# Patient Record
Sex: FEMALE | Race: WHITE | NOT HISPANIC OR LATINO | Employment: OTHER | URBAN - METROPOLITAN AREA
[De-identification: names, ages, dates, MRNs, and addresses within clinical notes are randomized per-mention and may not be internally consistent; named-entity substitution may affect disease eponyms.]

---

## 2022-01-27 ENCOUNTER — OFFICE VISIT (OUTPATIENT)
Dept: INTERNAL MEDICINE | Facility: CLINIC | Age: 77
End: 2022-01-27

## 2022-01-27 VITALS
SYSTOLIC BLOOD PRESSURE: 122 MMHG | RESPIRATION RATE: 20 BRPM | BODY MASS INDEX: 25.43 KG/M2 | HEART RATE: 60 BPM | TEMPERATURE: 97.1 F | HEIGHT: 62 IN | DIASTOLIC BLOOD PRESSURE: 74 MMHG | WEIGHT: 138.2 LBS | OXYGEN SATURATION: 97 %

## 2022-01-27 DIAGNOSIS — J44.9 CHRONIC OBSTRUCTIVE PULMONARY DISEASE, UNSPECIFIED COPD TYPE: ICD-10-CM

## 2022-01-27 DIAGNOSIS — N28.1 KIDNEY CYSTS: ICD-10-CM

## 2022-01-27 DIAGNOSIS — E78.5 HYPERLIPIDEMIA, UNSPECIFIED HYPERLIPIDEMIA TYPE: ICD-10-CM

## 2022-01-27 DIAGNOSIS — R31.9 HEMATURIA, UNSPECIFIED TYPE: Primary | ICD-10-CM

## 2022-01-27 DIAGNOSIS — M19.90 ARTHRITIS: ICD-10-CM

## 2022-01-27 DIAGNOSIS — M81.0 OSTEOPOROSIS WITHOUT CURRENT PATHOLOGICAL FRACTURE, UNSPECIFIED OSTEOPOROSIS TYPE: ICD-10-CM

## 2022-01-27 DIAGNOSIS — Z12.31 ENCOUNTER FOR SCREENING MAMMOGRAM FOR BREAST CANCER: ICD-10-CM

## 2022-01-27 PROCEDURE — 99204 OFFICE O/P NEW MOD 45 MIN: CPT | Performed by: PHYSICIAN ASSISTANT

## 2022-01-27 RX ORDER — ALENDRONATE SODIUM 70 MG/1
TABLET ORAL
COMMUNITY
End: 2022-01-27 | Stop reason: SDUPTHER

## 2022-01-27 RX ORDER — ALENDRONATE SODIUM 70 MG/1
70 TABLET ORAL
Qty: 12 TABLET | Refills: 1 | Status: SHIPPED | OUTPATIENT
Start: 2022-01-27 | End: 2022-07-27 | Stop reason: SDUPTHER

## 2022-01-27 RX ORDER — CELECOXIB 200 MG/1
200 CAPSULE ORAL DAILY
Qty: 90 CAPSULE | Refills: 1 | Status: SHIPPED | OUTPATIENT
Start: 2022-01-27 | End: 2022-07-27 | Stop reason: SDUPTHER

## 2022-01-27 RX ORDER — CELECOXIB 200 MG/1
CAPSULE ORAL
COMMUNITY
End: 2022-01-27 | Stop reason: SDUPTHER

## 2022-01-27 RX ORDER — ALBUTEROL SULFATE 90 UG/1
2 AEROSOL, METERED RESPIRATORY (INHALATION) EVERY 4 HOURS PRN
Qty: 18 G | Refills: 1 | Status: SHIPPED | OUTPATIENT
Start: 2022-01-27 | End: 2022-07-27 | Stop reason: SDUPTHER

## 2022-01-27 RX ORDER — SIMVASTATIN 40 MG
40 TABLET ORAL NIGHTLY
Qty: 90 TABLET | Refills: 1 | Status: SHIPPED | OUTPATIENT
Start: 2022-01-27 | End: 2022-07-27 | Stop reason: SDUPTHER

## 2022-01-27 RX ORDER — SIMVASTATIN 40 MG
TABLET ORAL
COMMUNITY
End: 2022-01-27 | Stop reason: SDUPTHER

## 2022-01-27 NOTE — PROGRESS NOTES
Chief Complaint  Blood in Urine and Med Refill (pt needs 90 day supply. Pt given sample of inhaler )    Subjective          History of Present Illness  Jackie Cordova presents to [facility] for   HLD:  Well controlled on Simvastatin 40mg, had normal cholesterol on labs in 2021.    Kidney Cyst/Hematuria:  Had microscopic blood in urine twice in the last few months at previous Dr office, so they ordered CT scan. CT scan showed kidney cyst 1cm. She does notice any blood.    Osteoporosis:  Last dexa scan a few years ago. Takes Fosamax weekly.     Mammogram:  Last mammogram was last year. No fhx of breast CA. No breast pain, lumps, nipple discharge. Is due for mammogram.     COPD:  On Breo, takes infrequently due to cost. She does not have albuterol inhaler. Does not have wheeze or SOA very often, just when she gets sick with bronchitis.           Review of Systems   Constitutional: Negative for fever and unexpected weight loss.   Respiratory: Negative for cough, shortness of breath and wheezing.    Cardiovascular: Negative for chest pain and palpitations.       The following portions of the patient's history were reviewed and updated as appropriate: allergies, current medications, past family history, past medical history, past social history, past surgical history and problem list.  No Known Allergies  Current Outpatient Medications on File Prior to Visit   Medication Sig Dispense Refill   • diphenhydrAMINE-APAP, sleep, (TYLENOL PM EXTRA STRENGTH PO) Take  by mouth.       No current facility-administered medications on file prior to visit.       Social History     Tobacco Use   Smoking Status Former Smoker   • Packs/day: 0.50   • Types: Cigarettes   • Quit date:    • Years since quittin.0   Smokeless Tobacco Never Used        Objective   Vital Signs:   Vitals:    22 1436   BP: 122/74   Pulse: 60   Resp: 20   Temp: 97.1 °F (36.2 °C)   TempSrc: Temporal   SpO2: 97%   Weight:  "62.7 kg (138 lb 3.2 oz)   Height: 158.1 cm (62.25\")   PainSc:   8   PainLoc: Knee      Physical Exam  Vitals reviewed.   Constitutional:       General: She is not in acute distress.     Appearance: Normal appearance.   HENT:      Head: Normocephalic and atraumatic.   Eyes:      General: No scleral icterus.     Extraocular Movements: Extraocular movements intact.      Conjunctiva/sclera: Conjunctivae normal.   Cardiovascular:      Rate and Rhythm: Normal rate and regular rhythm.      Heart sounds: Normal heart sounds. No murmur heard.      Pulmonary:      Effort: Pulmonary effort is normal. No respiratory distress.      Breath sounds: Normal breath sounds. No stridor. No wheezing or rhonchi.   Musculoskeletal:      Cervical back: Normal range of motion and neck supple.   Skin:     General: Skin is warm and dry.      Coloration: Skin is not jaundiced.   Neurological:      General: No focal deficit present.      Mental Status: She is alert and oriented to person, place, and time.      Gait: Gait normal.   Psychiatric:         Mood and Affect: Mood normal.         Behavior: Behavior normal.        No LMP recorded.    Result Review :                New Medications Ordered This Visit   Medications   • simvastatin (ZOCOR) 40 MG tablet     Sig: Take 1 tablet by mouth Every Night.     Dispense:  90 tablet     Refill:  1   • celecoxib (CeleBREX) 200 MG capsule     Sig: Take 1 capsule by mouth Daily.     Dispense:  90 capsule     Refill:  1   • alendronate (FOSAMAX) 70 MG tablet     Sig: Take 1 tablet by mouth Every 7 (Seven) Days.     Dispense:  12 tablet     Refill:  1   • Fluticasone Furoate-Vilanterol (Breo Ellipta) 100-25 MCG/INH inhaler     Sig: Inhale 1 puff Daily.     Dispense:  180 each     Refill:  1     Dispense 90 d supply   • albuterol sulfate  (90 Base) MCG/ACT inhaler     Sig: Inhale 2 puffs Every 4 (Four) Hours As Needed for Wheezing.     Dispense:  18 g     Refill:  1          Assessment and Plan  "   Diagnoses and all orders for this visit:    1. Hematuria, unspecified type (Primary)  Assessment & Plan:  Refer to urology, pt unable to void today, will return a sample    Orders:  -     Ambulatory Referral to Urology  -     Cancel: POC Urinalysis Dipstick, Automated  -     Urinalysis With Microscopic - Urine, Clean Catch; Future    2. Kidney cysts  Assessment & Plan:  Refer to urology    Orders:  -     Ambulatory Referral to Urology    3. Chronic obstructive pulmonary disease, unspecified COPD type (HCC)  Assessment & Plan:  COPD is improving with treatment.  Continue current medications.  Cont Breo and will refill albuterol      Orders:  -     Fluticasone Furoate-Vilanterol (Breo Ellipta) 100-25 MCG/INH inhaler; Inhale 1 puff Daily.  Dispense: 180 each; Refill: 1  -     albuterol sulfate  (90 Base) MCG/ACT inhaler; Inhale 2 puffs Every 4 (Four) Hours As Needed for Wheezing.  Dispense: 18 g; Refill: 1    4. Osteoporosis without current pathological fracture, unspecified osteoporosis type  Assessment & Plan:  Cont Fosamax    Orders:  -     alendronate (FOSAMAX) 70 MG tablet; Take 1 tablet by mouth Every 7 (Seven) Days.  Dispense: 12 tablet; Refill: 1    5. Hyperlipidemia, unspecified hyperlipidemia type  Assessment & Plan:  Lipid abnormalities are improving with treatment.  Nutritional counseling was provided. and Pharmacotherapy as ordered.  Lipids will be reassessed in 6 months.    Orders:  -     simvastatin (ZOCOR) 40 MG tablet; Take 1 tablet by mouth Every Night.  Dispense: 90 tablet; Refill: 1    6. Arthritis  Assessment & Plan:  Cont Celebrex.     Orders:  -     celecoxib (CeleBREX) 200 MG capsule; Take 1 capsule by mouth Daily.  Dispense: 90 capsule; Refill: 1    7. Encounter for screening mammogram for breast cancer  -     Mammo Screening Digital Tomosynthesis Bilateral With CAD; Future      Follow Up   Return in about 6 months (around 7/27/2022).    Follow up if symptoms worsen or persist or has  new or concerning symptoms, go to ER for severe symptoms.   Reviewed common medication effects and side effects and advised to report side effects immediately.  Encouraged medication compliance and the importance of keeping scheduled follow up appointments with me and any other providers.  If a referral was made please contact our office if you have not heard about an appointment in the next 2 weeks.   If labs or images are ordered we will contact you with the results within the next week.  If you have not heard from us after a week please call our office to inquire about the results.   Patient was given instructions and counseling regarding her condition or for health maintenance advice. Please see specific information pulled into the AVS if appropriate.     Whitley Treadwell PA-C    * Please note that portions of this note were completed with a voice recognition program.

## 2022-01-28 NOTE — ASSESSMENT & PLAN NOTE
COPD is improving with treatment.  Continue current medications.  Cont Breo and will refill albuterol

## 2022-02-21 ENCOUNTER — LAB (OUTPATIENT)
Dept: LAB | Facility: HOSPITAL | Age: 77
End: 2022-02-21

## 2022-02-21 DIAGNOSIS — R31.9 HEMATURIA, UNSPECIFIED TYPE: ICD-10-CM

## 2022-02-21 LAB
BILIRUB UR QL STRIP: NEGATIVE
CLARITY UR: CLEAR
COLOR UR: YELLOW
GLUCOSE UR STRIP-MCNC: NEGATIVE MG/DL
HGB UR QL STRIP.AUTO: NEGATIVE
KETONES UR QL STRIP: NEGATIVE
LEUKOCYTE ESTERASE UR QL STRIP.AUTO: NEGATIVE
NITRITE UR QL STRIP: NEGATIVE
PH UR STRIP.AUTO: 5.5 [PH] (ref 5–8)
PROT UR QL STRIP: NEGATIVE
SP GR UR STRIP: 1.01 (ref 1–1.03)
UROBILINOGEN UR QL STRIP: NORMAL

## 2022-02-21 PROCEDURE — 81001 URINALYSIS AUTO W/SCOPE: CPT | Performed by: PHYSICIAN ASSISTANT

## 2022-02-22 ENCOUNTER — TELEPHONE (OUTPATIENT)
Dept: INTERNAL MEDICINE | Facility: CLINIC | Age: 77
End: 2022-02-22

## 2022-02-22 LAB
BACTERIA UR QL AUTO: NORMAL /HPF
HYALINE CASTS UR QL AUTO: NORMAL /LPF
RBC # UR STRIP: NORMAL /HPF
REF LAB TEST METHOD: NORMAL
SQUAMOUS #/AREA URNS HPF: NORMAL /HPF
WBC # UR STRIP: NORMAL /HPF

## 2022-02-22 NOTE — TELEPHONE ENCOUNTER
----- Message from Whitley Treadwell PA-C sent at 2/22/2022 10:25 AM EST -----  Urinalysis was normal, no blood noted.

## 2022-02-23 NOTE — TELEPHONE ENCOUNTER
Attempted to reach pt. LVM for pt to return call with office.     HUB PLEASE READ THE FOLLOWING:     Urinalysis was normal, no blood noted.    Thank you.     Ann

## 2022-02-25 NOTE — TELEPHONE ENCOUNTER
Lvm for patient to return call regarding results, office number given.     HUB TO READ: IF PATIENT RETURNS CALL PLEASE INFORM HER OF PROVIDERS MESSAGE:   Urinalysis was normal, no blood noted.

## 2022-02-28 ENCOUNTER — OFFICE VISIT (OUTPATIENT)
Dept: UROLOGY | Facility: CLINIC | Age: 77
End: 2022-02-28

## 2022-02-28 VITALS
HEART RATE: 71 BPM | OXYGEN SATURATION: 97 % | SYSTOLIC BLOOD PRESSURE: 134 MMHG | HEIGHT: 62 IN | BODY MASS INDEX: 26.2 KG/M2 | WEIGHT: 142.4 LBS | DIASTOLIC BLOOD PRESSURE: 78 MMHG

## 2022-02-28 DIAGNOSIS — R31.9 HEMATURIA, UNSPECIFIED TYPE: ICD-10-CM

## 2022-02-28 DIAGNOSIS — N28.1 KIDNEY CYSTS: ICD-10-CM

## 2022-02-28 LAB
BILIRUB BLD-MCNC: NEGATIVE MG/DL
CLARITY, POC: CLEAR
COLOR UR: YELLOW
EXPIRATION DATE: ABNORMAL
GLUCOSE UR STRIP-MCNC: NEGATIVE MG/DL
KETONES UR QL: NEGATIVE
LEUKOCYTE EST, POC: NEGATIVE
Lab: ABNORMAL
NITRITE UR-MCNC: NEGATIVE MG/ML
PH UR: 5.5 [PH] (ref 5–8)
PROT UR STRIP-MCNC: NEGATIVE MG/DL
RBC # UR STRIP: ABNORMAL /UL
SP GR UR: 1.02 (ref 1–1.03)
UROBILINOGEN UR QL: NORMAL

## 2022-02-28 PROCEDURE — 99204 OFFICE O/P NEW MOD 45 MIN: CPT | Performed by: STUDENT IN AN ORGANIZED HEALTH CARE EDUCATION/TRAINING PROGRAM

## 2022-02-28 PROCEDURE — 81003 URINALYSIS AUTO W/O SCOPE: CPT | Performed by: STUDENT IN AN ORGANIZED HEALTH CARE EDUCATION/TRAINING PROGRAM

## 2022-02-28 PROCEDURE — 51798 US URINE CAPACITY MEASURE: CPT | Performed by: STUDENT IN AN ORGANIZED HEALTH CARE EDUCATION/TRAINING PROGRAM

## 2022-02-28 NOTE — PROGRESS NOTES
Microscopic Hematuria Female Office Visit      Patient Name: Jackie Cordova  : 1945   MRN: [id number]     Chief Complaint:  Microscopic Hematuria.     Chief Complaint   Patient presents with   • New Patient   • Blood in Urine   .     Referring Provider: Whitley Treadwell PA-C    History of Present Illness: Ms. Cordova is a 76 y.o. female with history of microscopic hematuria. She presents today for evaluation.  Patient reports she is from [place] originally, moved here number of years ago, in the early  she reported she followed with urologist for some procedure for either urethral or ureteral dilation, she is not sure.  Unclear why she was following with the urologist and she does not remember.  She denies any gross hematuria.  Previous records from [facility] in 2021 demonstrates positive microscopic hematuria on urinalysis and creatinine 0.6 with a corresponding GFR of 90+.  She has no other urinary complaints today.  She underwent a CT urogram for evaluation, she brought her disc in today which will be evaluated, the report demonstrates 1 cm left renal cyst and no other abnormalities, normal collecting systems and no concern for other lesions.    Smoking History: 1/2 pack per day x 40-50 years    Occupational exposures: Retired from     Family History of  malignancy: None    Family History of breast, colon, ovarian malignancy: None      Subjective      Review of System: Review of Systems   Constitutional: Negative for chills, fatigue, fever and unexpected weight change.   HENT: Negative for sore throat.    Eyes: Negative for visual disturbance.   Respiratory: Negative for cough, chest tightness and shortness of breath.    Cardiovascular: Negative for chest pain and leg swelling.   Gastrointestinal: Negative for blood in stool, constipation, diarrhea, nausea, rectal pain and vomiting.   Genitourinary: Positive for flank pain, frequency and urgency. Negative for  decreased urine volume, difficulty urinating, dysuria, enuresis, genital sores and hematuria.   Musculoskeletal: Negative for back pain and joint swelling.   Skin: Negative for rash and wound.   Neurological: Negative for seizures, speech difficulty, weakness and headaches.   Psychiatric/Behavioral: Negative for confusion, sleep disturbance and suicidal ideas. The patient is not nervous/anxious.       I have reviewed the ROS documented by my clinical staff, I have updated appropriately and I agree. Christian Hudson MD    Past Medical History:  Past Medical History:   Diagnosis Date   • Arthritis    • Hyperlipidemia        Past Surgical History:  Past Surgical History:   Procedure Laterality Date   • HIP SURGERY     • REPLACEMENT TOTAL KNEE         Medications:    Current Outpatient Medications:   •  albuterol sulfate  (90 Base) MCG/ACT inhaler, Inhale 2 puffs Every 4 (Four) Hours As Needed for Wheezing., Disp: 18 g, Rfl: 1  •  alendronate (FOSAMAX) 70 MG tablet, Take 1 tablet by mouth Every 7 (Seven) Days., Disp: 12 tablet, Rfl: 1  •  celecoxib (CeleBREX) 200 MG capsule, Take 1 capsule by mouth Daily., Disp: 90 capsule, Rfl: 1  •  diphenhydrAMINE-APAP, sleep, (TYLENOL PM EXTRA STRENGTH PO), Take  by mouth., Disp: , Rfl:   •  Fluticasone Furoate-Vilanterol (Breo Ellipta) 100-25 MCG/INH inhaler, Inhale 1 puff Daily., Disp: 180 each, Rfl: 1  •  simvastatin (ZOCOR) 40 MG tablet, Take 1 tablet by mouth Every Night., Disp: 90 tablet, Rfl: 1    Allergies:  No Known Allergies    Social History:  Social History     Socioeconomic History   • Marital status:    Tobacco Use   • Smoking status: Former Smoker     Packs/day: 0.50     Types: Cigarettes     Quit date:      Years since quittin.1   • Smokeless tobacco: Never Used   Vaping Use   • Vaping Use: Never used   Substance and Sexual Activity   • Alcohol use: Never   • Drug use: Never   • Sexual activity: Not Currently       Family History:  Family  "History   Problem Relation Age of Onset   • Diabetes Brother    • Arthritis Brother    • Arthritis Mother    • Arthritis Sister             Post void residual bladder scan:   30 mL    Objective     Physical Exam:   Vital Signs:   Vitals:    02/28/22 1335   BP: 134/78   Pulse: 71   SpO2: 97%   Weight: 64.6 kg (142 lb 6.4 oz)   Height: 158.1 cm (62.25\")     Body mass index is 25.84 kg/m².     Physical Exam  Vitals and nursing note reviewed. Exam conducted with a chaperone present.   Constitutional:       Appearance: Normal appearance.   HENT:      Head: Normocephalic and atraumatic.      Mouth/Throat:      Mouth: Mucous membranes are moist.      Pharynx: Oropharynx is clear.   Eyes:      Extraocular Movements: Extraocular movements intact.      Conjunctiva/sclera: Conjunctivae normal.   Cardiovascular:      Rate and Rhythm: Normal rate and regular rhythm.   Pulmonary:      Effort: Pulmonary effort is normal. No respiratory distress.   Abdominal:      Palpations: Abdomen is soft.      Tenderness: There is no abdominal tenderness. There is no right CVA tenderness or left CVA tenderness.   Genitourinary:     Comments:    Musculoskeletal:         General: Normal range of motion.      Cervical back: Normal range of motion.   Skin:     General: Skin is warm and dry.   Neurological:      General: No focal deficit present.      Mental Status: She is alert and oriented to person, place, and time.   Psychiatric:         Mood and Affect: Mood normal.         Behavior: Behavior normal.         Labs:   Brief Urine Lab Results  (Last result in the past 365 days)      Color   Clarity   Blood   Leuk Est   Nitrite   Protein   CREAT   Urine HCG        02/28/22 1401 Yellow   Clear   1+   Negative   Negative   Negative                      No results found for: GLUCOSE, CALCIUM, NA, K, CO2, CL, BUN, CREATININE, EGFRIFAFRI, EGFRIFNONA, BCR, ANIONGAP    No results found for: WBC, HGB, HCT, MCV, PLT    Images:   No Images in the past 120 " days found..    Measures:   Tobacco:   Jackie Cordova  reports that she quit smoking about 13 months ago. Her smoking use included cigarettes. She smoked 0.50 packs per day. She has never used smokeless tobacco.             Urine Incontinence: ( NOUI)  Patient reports that she is not currently experiencing any symptoms of urinary incontinence.      Assessment / Plan      Assessment/Plan:   Ms. Jackie Cordova is a 76 y.o. female who presents today for asymptomatic microscopic hematuria       The patient was counseled regarding the possible etiologies, relevant work-up and diagnostic approach for microscopic hematuria, as well as the relevant risk categories as assigned by the American Urological Association guidelines.  I discussed that the definition of microscopic hematuria includes a microscopic urinalysis (not dipstick UA) positive for greater than 3 RBCs per high-powered field under microscopy.  We also discussed that any history of gross hematuria places a patient at higher risk for occult malignancies and necessitates prompt workup.  We discussed the aforementioned risk categories as assigned by the AUA, which are depicted below.  Ultimately, work-up and diagnosis of microscopic hematuria is driven by risk category.  Given the patient's age, long term smoking history, and microhematuria; the patient is deemed HIGH risk, and for these reasons I recommend proceeding with a flexible diagnostic cystoscopy.    She has completed a CT urogram which is negative for concerns.    If the patient's microscopic hematuria work-up is negative, per AUA guidelines I would recommend a repeat urinalysis via the patient's primary care provider in 12 months.  If the repeat urinalysis is positive, the patient and I will then need to have a shared decision-making conversation regarding further work-up versus observation, given the low likelihood of identifying etiology in this situation.  If patient were to develop gross hematuria in the  interim, the patient would need a total re-evaluation.                 Diagnoses and all orders for this visit:    1.  Microscopic hematuria  -Proceed with flexible cystoscopy to complete her evaluation given her smoking history, CT urogram is negative    -     POC Urinalysis Dipstick, Automated    2. Kidney cysts  -Benign simple cyst, nothing to do  -     POC Urinalysis Dipstick, Automated         Follow Up:   Return in about 22 days (around 3/22/2022) for flex cysto on 3/22/22 .    I spent approximately 20 minutes providing clinical care for this patient; including review of patient's chart and provider documentation, face to face time spent with patient in examination room (obtaining history, performing physical exam, discussing diagnosis and management options), placing orders, and completing patient documentation.     Christian Hudson MD  [facility]

## 2022-03-22 ENCOUNTER — OFFICE VISIT (OUTPATIENT)
Dept: UROLOGY | Facility: CLINIC | Age: 77
End: 2022-03-22

## 2022-03-22 VITALS
HEIGHT: 62 IN | OXYGEN SATURATION: 99 % | SYSTOLIC BLOOD PRESSURE: 126 MMHG | DIASTOLIC BLOOD PRESSURE: 78 MMHG | BODY MASS INDEX: 26.13 KG/M2 | WEIGHT: 142 LBS | HEART RATE: 77 BPM

## 2022-03-22 DIAGNOSIS — R31.29 MICROHEMATURIA: Primary | ICD-10-CM

## 2022-03-22 LAB
BILIRUB BLD-MCNC: NEGATIVE MG/DL
CLARITY, POC: CLEAR
COLOR UR: YELLOW
EXPIRATION DATE: ABNORMAL
GLUCOSE UR STRIP-MCNC: NEGATIVE MG/DL
KETONES UR QL: NEGATIVE
LEUKOCYTE EST, POC: NEGATIVE
Lab: ABNORMAL
NITRITE UR-MCNC: NEGATIVE MG/ML
PH UR: 6 [PH] (ref 5–8)
PROT UR STRIP-MCNC: NEGATIVE MG/DL
RBC # UR STRIP: ABNORMAL /UL
SP GR UR: 1.01 (ref 1–1.03)
UROBILINOGEN UR QL: NORMAL

## 2022-03-22 PROCEDURE — 52000 CYSTOURETHROSCOPY: CPT | Performed by: STUDENT IN AN ORGANIZED HEALTH CARE EDUCATION/TRAINING PROGRAM

## 2022-03-22 PROCEDURE — 81003 URINALYSIS AUTO W/O SCOPE: CPT | Performed by: STUDENT IN AN ORGANIZED HEALTH CARE EDUCATION/TRAINING PROGRAM

## 2022-03-22 NOTE — PROGRESS NOTES
Preprocedure diagnosis  Microscopic hematuria     Postprocedure diagnosis  Microscopic hematuria    Procedure  Flexible Cystourethroscopy    Attending surgeon  Christian Hudson MD    Anesthesia  2% lidocaine jelly intraurethrally    Complications  None    Indications  76 y.o. female undergoing a flexible cystoscopy for the above mentioned indications.      Informed consent was obtained prior to the procedure start.       Findings  Cystoscopy revealed normal bladder mucosa with NO tumors, masses, stones or trabeculations noted.     Procedure  The patient was placed in supine position and prepped and draped in sterile fashion with lidocaine jelly instilled 5 minutes pre-procedure start.  A brief timeout including available nursing staff and awake patient was performed.  The 16 Fr digital flexible cystoscope was lubricated and gently placed into the urethral meatus. The proximal and distal portions of the urethra appeared well vascularized and normal in appearance. The bladder neck was visualized and appeared well vascularized without mucosal lesions or abnormal appearance. The bladder was then entered and  completely visualized including the trigone. There were bilateral orthotopic ureteral orifices which appeared patent and effluxed clear yellow urine. The posterior wall, lateral walls, anterior wall, and dome were visualized. The cystoscope was then retroflexed and the bladder neck was further visualized and appeared normal.  The scope was gently withdrawn and the procedure terminated.  The patient tolerated the procedure well.       Follow Up:     Flexible cystoscopy and CT urogram negative, recommend yearly UA, BMP and reflex micro UA if hematuria present.   - F/u PRN    Christian Hudson MD

## 2022-04-04 ENCOUNTER — APPOINTMENT (OUTPATIENT)
Dept: MAMMOGRAPHY | Facility: HOSPITAL | Age: 77
End: 2022-04-04

## 2022-07-27 ENCOUNTER — OFFICE VISIT (OUTPATIENT)
Dept: INTERNAL MEDICINE | Facility: CLINIC | Age: 77
End: 2022-07-27

## 2022-07-27 ENCOUNTER — LAB (OUTPATIENT)
Dept: LAB | Facility: HOSPITAL | Age: 77
End: 2022-07-27

## 2022-07-27 VITALS
HEIGHT: 60 IN | OXYGEN SATURATION: 99 % | TEMPERATURE: 98 F | RESPIRATION RATE: 16 BRPM | WEIGHT: 148.2 LBS | SYSTOLIC BLOOD PRESSURE: 116 MMHG | BODY MASS INDEX: 29.09 KG/M2 | HEART RATE: 68 BPM | DIASTOLIC BLOOD PRESSURE: 70 MMHG

## 2022-07-27 DIAGNOSIS — Z11.59 NEED FOR HEPATITIS C SCREENING TEST: ICD-10-CM

## 2022-07-27 DIAGNOSIS — Z00.00 MEDICARE ANNUAL WELLNESS VISIT, SUBSEQUENT: ICD-10-CM

## 2022-07-27 DIAGNOSIS — R31.29 MICROSCOPIC HEMATURIA: ICD-10-CM

## 2022-07-27 DIAGNOSIS — Z87.891 FORMER SMOKER: ICD-10-CM

## 2022-07-27 DIAGNOSIS — J44.9 CHRONIC OBSTRUCTIVE PULMONARY DISEASE, UNSPECIFIED COPD TYPE: ICD-10-CM

## 2022-07-27 DIAGNOSIS — M81.0 OSTEOPOROSIS WITHOUT CURRENT PATHOLOGICAL FRACTURE, UNSPECIFIED OSTEOPOROSIS TYPE: ICD-10-CM

## 2022-07-27 DIAGNOSIS — E78.5 HYPERLIPIDEMIA, UNSPECIFIED HYPERLIPIDEMIA TYPE: ICD-10-CM

## 2022-07-27 DIAGNOSIS — Z13.29 SCREENING FOR ENDOCRINE DISORDER: ICD-10-CM

## 2022-07-27 DIAGNOSIS — Z00.00 MEDICARE ANNUAL WELLNESS VISIT, SUBSEQUENT: Primary | ICD-10-CM

## 2022-07-27 DIAGNOSIS — M19.90 ARTHRITIS: ICD-10-CM

## 2022-07-27 DIAGNOSIS — Z23 NEED FOR VACCINATION: ICD-10-CM

## 2022-07-27 PROBLEM — K21.9 GASTROESOPHAGEAL REFLUX DISEASE WITHOUT ESOPHAGITIS: Status: ACTIVE | Noted: 2022-01-04

## 2022-07-27 PROBLEM — E83.52 HYPERCALCEMIA: Status: ACTIVE | Noted: 2022-01-04

## 2022-07-27 LAB
ALBUMIN SERPL-MCNC: 4.5 G/DL (ref 3.5–5.2)
ALBUMIN/GLOB SERPL: 2 G/DL
ALP SERPL-CCNC: 91 U/L (ref 39–117)
ALT SERPL W P-5'-P-CCNC: 26 U/L (ref 1–33)
ANION GAP SERPL CALCULATED.3IONS-SCNC: 11.2 MMOL/L (ref 5–15)
AST SERPL-CCNC: 27 U/L (ref 1–32)
BASOPHILS # BLD AUTO: 0.05 10*3/MM3 (ref 0–0.2)
BASOPHILS NFR BLD AUTO: 0.7 % (ref 0–1.5)
BILIRUB SERPL-MCNC: 0.4 MG/DL (ref 0–1.2)
BUN SERPL-MCNC: 20 MG/DL (ref 8–23)
BUN/CREAT SERPL: 23.8 (ref 7–25)
CALCIUM SPEC-SCNC: 10 MG/DL (ref 8.6–10.5)
CHLORIDE SERPL-SCNC: 103 MMOL/L (ref 98–107)
CHOLEST SERPL-MCNC: 167 MG/DL (ref 0–200)
CO2 SERPL-SCNC: 23.8 MMOL/L (ref 22–29)
CREAT SERPL-MCNC: 0.84 MG/DL (ref 0.57–1)
DEPRECATED RDW RBC AUTO: 43.3 FL (ref 37–54)
EGFRCR SERPLBLD CKD-EPI 2021: 72.1 ML/MIN/1.73
EOSINOPHIL # BLD AUTO: 0.34 10*3/MM3 (ref 0–0.4)
EOSINOPHIL NFR BLD AUTO: 4.8 % (ref 0.3–6.2)
ERYTHROCYTE [DISTWIDTH] IN BLOOD BY AUTOMATED COUNT: 13.1 % (ref 12.3–15.4)
GLOBULIN UR ELPH-MCNC: 2.3 GM/DL
GLUCOSE SERPL-MCNC: 80 MG/DL (ref 65–99)
HCT VFR BLD AUTO: 37.1 % (ref 34–46.6)
HCV AB SER DONR QL: NORMAL
HDLC SERPL-MCNC: 73 MG/DL (ref 40–60)
HGB BLD-MCNC: 12.4 G/DL (ref 12–15.9)
IMM GRANULOCYTES # BLD AUTO: 0.01 10*3/MM3 (ref 0–0.05)
IMM GRANULOCYTES NFR BLD AUTO: 0.1 % (ref 0–0.5)
LDLC SERPL CALC-MCNC: 79 MG/DL (ref 0–100)
LDLC/HDLC SERPL: 1.06 {RATIO}
LYMPHOCYTES # BLD AUTO: 2.25 10*3/MM3 (ref 0.7–3.1)
LYMPHOCYTES NFR BLD AUTO: 32 % (ref 19.6–45.3)
MCH RBC QN AUTO: 30.4 PG (ref 26.6–33)
MCHC RBC AUTO-ENTMCNC: 33.4 G/DL (ref 31.5–35.7)
MCV RBC AUTO: 90.9 FL (ref 79–97)
MONOCYTES # BLD AUTO: 0.62 10*3/MM3 (ref 0.1–0.9)
MONOCYTES NFR BLD AUTO: 8.8 % (ref 5–12)
NEUTROPHILS NFR BLD AUTO: 3.76 10*3/MM3 (ref 1.7–7)
NEUTROPHILS NFR BLD AUTO: 53.6 % (ref 42.7–76)
NRBC BLD AUTO-RTO: 0 /100 WBC (ref 0–0.2)
PLATELET # BLD AUTO: 379 10*3/MM3 (ref 140–450)
PMV BLD AUTO: 10.4 FL (ref 6–12)
POTASSIUM SERPL-SCNC: 4.8 MMOL/L (ref 3.5–5.2)
PROT SERPL-MCNC: 6.8 G/DL (ref 6–8.5)
RBC # BLD AUTO: 4.08 10*6/MM3 (ref 3.77–5.28)
SODIUM SERPL-SCNC: 138 MMOL/L (ref 136–145)
TRIGL SERPL-MCNC: 82 MG/DL (ref 0–150)
TSH SERPL DL<=0.05 MIU/L-ACNC: 1.27 UIU/ML (ref 0.27–4.2)
VLDLC SERPL-MCNC: 15 MG/DL (ref 5–40)
WBC NRBC COR # BLD: 7.03 10*3/MM3 (ref 3.4–10.8)

## 2022-07-27 PROCEDURE — G0009 ADMIN PNEUMOCOCCAL VACCINE: HCPCS | Performed by: PHYSICIAN ASSISTANT

## 2022-07-27 PROCEDURE — G0439 PPPS, SUBSEQ VISIT: HCPCS | Performed by: PHYSICIAN ASSISTANT

## 2022-07-27 PROCEDURE — 99397 PER PM REEVAL EST PAT 65+ YR: CPT | Performed by: PHYSICIAN ASSISTANT

## 2022-07-27 PROCEDURE — 90677 PCV20 VACCINE IM: CPT | Performed by: PHYSICIAN ASSISTANT

## 2022-07-27 PROCEDURE — 1125F AMNT PAIN NOTED PAIN PRSNT: CPT | Performed by: PHYSICIAN ASSISTANT

## 2022-07-27 PROCEDURE — 80061 LIPID PANEL: CPT | Performed by: PHYSICIAN ASSISTANT

## 2022-07-27 PROCEDURE — 84443 ASSAY THYROID STIM HORMONE: CPT | Performed by: PHYSICIAN ASSISTANT

## 2022-07-27 PROCEDURE — 86803 HEPATITIS C AB TEST: CPT | Performed by: PHYSICIAN ASSISTANT

## 2022-07-27 PROCEDURE — 1170F FXNL STATUS ASSESSED: CPT | Performed by: PHYSICIAN ASSISTANT

## 2022-07-27 PROCEDURE — 1160F RVW MEDS BY RX/DR IN RCRD: CPT | Performed by: PHYSICIAN ASSISTANT

## 2022-07-27 PROCEDURE — 85025 COMPLETE CBC W/AUTO DIFF WBC: CPT | Performed by: PHYSICIAN ASSISTANT

## 2022-07-27 PROCEDURE — 80053 COMPREHEN METABOLIC PANEL: CPT | Performed by: PHYSICIAN ASSISTANT

## 2022-07-27 RX ORDER — CELECOXIB 200 MG/1
200 CAPSULE ORAL DAILY
Qty: 90 CAPSULE | Refills: 1 | Status: SHIPPED | OUTPATIENT
Start: 2022-07-27 | End: 2023-02-22

## 2022-07-27 RX ORDER — ALENDRONATE SODIUM 70 MG/1
70 TABLET ORAL
Qty: 12 TABLET | Refills: 1 | Status: ON HOLD | OUTPATIENT
Start: 2022-07-27 | End: 2023-03-21

## 2022-07-27 RX ORDER — SIMVASTATIN 40 MG
40 TABLET ORAL NIGHTLY
Qty: 90 TABLET | Refills: 1 | Status: SHIPPED | OUTPATIENT
Start: 2022-07-27 | End: 2023-02-22

## 2022-07-27 RX ORDER — ALBUTEROL SULFATE 90 UG/1
2 AEROSOL, METERED RESPIRATORY (INHALATION) EVERY 4 HOURS PRN
Qty: 18 G | Refills: 1 | Status: SHIPPED | OUTPATIENT
Start: 2022-07-27 | End: 2023-03-31

## 2022-07-27 NOTE — ASSESSMENT & PLAN NOTE
Lipid abnormalities are improving with treatment.  Nutritional counseling was provided. and Pharmacotherapy as ordered.  Lipids will be reassessed in 6 months. Cont Zocor

## 2022-07-27 NOTE — ASSESSMENT & PLAN NOTE
COPD is improving with treatment.  Continue current medications. samples of breo x 1 mo given, cont breo and albuterol prn

## 2022-07-27 NOTE — PROGRESS NOTES
The ABCs of the Annual Wellness Visit  Subsequent Medicare Wellness Visit    Chief Complaint   Patient presents with   • Medicare Wellness-subsequent      Subjective    History of Present Illness:  Jackie Cordova is a 77 y.o. female who presents for a Subsequent Medicare Wellness Visit.    HLD:  Well controlled on Simvastatin 40mg, had normal cholesterol on labs in Nov 2021. No AE    Kidney Cyst/Hematuria:  Did see urology, had normal w/u for hematuria, repeat BMP and u/a yearly and f/u prn. Last u/a 3/2022    Osteoporosis:  Last dexa scan a few years ago. Takes Fosamax weekly.  Not taking vit D or calcium.     Mammogram:  Last mammogram was last year. No fhx of breast CA. No breast pain, lumps, nipple discharge. Is due for mammogram, has to reschedule.    COPD:  On Breo, takes infrequently due to cost. She does not have albuterol inhaler. Does not have wheeze or SOA very often, just when she gets sick with bronchitis.         The following portions of the patient's history were reviewed and   updated as appropriate: allergies, current medications, past family history, past medical history, past social history, past surgical history and problem list.    Compared to one year ago, the patient feels her physical   health is the same.    Compared to one year ago, the patient feels her mental   health is the same.    Recent Hospitalizations:  She was not admitted to the hospital during the last year.       Current Medical Providers:  Patient Care Team:  Whitley Treadwell PA-C as PCP - General (Physician Assistant)    Outpatient Medications Prior to Visit   Medication Sig Dispense Refill   • diphenhydrAMINE-APAP, sleep, (TYLENOL PM EXTRA STRENGTH PO) Take  by mouth.     • alendronate (FOSAMAX) 70 MG tablet Take 1 tablet by mouth Every 7 (Seven) Days. 12 tablet 1   • celecoxib (CeleBREX) 200 MG capsule Take 1 capsule by mouth Daily. 90 capsule 1   • Fluticasone Furoate-Vilanterol (Breo Ellipta) 100-25 MCG/INH inhaler Inhale  1 puff Daily. 180 each 1   • simvastatin (ZOCOR) 40 MG tablet Take 1 tablet by mouth Every Night. 90 tablet 1   • albuterol sulfate  (90 Base) MCG/ACT inhaler Inhale 2 puffs Every 4 (Four) Hours As Needed for Wheezing. 18 g 1     No facility-administered medications prior to visit.       No opioid medication identified on active medication list. I have reviewed chart for other potential  high risk medication/s and harmful drug interactions in the elderly.          Aspirin is not on active medication list.  Aspirin use is not indicated based on review of current medical condition/s. Risk of harm outweighs potential benefits.  .    Patient Active Problem List   Diagnosis   • Osteoporosis without current pathological fracture   • Chronic obstructive pulmonary disease (HCC)   • Kidney cysts   • Hyperlipidemia   • Arthritis   • Microscopic hematuria   • Hypercalcemia   • Gastroesophageal reflux disease without esophagitis     Advance Care Planning  Advance Directive is on file.  ACP discussion was held with the patient during this visit. Patient has an advance directive in EMR which is still valid.     Review of Systems   Constitutional: Negative for fatigue and fever.   HENT: Negative for congestion and sore throat.    Respiratory: Negative for cough, shortness of breath and wheezing.    Cardiovascular: Negative for chest pain and palpitations.   Gastrointestinal: Negative for abdominal pain, blood in stool, constipation, diarrhea and vomiting.   Endocrine: Negative for polydipsia and polyuria.   Genitourinary: Negative for dysuria, hematuria and menstrual problem.   Musculoskeletal: Positive for arthralgias. Negative for gait problem and joint swelling.   Skin: Negative for rash.   Neurological: Negative for dizziness, syncope and confusion.   Psychiatric/Behavioral: Negative for sleep disturbance. The patient is not nervous/anxious.         Objective    Vitals:    07/27/22 0813   BP: 116/70   Pulse: 68   Resp:  "16   Temp: 98 °F (36.7 °C)   TempSrc: Infrared   SpO2: 99%   Weight: 67.2 kg (148 lb 3.2 oz)   Height: 152.4 cm (60\")     Estimated body mass index is 28.94 kg/m² as calculated from the following:    Height as of this encounter: 152.4 cm (60\").    Weight as of this encounter: 67.2 kg (148 lb 3.2 oz).    BMI is >= 25 and <30. (Overweight) The following options were offered after discussion;: exercise counseling/recommendations and nutrition counseling/recommendations      Does the patient have evidence of cognitive impairment? No    Physical Exam  Vitals reviewed.   Constitutional:       General: She is not in acute distress.     Appearance: Normal appearance.   HENT:      Head: Normocephalic and atraumatic.   Eyes:      General: No scleral icterus.     Extraocular Movements: Extraocular movements intact.      Conjunctiva/sclera: Conjunctivae normal.   Cardiovascular:      Rate and Rhythm: Normal rate and regular rhythm.      Heart sounds: Normal heart sounds. No murmur heard.  Pulmonary:      Effort: Pulmonary effort is normal. No respiratory distress.      Breath sounds: Normal breath sounds. No stridor. No wheezing or rhonchi.   Abdominal:      General: Bowel sounds are normal.      Palpations: Abdomen is soft. There is no mass.      Tenderness: There is no abdominal tenderness.   Musculoskeletal:      Cervical back: Normal range of motion and neck supple.   Skin:     General: Skin is warm and dry.      Coloration: Skin is not jaundiced.   Neurological:      General: No focal deficit present.      Mental Status: She is alert and oriented to person, place, and time.      Gait: Gait normal.   Psychiatric:         Mood and Affect: Mood normal.         Behavior: Behavior normal.                 HEALTH RISK ASSESSMENT    Smoking Status:  Social History     Tobacco Use   Smoking Status Former Smoker   • Packs/day: 0.50   • Years: 40.00   • Pack years: 20.00   • Types: Cigarettes   • Start date: 1981   • Quit date: 2021 "   • Years since quittin.5   Smokeless Tobacco Never Used     Alcohol Consumption:  Social History     Substance and Sexual Activity   Alcohol Use Yes    Comment: occasionally a glass of wine     Fall Risk Screen:    HAKAN Fall Risk Assessment was completed, and patient is at HIGH risk for falls. Assessment completed on:2022    Depression Screening:  PHQ-2/PHQ-9 Depression Screening 2022   Retired PHQ-9 Total Score -   Retired Total Score -   Little Interest or Pleasure in Doing Things 0-->not at all   Feeling Down, Depressed or Hopeless 0-->not at all   PHQ-9: Brief Depression Severity Measure Score 0       Health Habits and Functional and Cognitive Screening:  Functional & Cognitive Status 2022   Do you have difficulty preparing food and eating? No   Do you have difficulty bathing yourself, getting dressed or grooming yourself? No   Do you have difficulty using the toilet? No   Do you have difficulty moving around from place to place? No   Do you have trouble with steps or getting out of a bed or a chair? No   Current Diet Unhealthy Diet   Dental Exam Not up to date   Eye Exam Up to date   Exercise (times per week) 0 times per week   Current Exercises Include No Regular Exercise   Do you need help using the phone?  No   Are you deaf or do you have serious difficulty hearing?  No   Do you need help with transportation? No   Do you need help shopping? No   Do you need help preparing meals?  No   Do you need help with housework?  No   Do you need help with laundry? No   Do you need help taking your medications? No   Do you need help managing money? No   Do you ever drive or ride in a car without wearing a seat belt? No   Have you felt unusual stress, anger or loneliness in the last month? No   Who do you live with? Alone   If you need help, do you have trouble finding someone available to you? No   Have you been bothered in the last four weeks by sexual problems? No   Do you have difficulty  concentrating, remembering or making decisions? No       Age-appropriate Screening Schedule:  Refer to the list below for future screening recommendations based on patient's age, sex and/or medical conditions. Orders for these recommended tests are listed in the plan section. The patient has been provided with a written plan.    Health Maintenance   Topic Date Due   • DXA SCAN  Never done   • TDAP/TD VACCINES (1 - Tdap) Never done   • ZOSTER VACCINE (1 of 2) Never done   • LIPID PANEL  Never done   • INFLUENZA VACCINE  10/01/2022              Assessment & Plan   CMS Preventative Services Quick Reference  Risk Factors Identified During Encounter  Fall Risk-High or Moderate  Obesity/Overweight   The above risks/problems have been discussed with the patient.  Follow up actions/plans if indicated are seen below in the Assessment/Plan Section.  Pertinent information has been shared with the patient in the After Visit Summary.    Diagnoses and all orders for this visit:    1. Medicare annual wellness visit, subsequent (Primary)  -     Comprehensive Metabolic Panel; Future  -     Lipid Panel; Future  -     CBC Auto Differential; Future  -     TSH Rfx On Abnormal To Free T4; Future    2. Chronic obstructive pulmonary disease, unspecified COPD type (HCC)  Assessment & Plan:  COPD is improving with treatment.  Continue current medications. samples of breo x 1 mo given, cont breo and albuterol prn        Orders:  -     albuterol sulfate  (90 Base) MCG/ACT inhaler; Inhale 2 puffs Every 4 (Four) Hours As Needed for Wheezing.  Dispense: 18 g; Refill: 1  -     Comprehensive Metabolic Panel; Future  -     CBC Auto Differential; Future  -     Fluticasone Furoate-Vilanterol (Breo Ellipta) 100-25 MCG/INH inhaler; Inhale 1 puff Daily.  Dispense: 28 each; Refill: 5    3. Hyperlipidemia, unspecified hyperlipidemia type  Assessment & Plan:  Lipid abnormalities are improving with treatment.  Nutritional counseling was provided. and  Pharmacotherapy as ordered.  Lipids will be reassessed in 6 months. Cont Zocor    Orders:  -     simvastatin (ZOCOR) 40 MG tablet; Take 1 tablet by mouth Every Night.  Dispense: 90 tablet; Refill: 1  -     Lipid Panel; Future    4. Osteoporosis without current pathological fracture, unspecified osteoporosis type  Assessment & Plan:  Cont Fosamax, add vit D and calcium    Orders:  -     alendronate (FOSAMAX) 70 MG tablet; Take 1 tablet by mouth Every 7 (Seven) Days.  Dispense: 12 tablet; Refill: 1    5. Arthritis  Assessment & Plan:  Chronic, stable, cont celebrex    Orders:  -     celecoxib (CeleBREX) 200 MG capsule; Take 1 capsule by mouth Daily.  Dispense: 90 capsule; Refill: 1    6. Need for hepatitis C screening test  -     Hepatitis C Antibody; Future    7. Screening for endocrine disorder  -     TSH Rfx On Abnormal To Free T4; Future    8. Former smoker  -      CT Chest Low Dose Cancer Screening WO; Future    9. Need for vaccination  -     Pneumococcal Conjugate Vaccine 20-Valent (PCV20)    10. Microscopic hematuria  Assessment & Plan:  Will monitor with BMP and u/a yearly as dir, f/u with urology prn        Follow Up:   Return in about 6 months (around 1/27/2023) for Recheck.     An After Visit Summary and PPPS were made available to the patient.

## 2022-07-28 ENCOUNTER — TELEPHONE (OUTPATIENT)
Dept: INTERNAL MEDICINE | Facility: CLINIC | Age: 77
End: 2022-07-28

## 2022-07-28 NOTE — TELEPHONE ENCOUNTER
Called and lvm for patient to return call, office number given.     HUB: if patient returns call please relay providers message:   Labs all looked good and are within acceptable ranges.

## 2022-07-28 NOTE — TELEPHONE ENCOUNTER
----- Message from Whitley Treadwell PA-C sent at 7/28/2022  2:17 PM EDT -----  Labs all looked good and are within acceptable ranges

## 2022-07-29 ENCOUNTER — TRANSCRIBE ORDERS (OUTPATIENT)
Dept: ADMINISTRATIVE | Facility: HOSPITAL | Age: 77
End: 2022-07-29

## 2022-07-29 DIAGNOSIS — Z12.31 VISIT FOR SCREENING MAMMOGRAM: Primary | ICD-10-CM

## 2022-08-24 ENCOUNTER — APPOINTMENT (OUTPATIENT)
Dept: MAMMOGRAPHY | Facility: HOSPITAL | Age: 77
End: 2022-08-24

## 2022-08-26 ENCOUNTER — HOSPITAL ENCOUNTER (OUTPATIENT)
Dept: MAMMOGRAPHY | Facility: HOSPITAL | Age: 77
Discharge: HOME | End: 2022-08-26
Admitting: PHYSICIAN ASSISTANT

## 2022-08-26 ENCOUNTER — APPOINTMENT (OUTPATIENT)
Dept: OTHER | Facility: HOSPITAL | Age: 77
End: 2022-08-26

## 2022-08-26 DIAGNOSIS — Z12.31 ENCOUNTER FOR SCREENING MAMMOGRAM FOR BREAST CANCER: ICD-10-CM

## 2022-08-26 PROCEDURE — 77063 BREAST TOMOSYNTHESIS BI: CPT | Performed by: RADIOLOGY

## 2022-08-26 PROCEDURE — 77067 SCR MAMMO BI INCL CAD: CPT | Performed by: RADIOLOGY

## 2022-08-26 PROCEDURE — 77067 SCR MAMMO BI INCL CAD: CPT

## 2022-08-26 PROCEDURE — 77063 BREAST TOMOSYNTHESIS BI: CPT

## 2022-08-31 ENCOUNTER — TELEPHONE (OUTPATIENT)
Dept: INTERNAL MEDICINE | Facility: CLINIC | Age: 77
End: 2022-08-31

## 2022-09-01 NOTE — TELEPHONE ENCOUNTER
Called and lvm for patient to return call, office number given.     HUB: if patient returns call please relay providers message:   ----- Message from Whitley Treadwell PA-C sent at 8/31/2022  9:00 AM EDT -----  Mammogram was incomplete and additional images are recommended. Someone should call you to schedule this, let me know if you don't hear about an appt for repeat mammogram in the next week or two.

## 2022-09-02 NOTE — TELEPHONE ENCOUNTER
2nd attempt to contact patient.     HUB: if patient returns call please relay providers message:   ----- Message from Whitley Treadwell PA-C sent at 8/31/2022  9:00 AM EDT -----  Mammogram was incomplete and additional images are recommended. Someone should call you to schedule this, let me know if you don't hear about an appt for repeat mammogram in the next week or two.

## 2022-09-06 NOTE — TELEPHONE ENCOUNTER
3rd and final attempt to contact patient.     HUB: if patient returns call please relay providers message:   ----- Message from Whitley Treadwell PA-C sent at 8/31/2022  9:00 AM EDT -----  Mammogram was incomplete and additional images are recommended. Someone should call you to schedule this, let me know if you don't hear about an appt for repeat mammogram in the next week or two.

## 2022-11-30 ENCOUNTER — HOSPITAL ENCOUNTER (OUTPATIENT)
Dept: MAMMOGRAPHY | Facility: HOSPITAL | Age: 77
Discharge: HOME | End: 2022-11-30
Admitting: RADIOLOGY

## 2022-11-30 ENCOUNTER — TELEPHONE (OUTPATIENT)
Dept: INTERNAL MEDICINE | Facility: CLINIC | Age: 77
End: 2022-11-30

## 2022-11-30 DIAGNOSIS — R92.8 ABNORMAL MAMMOGRAM: ICD-10-CM

## 2022-11-30 PROCEDURE — 77066 DX MAMMO INCL CAD BI: CPT

## 2022-11-30 PROCEDURE — G0279 TOMOSYNTHESIS, MAMMO: HCPCS | Performed by: RADIOLOGY

## 2022-11-30 PROCEDURE — 77066 DX MAMMO INCL CAD BI: CPT | Performed by: RADIOLOGY

## 2022-11-30 PROCEDURE — G0279 TOMOSYNTHESIS, MAMMO: HCPCS

## 2023-01-30 ENCOUNTER — OFFICE VISIT (OUTPATIENT)
Dept: INTERNAL MEDICINE | Facility: CLINIC | Age: 78
End: 2023-01-30
Payer: MEDICARE

## 2023-01-30 VITALS
SYSTOLIC BLOOD PRESSURE: 118 MMHG | OXYGEN SATURATION: 98 % | RESPIRATION RATE: 20 BRPM | DIASTOLIC BLOOD PRESSURE: 70 MMHG | HEART RATE: 66 BPM | HEIGHT: 60 IN | TEMPERATURE: 97.1 F | BODY MASS INDEX: 31.02 KG/M2 | WEIGHT: 158 LBS

## 2023-01-30 DIAGNOSIS — J44.9 CHRONIC OBSTRUCTIVE PULMONARY DISEASE, UNSPECIFIED COPD TYPE: Primary | ICD-10-CM

## 2023-01-30 DIAGNOSIS — M81.0 OSTEOPOROSIS WITHOUT CURRENT PATHOLOGICAL FRACTURE, UNSPECIFIED OSTEOPOROSIS TYPE: ICD-10-CM

## 2023-01-30 DIAGNOSIS — E78.5 HYPERLIPIDEMIA, UNSPECIFIED HYPERLIPIDEMIA TYPE: ICD-10-CM

## 2023-01-30 DIAGNOSIS — M19.90 ARTHRITIS: ICD-10-CM

## 2023-01-30 DIAGNOSIS — Z87.891 HISTORY OF CIGARETTE SMOKING: ICD-10-CM

## 2023-01-30 PROCEDURE — 99214 OFFICE O/P EST MOD 30 MIN: CPT | Performed by: PHYSICIAN ASSISTANT

## 2023-01-30 NOTE — ASSESSMENT & PLAN NOTE
Chronic, stable, cont Celebrex and prn tylenol. Suggested PT to help with improving ROM and strength of shoulders, knee, pt will think on this

## 2023-01-30 NOTE — PROGRESS NOTES
Chief Complaint  COPD and Hyperlipidemia    Subjective          History of Present Illness  Jackie Cordova presents to [facility] for   History of Present Illness  HLD:  Well controlled on Simvastatin 40mg. No AE  Lab Results       Component                Value               Date                       CHOL                     167                 07/27/2022                 TRIG                     82                  07/27/2022                 HDL                      73 (H)              07/27/2022                 LDL                      79                  07/27/2022              Kidney Cyst/Hematuria:  Did see urology, had benign w/u for hematuria w/cystoscopy, repeat BMP and u/a yearly and f/u prn.    Osteoporosis:  Last dexa scan a few years ago. Takes Fosamax weekly. Has been on it for the last 2 years.     COPD:  On Breo, not using daily. Does not have wheeze or SOA very often, just when she gets sick with bronchitis or if she goes on a long walk. She has been using albuterol a few times a day and the Breo infrequently.     Arthritis:  She will have joint pain with rainy weather. She has pain in elbows, knees, shoulders, occ hand pain but not too bad, sometimes has knuckle swelling. Occ rt knee swelling, had sx on this knee previously. Takes tylenol pm and celebrex for pain.        The following portions of the patient's history were reviewed and updated as appropriate: allergies, current medications, past family history, past medical history, past social history, past surgical history and problem list.  No Known Allergies    Current Outpatient Medications:   •  albuterol sulfate  (90 Base) MCG/ACT inhaler, Inhale 2 puffs Every 4 (Four) Hours As Needed for Wheezing., Disp: 18 g, Rfl: 1  •  alendronate (FOSAMAX) 70 MG tablet, Take 1 tablet by mouth Every 7 (Seven) Days., Disp: 12 tablet, Rfl: 1  •  celecoxib (CeleBREX) 200 MG capsule, Take 1 capsule by mouth Daily., Disp:  "90 capsule, Rfl: 1  •  diphenhydrAMINE-APAP, sleep, (TYLENOL PM EXTRA STRENGTH PO), Take  by mouth., Disp: , Rfl:   •  Fluticasone Furoate-Vilanterol (Breo Ellipta) 100-25 MCG/INH inhaler, Inhale 1 puff Daily., Disp: 28 each, Rfl: 5  •  simvastatin (ZOCOR) 40 MG tablet, Take 1 tablet by mouth Every Night., Disp: 90 tablet, Rfl: 1  No orders of the defined types were placed in this encounter.    Social History     Tobacco Use   Smoking Status Former   • Packs/day: 0.50   • Years: 40.00   • Pack years: 20.00   • Types: Cigarettes   • Start date:    • Quit date:    • Years since quittin.0   Smokeless Tobacco Never        Objective   Vital Signs:   Vitals:    23 1053   BP: 118/70   Pulse: 66   Resp: 20   Temp: 97.1 °F (36.2 °C)   TempSrc: Temporal   SpO2: 98%   Weight: 71.7 kg (158 lb)   Height: 152.4 cm (60\")   PainSc: 0-No pain      Physical Exam  Vitals reviewed.   Constitutional:       General: She is not in acute distress.     Appearance: Normal appearance.   HENT:      Head: Normocephalic and atraumatic.   Eyes:      General: No scleral icterus.     Extraocular Movements: Extraocular movements intact.      Conjunctiva/sclera: Conjunctivae normal.   Cardiovascular:      Rate and Rhythm: Normal rate and regular rhythm.      Heart sounds: Normal heart sounds. No murmur heard.  Pulmonary:      Effort: Pulmonary effort is normal. No respiratory distress.      Breath sounds: Normal breath sounds. No stridor. No wheezing or rhonchi.   Musculoskeletal:      Cervical back: Normal range of motion and neck supple.   Skin:     General: Skin is warm and dry.      Coloration: Skin is not jaundiced.   Neurological:      General: No focal deficit present.      Mental Status: She is alert and oriented to person, place, and time.      Gait: Gait normal.   Psychiatric:         Mood and Affect: Mood normal.         Behavior: Behavior normal.        No LMP recorded. Patient is postmenopausal.    Result Review : "                   Assessment and Plan    Diagnoses and all orders for this visit:    1. Chronic obstructive pulmonary disease, unspecified COPD type (HCC) (Primary)  Assessment & Plan:  COPD is improving with treatment.  Continue current medications.  Reviewed to use Breo daily and albuterol only prn if needed.     Orders:  -      CT Chest Low Dose Cancer Screening WO; Future    2. Hyperlipidemia, unspecified hyperlipidemia type  Assessment & Plan:  Lipid abnormalities are improving with treatment.  Nutritional counseling was provided. and Pharmacotherapy as ordered.  Lipids will be reassessed in 6 months. Cont Zocor      3. Arthritis  Assessment & Plan:  Chronic, stable, cont Celebrex and prn tylenol. Suggested PT to help with improving ROM and strength of shoulders, knee, pt will think on this       4. History of cigarette smoking  -      CT Chest Low Dose Cancer Screening WO; Future    5. Osteoporosis without current pathological fracture, unspecified osteoporosis type  Assessment & Plan:  Cont Fosamax, start Vit D and Calcium.         Follow Up   Return in about 6 months (around 7/30/2023) for Yearly Physical, Routine Labs.    Follow up if symptoms worsen or persist or has new or concerning symptoms, go to ER for severe symptoms.   Reviewed common medication effects and side effects and advised to report side effects immediately.  Encouraged medication compliance and the importance of keeping scheduled follow up appointments with me and any other providers.  If a referral was made please contact our office if you have not heard about an appointment in the next 2 weeks.   If labs or images are ordered we will contact you with the results within the next week.  If you have not heard from us after a week please call our office to inquire about the results.   Patient was given instructions and counseling regarding her condition or for health maintenance advice. Please see specific information pulled into the AVS if  appropriate.     Whitley Treadwell PA-C    * Please note that portions of this note were completed with a voice recognition program.

## 2023-01-30 NOTE — ASSESSMENT & PLAN NOTE
COPD is improving with treatment.  Continue current medications.  Reviewed to use Breo daily and albuterol only prn if needed.

## 2023-02-22 DIAGNOSIS — M19.90 ARTHRITIS: ICD-10-CM

## 2023-02-22 DIAGNOSIS — E78.5 HYPERLIPIDEMIA, UNSPECIFIED HYPERLIPIDEMIA TYPE: ICD-10-CM

## 2023-02-22 RX ORDER — CELECOXIB 200 MG/1
CAPSULE ORAL
Qty: 90 CAPSULE | Refills: 1 | Status: SHIPPED | OUTPATIENT
Start: 2023-02-22 | End: 2023-03-24 | Stop reason: HOSPADM

## 2023-02-22 RX ORDER — SIMVASTATIN 40 MG
TABLET ORAL
Qty: 90 TABLET | Refills: 1 | Status: SHIPPED | OUTPATIENT
Start: 2023-02-22 | End: 2023-03-24 | Stop reason: HOSPADM

## 2023-02-24 ENCOUNTER — TELEPHONE (OUTPATIENT)
Dept: INTERNAL MEDICINE | Facility: CLINIC | Age: 78
End: 2023-02-24
Payer: MEDICARE

## 2023-02-24 ENCOUNTER — HOSPITAL ENCOUNTER (OUTPATIENT)
Dept: CT IMAGING | Facility: HOSPITAL | Age: 78
Discharge: HOME | End: 2023-02-24
Admitting: PHYSICIAN ASSISTANT
Payer: MEDICARE

## 2023-02-24 DIAGNOSIS — Z87.891 HISTORY OF CIGARETTE SMOKING: ICD-10-CM

## 2023-02-24 DIAGNOSIS — J44.9 CHRONIC OBSTRUCTIVE PULMONARY DISEASE, UNSPECIFIED COPD TYPE: ICD-10-CM

## 2023-02-24 PROCEDURE — 71271 CT THORAX LUNG CANCER SCR C-: CPT

## 2023-02-24 NOTE — TELEPHONE ENCOUNTER
----- Message from Whitley Treadwell PA-C sent at 2/24/2023 11:23 AM EST -----  Chest CT looked good and was normal, no nodules noted.

## 2023-02-24 NOTE — TELEPHONE ENCOUNTER
OK FOR HUB TO RELAY LAB RESULTS   Called pt lft  vm with cb number     ----- Message from Whitley Treadwell PA-C sent at 2/24/2023 11:23 AM EST -----  Chest CT looked good and was normal, no nodules noted.

## 2023-02-24 NOTE — TELEPHONE ENCOUNTER
LVM for pt to return call with office #.     HUB - please review the following with pt. - Chest CT looked good and was normal, no nodules noted.

## 2023-03-21 ENCOUNTER — APPOINTMENT (OUTPATIENT)
Dept: CT IMAGING | Facility: HOSPITAL | Age: 78
DRG: 560 | End: 2023-03-21
Payer: MEDICARE

## 2023-03-21 ENCOUNTER — APPOINTMENT (OUTPATIENT)
Dept: GENERAL RADIOLOGY | Facility: HOSPITAL | Age: 78
DRG: 560 | End: 2023-03-21
Payer: MEDICARE

## 2023-03-21 ENCOUNTER — HOSPITAL ENCOUNTER (INPATIENT)
Facility: HOSPITAL | Age: 78
LOS: 3 days | Discharge: ANOTHER HOSPITAL | DRG: 560 | End: 2023-03-24
Attending: EMERGENCY MEDICINE | Admitting: INTERNAL MEDICINE
Payer: MEDICARE

## 2023-03-21 DIAGNOSIS — R03.0 ELEVATED BLOOD PRESSURE READING: ICD-10-CM

## 2023-03-21 DIAGNOSIS — N17.9 AKI (ACUTE KIDNEY INJURY): ICD-10-CM

## 2023-03-21 DIAGNOSIS — R53.1 GENERALIZED WEAKNESS: Primary | ICD-10-CM

## 2023-03-21 DIAGNOSIS — T79.6XXA TRAUMATIC RHABDOMYOLYSIS, INITIAL ENCOUNTER: ICD-10-CM
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
## 2023-03-21 DIAGNOSIS — Z87.891 FORMER SMOKER: ICD-10-CM

## 2023-03-21 PROBLEM — D72.829 LEUKOCYTOSIS: Status: ACTIVE | Noted: 2023-03-21

## 2023-03-21 PROBLEM — M62.82 RHABDOMYOLYSIS: Status: ACTIVE | Noted: 2023-03-21

## 2023-03-21 LAB
ALBUMIN SERPL-MCNC: 4.6 G/DL (ref 3.5–5.2)
ALBUMIN/GLOB SERPL: 1.6 G/DL
ALP SERPL-CCNC: 98 U/L (ref 39–117)
ALT SERPL W P-5'-P-CCNC: 17 U/L (ref 1–33)
ANION GAP SERPL CALCULATED.3IONS-SCNC: 15 MMOL/L (ref 5–15)
AST SERPL-CCNC: 36 U/L (ref 1–32)
BASOPHILS # BLD AUTO: 0.02 10*3/MM3 (ref 0–0.2)
BASOPHILS NFR BLD AUTO: 0.1 % (ref 0–1.5)
BILIRUB SERPL-MCNC: 0.7 MG/DL (ref 0–1.2)
BILIRUB UR QL STRIP: NEGATIVE
BUN SERPL-MCNC: 62 MG/DL (ref 8–23)
BUN/CREAT SERPL: 45.6 (ref 7–25)
CALCIUM SPEC-SCNC: 10.3 MG/DL (ref 8.6–10.5)
CHLORIDE SERPL-SCNC: 104 MMOL/L (ref 98–107)
CK SERPL-CCNC: 1033 U/L (ref 20–180)
CLARITY UR: CLEAR
CO2 SERPL-SCNC: 22 MMOL/L (ref 22–29)
COLOR UR: ABNORMAL
CREAT SERPL-MCNC: 1.36 MG/DL (ref 0.57–1)
DEPRECATED RDW RBC AUTO: 46.2 FL (ref 37–54)
EGFRCR SERPLBLD CKD-EPI 2021: 40.5 ML/MIN/1.73
EOSINOPHIL # BLD AUTO: 0.02 10*3/MM3 (ref 0–0.4)
EOSINOPHIL NFR BLD AUTO: 0.1 % (ref 0.3–6.2)
ERYTHROCYTE [DISTWIDTH] IN BLOOD BY AUTOMATED COUNT: 13.5 % (ref 12.3–15.4)
GLOBULIN UR ELPH-MCNC: 2.9 GM/DL
GLUCOSE SERPL-MCNC: 165 MG/DL (ref 65–99)
GLUCOSE UR STRIP-MCNC: NEGATIVE MG/DL
HCT VFR BLD AUTO: 36.9 % (ref 34–46.6)
HGB BLD-MCNC: 12 G/DL (ref 12–15.9)
HGB UR QL STRIP.AUTO: NEGATIVE
HOLD SPECIMEN: NORMAL
IMM GRANULOCYTES # BLD AUTO: 0.09 10*3/MM3 (ref 0–0.05)
IMM GRANULOCYTES NFR BLD AUTO: 0.5 % (ref 0–0.5)
KETONES UR QL STRIP: ABNORMAL
LEUKOCYTE ESTERASE UR QL STRIP.AUTO: NEGATIVE
LYMPHOCYTES # BLD AUTO: 1.04 10*3/MM3 (ref 0.7–3.1)
LYMPHOCYTES NFR BLD AUTO: 5.5 % (ref 19.6–45.3)
MAGNESIUM SERPL-MCNC: 2.5 MG/DL (ref 1.6–2.4)
MCH RBC QN AUTO: 30.2 PG (ref 26.6–33)
MCHC RBC AUTO-ENTMCNC: 32.5 G/DL (ref 31.5–35.7)
MCV RBC AUTO: 92.7 FL (ref 79–97)
MONOCYTES # BLD AUTO: 1.25 10*3/MM3 (ref 0.1–0.9)
MONOCYTES NFR BLD AUTO: 6.7 % (ref 5–12)
NEUTROPHILS NFR BLD AUTO: 16.37 10*3/MM3 (ref 1.7–7)
NEUTROPHILS NFR BLD AUTO: 87.1 % (ref 42.7–76)
NITRITE UR QL STRIP: NEGATIVE
NRBC BLD AUTO-RTO: 0 /100 WBC (ref 0–0.2)
PH UR STRIP.AUTO: <=5 [PH] (ref 5–8)
PLATELET # BLD AUTO: 423 10*3/MM3 (ref 140–450)
PMV BLD AUTO: 9.8 FL (ref 6–12)
POTASSIUM SERPL-SCNC: 4.7 MMOL/L (ref 3.5–5.2)
PROT SERPL-MCNC: 7.5 G/DL (ref 6–8.5)
PROT UR QL STRIP: ABNORMAL
RBC # BLD AUTO: 3.98 10*6/MM3 (ref 3.77–5.28)
SODIUM SERPL-SCNC: 141 MMOL/L (ref 136–145)
SP GR UR STRIP: 1.02 (ref 1–1.03)
TROPONIN T SERPL HS-MCNC: 22 NG/L
UROBILINOGEN UR QL STRIP: ABNORMAL
WBC NRBC COR # BLD: 18.79 10*3/MM3 (ref 3.4–10.8)
WHOLE BLOOD HOLD COAG: NORMAL
WHOLE BLOOD HOLD SPECIMEN: NORMAL

## 2023-03-21 PROCEDURE — 94640 AIRWAY INHALATION TREATMENT: CPT

## 2023-03-21 PROCEDURE — P9612 CATHETERIZE FOR URINE SPEC: HCPCS

## 2023-03-21 PROCEDURE — 72170 X-RAY EXAM OF PELVIS: CPT

## 2023-03-21 PROCEDURE — 80053 COMPREHEN METABOLIC PANEL: CPT | Performed by: EMERGENCY MEDICINE

## 2023-03-21 PROCEDURE — 82550 ASSAY OF CK (CPK): CPT | Performed by: EMERGENCY MEDICINE

## 2023-03-21 PROCEDURE — 99222 1ST HOSP IP/OBS MODERATE 55: CPT | Performed by: INTERNAL MEDICINE

## 2023-03-21 PROCEDURE — 81003 URINALYSIS AUTO W/O SCOPE: CPT | Performed by: EMERGENCY MEDICINE

## 2023-03-21 PROCEDURE — 70450 CT HEAD/BRAIN W/O DYE: CPT

## 2023-03-21 PROCEDURE — 84484 ASSAY OF TROPONIN QUANT: CPT | Performed by: EMERGENCY MEDICINE

## 2023-03-21 PROCEDURE — 25010000002 HEPARIN (PORCINE) PER 1000 UNITS: Performed by: INTERNAL MEDICINE

## 2023-03-21 PROCEDURE — 93005 ELECTROCARDIOGRAM TRACING: CPT | Performed by: EMERGENCY MEDICINE

## 2023-03-21 PROCEDURE — 83735 ASSAY OF MAGNESIUM: CPT | Performed by: EMERGENCY MEDICINE

## 2023-03-21 PROCEDURE — 93005 ELECTROCARDIOGRAM TRACING: CPT

## 2023-03-21 PROCEDURE — 73560 X-RAY EXAM OF KNEE 1 OR 2: CPT

## 2023-03-21 PROCEDURE — 71045 X-RAY EXAM CHEST 1 VIEW: CPT

## 2023-03-21 PROCEDURE — 99285 EMERGENCY DEPT VISIT HI MDM: CPT

## 2023-03-21 PROCEDURE — 73552 X-RAY EXAM OF FEMUR 2/>: CPT

## 2023-03-21 PROCEDURE — 85025 COMPLETE CBC W/AUTO DIFF WBC: CPT | Performed by: EMERGENCY MEDICINE

## 2023-03-21 RX ORDER — ACETAMINOPHEN 160 MG/5ML
650 SOLUTION ORAL EVERY 4 HOURS PRN
Status: DISCONTINUED | OUTPATIENT
Start: 2023-03-21 | End: 2023-03-24 | Stop reason: HOSPADM

## 2023-03-21 RX ORDER — BISACODYL 5 MG/1
5 TABLET, DELAYED RELEASE ORAL DAILY PRN
Status: DISCONTINUED | OUTPATIENT
Start: 2023-03-21 | End: 2023-03-24 | Stop reason: HOSPADM

## 2023-03-21 RX ORDER — ONDANSETRON 2 MG/ML
4 INJECTION INTRAMUSCULAR; INTRAVENOUS EVERY 6 HOURS PRN
Status: DISCONTINUED | OUTPATIENT
Start: 2023-03-21 | End: 2023-03-24 | Stop reason: HOSPADM

## 2023-03-21 RX ORDER — SODIUM CHLORIDE 0.9 % (FLUSH) 0.9 %
10 SYRINGE (ML) INJECTION EVERY 12 HOURS SCHEDULED
Status: DISCONTINUED | OUTPATIENT
Start: 2023-03-21 | End: 2023-03-24 | Stop reason: HOSPADM

## 2023-03-21 RX ORDER — SODIUM CHLORIDE 0.9 % (FLUSH) 0.9 %
10 SYRINGE (ML) INJECTION AS NEEDED
Status: DISCONTINUED | OUTPATIENT
Start: 2023-03-21 | End: 2023-03-24 | Stop reason: HOSPADM

## 2023-03-21 RX ORDER — ALBUTEROL SULFATE 2.5 MG/3ML
2.5 SOLUTION RESPIRATORY (INHALATION) EVERY 4 HOURS PRN
Status: DISCONTINUED | OUTPATIENT
Start: 2023-03-21 | End: 2023-03-24 | Stop reason: HOSPADM

## 2023-03-21 RX ORDER — POLYETHYLENE GLYCOL 3350 17 G/17G
17 POWDER, FOR SOLUTION ORAL DAILY PRN
Status: DISCONTINUED | OUTPATIENT
Start: 2023-03-21 | End: 2023-03-24 | Stop reason: HOSPADM

## 2023-03-21 RX ORDER — BUDESONIDE AND FORMOTEROL FUMARATE DIHYDRATE 160; 4.5 UG/1; UG/1
1 AEROSOL RESPIRATORY (INHALATION)
Status: DISCONTINUED | OUTPATIENT
Start: 2023-03-21 | End: 2023-03-24 | Stop reason: HOSPADM

## 2023-03-21 RX ORDER — AMOXICILLIN 250 MG
2 CAPSULE ORAL 2 TIMES DAILY
Status: DISCONTINUED | OUTPATIENT
Start: 2023-03-21 | End: 2023-03-24 | Stop reason: HOSPADM

## 2023-03-21 RX ORDER — HYDROCODONE BITARTRATE AND ACETAMINOPHEN 5; 325 MG/1; MG/1
1 TABLET ORAL EVERY 4 HOURS PRN
Status: DISCONTINUED | OUTPATIENT
Start: 2023-03-21 | End: 2023-03-24 | Stop reason: HOSPADM

## 2023-03-21 RX ORDER — NALOXONE HCL 0.4 MG/ML
0.4 VIAL (ML) INJECTION
Status: DISCONTINUED | OUTPATIENT
Start: 2023-03-21 | End: 2023-03-24 | Stop reason: HOSPADM

## 2023-03-21 RX ORDER — HEPARIN SODIUM 5000 [USP'U]/ML
5000 INJECTION, SOLUTION INTRAVENOUS; SUBCUTANEOUS EVERY 8 HOURS SCHEDULED
Status: DISCONTINUED | OUTPATIENT
Start: 2023-03-21 | End: 2023-03-24 | Stop reason: HOSPADM

## 2023-03-21 RX ORDER — SODIUM CHLORIDE 9 MG/ML
125 INJECTION, SOLUTION INTRAVENOUS CONTINUOUS
Status: DISCONTINUED | OUTPATIENT
Start: 2023-03-21 | End: 2023-03-23

## 2023-03-21 RX ORDER — ONDANSETRON 4 MG/1
4 TABLET, FILM COATED ORAL EVERY 6 HOURS PRN
Status: DISCONTINUED | OUTPATIENT
Start: 2023-03-21 | End: 2023-03-24 | Stop reason: HOSPADM

## 2023-03-21 RX ORDER — ACETAMINOPHEN 650 MG/1
650 SUPPOSITORY RECTAL EVERY 4 HOURS PRN
Status: DISCONTINUED | OUTPATIENT
Start: 2023-03-21 | End: 2023-03-24 | Stop reason: HOSPADM

## 2023-03-21 RX ORDER — BISACODYL 10 MG
10 SUPPOSITORY, RECTAL RECTAL DAILY PRN
Status: DISCONTINUED | OUTPATIENT
Start: 2023-03-21 | End: 2023-03-24 | Stop reason: HOSPADM

## 2023-03-21 RX ORDER — MORPHINE SULFATE 2 MG/ML
2 INJECTION, SOLUTION INTRAMUSCULAR; INTRAVENOUS
Status: DISCONTINUED | OUTPATIENT
Start: 2023-03-21 | End: 2023-03-24 | Stop reason: HOSPADM

## 2023-03-21 RX ORDER — ACETAMINOPHEN 325 MG/1
650 TABLET ORAL EVERY 4 HOURS PRN
Start: 2023-03-21

## 2023-03-21 RX ORDER — SODIUM CHLORIDE 9 MG/ML
40 INJECTION, SOLUTION INTRAVENOUS AS NEEDED
Status: DISCONTINUED | OUTPATIENT
Start: 2023-03-21 | End: 2023-03-24 | Stop reason: HOSPADM

## 2023-03-21 RX ORDER — CHOLECALCIFEROL (VITAMIN D3) 125 MCG
5 CAPSULE ORAL NIGHTLY PRN
Status: DISCONTINUED | OUTPATIENT
Start: 2023-03-21 | End: 2023-03-24 | Stop reason: HOSPADM

## 2023-03-21 RX ORDER — HYDROCODONE BITARTRATE AND ACETAMINOPHEN 5; 325 MG/1; MG/1
1 TABLET ORAL EVERY 4 HOURS PRN
Refills: 0
Start: 2023-03-21 | End: 2023-03-28

## 2023-03-21 RX ORDER — ACETAMINOPHEN 325 MG/1
650 TABLET ORAL EVERY 4 HOURS PRN
Status: DISCONTINUED | OUTPATIENT
Start: 2023-03-21 | End: 2023-03-24 | Stop reason: HOSPADM

## 2023-03-21 RX ADMIN — SODIUM CHLORIDE 125 ML/HR: 9 INJECTION, SOLUTION INTRAVENOUS at 16:19

## 2023-03-21 RX ADMIN — BUDESONIDE AND FORMOTEROL FUMARATE DIHYDRATE 1 PUFF: 160; 4.5 AEROSOL RESPIRATORY (INHALATION) at 20:40

## 2023-03-21 RX ADMIN — SENNOSIDES AND DOCUSATE SODIUM 2 TABLET: 8.6; 5 TABLET ORAL at 20:28

## 2023-03-21 RX ADMIN — HEPARIN SODIUM 5000 UNITS: 5000 INJECTION INTRAVENOUS; SUBCUTANEOUS at 20:28

## 2023-03-21 RX ADMIN — SODIUM CHLORIDE, POTASSIUM CHLORIDE, SODIUM LACTATE AND CALCIUM CHLORIDE 1000 ML: 600; 310; 30; 20 INJECTION, SOLUTION INTRAVENOUS at 13:11

## 2023-03-21 NOTE — PLAN OF CARE
Goal Outcome Evaluation:   Patient oriented to room and unit. Axox4, VS stable. On normal saline at 125 ml /hr. R knee swollen, leg externally rotated, R leg shorter than left, XR pending.  No pain at this time.

## 2023-03-21 NOTE — H&P
[facility] Medicine Services  HISTORY AND PHYSICAL    Patient Name: Jackie Cordova  : 1945  MRN: [id number]  Primary Care Physician: Whitley Treadwell PA-C  Date of admission: 3/21/2023      Subjective   Subjective     Chief Complaint: fall, weakness    HPI:  Jackie Cordova is a 77 y.o. female brought into ED via EMS after falling in bathtub yesterday. Describes the fall as purely mechanical. Denies LOC, trauma, pain but was unable to get up afterward. Was also unable to call for help. Her neighbor same in to check on her today and found her in tub. C/O right leg/knee pain.    Review of Systems   Gen- No fevers, chills  CV- No chest pain, palpitations  Resp- No cough, dyspnea  GI- No N/V/D, abd pain    Personal History     Past Medical History:   Diagnosis Date   • Arthritis    • Hyperlipidemia              Past Surgical History:   Procedure Laterality Date   • HIP SURGERY     • REPLACEMENT TOTAL KNEE         Family History: family history includes Arthritis in her brother, mother, and sister; Diabetes in her brother.     Social History:  reports that she quit smoking about 2 years ago. Her smoking use included cigarettes. She started smoking about 42 years ago. She has a 20.00 pack-year smoking history. She has never used smokeless tobacco. She reports current alcohol use. She reports that she does not use drugs.  Social History     Social History Narrative   • Not on file       Medications:  Available home medication information reviewed.  (Not in a hospital admission)      No Known Allergies    Objective   Objective     Vital Signs:   Temp:  [98 °F (36.7 °C)] 98 °F (36.7 °C)  Heart Rate:  [] 96  Resp:  [18] 18  BP: (122-150)/(70-99) 122/74       Physical Exam   Constitutional: Awake, alert, frail appearing  Eyes: PERRLA, sclerae anicteric, no conjunctival injection  HENT: NCAT, dry MM  Neck: Supple, no thyromegaly, no lymphadenopathy, trachea midline  Respiratory: Clear to  auscultation bilaterally, nonlabored respirations   Cardiovascular: Tachy, reg, no murmur  Gastrointestinal: Positive bowel sounds, soft, nontender, nondistended  Musculoskeletal: Pain in knee with passive rotation of RLE.   Psychiatric: Appropriate affect, cooperative  Neurologic: Oriented x 3, strength symmetric in all extremities, Cranial Nerves grossly intact to confrontation, speech clear  Skin: No rashes    Result Review:  I have personally reviewed the results from the time of this admission to 3/21/2023 13:37 EDT and agree with these findings:  []  Laboratory list / accordion  []  Microbiology  []  Radiology  []  EKG/Telemetry   []  Cardiology/Vascular   []  Pathology  []  Old records  []  Other:  Most notable findings include:         LAB RESULTS:      Lab 03/21/23  1157   WBC 18.79*   HEMOGLOBIN 12.0   HEMATOCRIT 36.9   PLATELETS 423   NEUTROS ABS 16.37*   IMMATURE GRANS (ABS) 0.09*   LYMPHS ABS 1.04   MONOS ABS 1.25*   EOS ABS 0.02   MCV 92.7         Lab 03/21/23  1157   SODIUM 141   POTASSIUM 4.7   CHLORIDE 104   CO2 22.0   ANION GAP 15.0   BUN 62*   CREATININE 1.36*   EGFR 40.5*   GLUCOSE 165*   CALCIUM 10.3   MAGNESIUM 2.5*         Lab 03/21/23  1157   TOTAL PROTEIN 7.5   ALBUMIN 4.6   GLOBULIN 2.9   ALT (SGPT) 17   AST (SGOT) 36*   BILIRUBIN 0.7   ALK PHOS 98         Lab 03/21/23  1157   HSTROP T 22*                 UA    Urinalysis 3/21/23   Specific Gravity, UA 1.025   Ketones, UA Trace (A)   Blood, UA Negative   Leukocytes, UA Negative   Nitrite, UA Negative   (A) Abnormal value              Microbiology Results (last 10 days)     ** No results found for the last 240 hours. **          XR Chest 1 View    Result Date: 3/21/2023  XR CHEST 1 VW Date of Exam: 3/21/2023 12:02 PM EDT Indication: Weak/Dizzy/AMS triage protocol. Comparison: None available. Findings: Heart size and vessels are within normal limits. Lungs are clear bilaterally. No pleural effusion or pneumothorax. There are severe  degenerative changes of both shoulders.     Impression: Impression: 1. No acute cardiopulmonary disease. Electronically Signed: Eduardo Landrum  3/21/2023 12:57 PM EDT  Workstation ID: [id number]          Assessment & Plan   Assessment & Plan     Active Hospital Problems    Diagnosis  POA   • **Fall [W19.XXXA]  Yes   • Generalized weakness [R53.1]  Yes   • BLAYNE (acute kidney injury) (HCC) [N17.9]  Yes   • Rhabdomyolysis [M62.82]  Yes   • Leukocytosis [D72.829]  Yes   • Arthritis [M19.90]  Yes   • Hyperlipidemia [E78.5]  Yes   • Chronic obstructive pulmonary disease (HCC) [J44.9]  Yes     76 y/o female presenting to ED after a fall at home found to have rhabdomyolysis and mild BLAYNE.    Fall  Mild BLAYNE  Rhabdomyolysis  --Start IVF and repeat BMP, CK in am.  --Hold statin, mobic.  --PT/OT in am.    Leukocytosis  --Likely due to stress response/volume depletion from above. U/A, CXR neg. Repeat in am.    Right knee pain  --Check xray hip, femur, knee.   --Pain control    OA  HLD  COPD    DVT prophylaxis:  [facility]      CODE STATUS:  Full Code  Code Status and Medical Interventions:   Ordered at: 03/21/23 1337     Code Status (Patient has no pulse and is not breathing):    CPR (Attempt to Resuscitate)     Medical Interventions (Patient has pulse or is breathing):    Full Support       Expected Discharge 3/24       Noelle Degroot II, DO  03/21/23

## 2023-03-21 NOTE — NURSING NOTE
Patient XR came back with femur fracture, Hospitalist and charge nurse notified. Patient to be transferred to ortho floor 3H.

## 2023-03-21 NOTE — ED PROVIDER NOTES
Subjective   History of Present Illness  Patient is a 77-year-old female reporting that she fell in her shower/bathtub yesterday morning.  She was unable to get out.  Patient reports she is unable to call for help.  Neighbor came in and found the patient to day in the bathtub still.  Patient denies any injuries associated with the fall.  Patient reports feeling generally weak.  No history of similar.  No fever or chills.  No chest pain or shortness of breath.  No pain with urination.  No unilateral deficit.  EMS did report that when they initially evaluated the patient they felt the patient was in SVT and gave her a dose of adenosine.  Patient denies a history of similar.  Review of the rhythm strip/EKG that was left via EMS demonstrates sinus rhythm with tremor artifact.    History provided by:  Patient and EMS personnel      Review of Systems    Past Medical History:   Diagnosis Date   • Arthritis    • COPD (chronic obstructive pulmonary disease) (HCC)    • Hyperlipidemia    • Osteoporosis        No Known Allergies    Past Surgical History:   Procedure Laterality Date   • HIP SURGERY     • REPLACEMENT TOTAL KNEE         Family History   Problem Relation Age of Onset   • Arthritis Mother    • Arthritis Sister    • Diabetes Brother    • Arthritis Brother    • Breast cancer Neg Hx        Social History     Socioeconomic History   • Marital status:    Tobacco Use   • Smoking status: Former     Packs/day: 0.50     Years: 40.00     Pack years: 20.00     Types: Cigarettes     Start date:      Quit date:      Years since quittin.2   • Smokeless tobacco: Never   Vaping Use   • Vaping Use: Never used   Substance and Sexual Activity   • Alcohol use: Yes     Comment: occasionally a glass of wine   • Drug use: Never   • Sexual activity: Not Currently           Objective   Physical Exam  Vitals and nursing note reviewed.   Constitutional:       General: She is not in acute distress.     Appearance: Normal  appearance. She is not toxic-appearing.   HENT:      Head: Normocephalic and atraumatic.   Cardiovascular:      Rate and Rhythm: Normal rate and regular rhythm.      Pulses: Normal pulses.   Pulmonary:      Effort: Pulmonary effort is normal. No respiratory distress.   Abdominal:      Palpations: Abdomen is soft.      Tenderness: There is no abdominal tenderness. There is no guarding.   Musculoskeletal:         General: No tenderness, deformity or signs of injury. Normal range of motion.      Comments: No focal midline C, T, L-spine tenderness to palpation or step-off.   Skin:     General: Skin is warm and dry.   Neurological:      Mental Status: She is alert and oriented to person, place, and time.   Psychiatric:         Mood and Affect: Mood normal.         Behavior: Behavior normal.         Procedures           ED Course  ED Course as of 03/21/23 1753   Tue Mar 21, 2023   1259 Creatinine(!): 1.36  BLAYNE noted when compared to recent. [RS]   1320 Creatine Kinase(!): 1,033  Elevated CK c/w rhabdo.    [RS]   1323 Patient is a 77-year-old female.  Patient's been extended amount of time unable to get out of the shower/bathtub.  Patient does have evidence of acute kidney injury as well as rhabdomyolysis.  We will plan admission for further evaluation and management.  Hospitalist messaged for admission. [RS]   1323 XR Chest 1 View  Chest x-ray demonstrates no focal lobar infiltrate.  See report radiology for details. [RS]      ED Course User Index  [RS] Sg Colmenares MD                                           Medical Decision Making  BLAYNE (acute kidney injury) (HCC): acute illness or injury  Elevated blood pressure reading: acute illness or injury  Former smoker: chronic illness or injury  Generalized weakness: acute illness or injury  Traumatic rhabdomyolysis, initial encounter (HCC): acute illness or injury  Amount and/or Complexity of Data Reviewed  Independent Historian: EMS  Labs: ordered. Decision-making  details documented in ED Course.  Radiology: ordered. Decision-making details documented in ED Course.  ECG/medicine tests: ordered.      Risk  Prescription drug management.  Decision regarding hospitalization.          Final diagnoses:   Generalized weakness   Traumatic rhabdomyolysis, initial encounter ([facility])   BLAYNE (acute kidney injury) (HCC)   Elevated blood pressure reading   Former smoker       ED Disposition  ED Disposition     ED Disposition   Decision to Admit    Condition   --    Comment   Level of Care: Telemetry [5]   Diagnosis: Generalized weakness [483417]               No follow-up provider specified.       Medication List      No changes were made to your prescriptions during this visit.          Sg Colmenares MD  03/21/23 3621

## 2023-03-21 NOTE — CASE MANAGEMENT/SOCIAL WORK
Continued Stay Note  [facility]     Patient Name: Jackie Cordova  MRN: [id number]  Today's Date: 3/21/2023    Admit Date: 3/21/2023    Plan: Pending transfer to    Discharge Plan     Row Name 03/21/23 1802       Plan    Plan Pending transfer to     Plan Comments CM received call from Hospitalist. Patient needing to go to  for a higher level of care. Awaiting bed availability/acceptance. Spoke with [facility] and will place ambulance on will call. Spoke with nurse Mendoza on 4G and informed her to call [facility] ambulance services when accepted to  and they will provide transport: [phone]. PCS form completed/on chartlet.    Final Discharge Disposition Code 30 - still a patient                                  Indy Ortiz RN

## 2023-03-21 NOTE — DISCHARGE SUMMARY
[facility] Medicine Services  DISCHARGE SUMMARY    Patient Name: Jackie Cordova  : 1945  MRN: [id number]    Date of Admission: 3/21/2023 11:49 AM  Date of Discharge:  3/21/2023  Primary Care Physician: Whitley Treadwell PA-C    Consults     Date and Time Order Name Status Description    3/21/2023  5:17 PM Inpatient Orthopedic Surgery Consult            Hospital Course     Presenting Problem:   Generalized weakness [R53.1]    Active Hospital Problems    Diagnosis  POA   • **Fall [W19.XXXA]  Yes   • Generalized weakness [R53.1]  Yes   • BLAYNE (acute kidney injury) (HCC) [N17.9]  Yes   • Rhabdomyolysis [M62.82]  Yes   • Leukocytosis [D72.829]  Yes   • Arthritis [M19.90]  Yes   • Hyperlipidemia [E78.5]  Yes   • Chronic obstructive pulmonary disease (HCC) [J44.9]  Yes      Resolved Hospital Problems   No resolved problems to display.          Hospital Course:    76 y/o female presenting to ED after a fall at home found to have comminuted/displaced periprosthetic femur fracture along with mild rhabdomyolysis and mild BLAYNE.    Mechanical fall.  Right comminuted/displaced periprosthetic femur fracture  --I spoke with Dr. Ramos who was on call for ortho. Given extent of fracture he recommended transfer to higher level of care for repair. I spoke with Dr. Hemphill who agrees to accept in transfer as well as Dr. Nieto from medicine who will consult.  --Immobilize RLE. Bed rest.  --Place lawton  --Pain control     Mild BLAYNE  Rhabdomyolysis  --Start IVF and repeat BMP, CK in am.  --Hold statin, mobic.     Leukocytosis  --Likely due to stress response/volume depletion from above. U/A, CXR neg. Repeat in am.      Discharge Follow Up Recommendations for outpatient labs/diagnostics:      Day of Discharge       Pertinent  and/or Most Recent Results     LAB RESULTS:      Lab 23  1157   WBC 18.79*   HEMOGLOBIN 12.0   HEMATOCRIT 36.9   PLATELETS 423   NEUTROS ABS 16.37*   IMMATURE GRANS (ABS) 0.09*    LYMPHS ABS 1.04   MONOS ABS 1.25*   EOS ABS 0.02   MCV 92.7         Lab 03/21/23  1157   SODIUM 141   POTASSIUM 4.7   CHLORIDE 104   CO2 22.0   ANION GAP 15.0   BUN 62*   CREATININE 1.36*   EGFR 40.5*   GLUCOSE 165*   CALCIUM 10.3   MAGNESIUM 2.5*         Lab 03/21/23  1157   TOTAL PROTEIN 7.5   ALBUMIN 4.6   GLOBULIN 2.9   ALT (SGPT) 17   AST (SGOT) 36*   BILIRUBIN 0.7   ALK PHOS 98         Lab 03/21/23  1157   HSTROP T 22*                 Brief Urine Lab Results  (Last result in the past 365 days)      Color   Clarity   Blood   Leuk Est   Nitrite   Protein   CREAT   Urine HCG        03/21/23 1231 Dark Yellow   Clear   Negative   Negative   Negative   Trace               Microbiology Results (last 10 days)     ** No results found for the last 240 hours. **          XR Femur 2 View Right    Result Date: 3/21/2023  XR FEMUR 2 VW RIGHT, XR KNEE 1 OR 2 VW RIGHT Date of Exam: 3/21/2023 1:53 PM EDT Indication: fall, pain. Comparison: None available. Findings: There is an acute displaced and comminuted fracture along the distal femoral shaft that extends to the arthroplasty along the distal femur. The hip and knee joints are congruent. A total hip arthroplasty appears intact. There is soft tissue swelling along the knee.     Impression: Acute displaced and comminuted fracture along distal femoral shaft, which extends to distal femoral arthroplasty. Electronically Signed: Cristhian Harmon  3/21/2023 2:40 PM EDT  Workstation ID: [id number]    XR Knee 1 or 2 View Right    Result Date: 3/21/2023  XR FEMUR 2 VW RIGHT, XR KNEE 1 OR 2 VW RIGHT Date of Exam: 3/21/2023 1:53 PM EDT Indication: fall, pain. Comparison: None available. Findings: There is an acute displaced and comminuted fracture along the distal femoral shaft that extends to the arthroplasty along the distal femur. The hip and knee joints are congruent. A total hip arthroplasty appears intact. There is soft tissue swelling along the knee.     Impression: Acute  displaced and comminuted fracture along distal femoral shaft, which extends to distal femoral arthroplasty. Electronically Signed: Cristhian Harmon  3/21/2023 2:40 PM EDT  Workstation ID: [id number]    CT Head Without Contrast    Result Date: 3/21/2023  CT HEAD WO CONTRAST Date of Exam: 3/21/2023 2:40 PM EDT Indication: fall w/ generalized weakness and tremor. Comparison: None available. Technique: Axial CT images were obtained of the head without contrast administration.  Reconstructed coronal and sagittal images were also obtained. Automated exposure control and iterative construction methods were used. Findings: There is mild generalized parenchymal volume loss. There are patchy and confluent areas of low-attenuation within the periventricular and subcortical white matter both cerebral hemispheres compatible changes of chronic small vessel ischemic disease. There is no evidence of acute infarct. No abnormal extra-axial fluid collections are identified. There is no mass effect or hydrocephalus.  No evidence of acute skull fracture. Visualized paranasal sinuses and mastoid air cells are clear.  Globes and orbits are within normal limits.     Impression: 1. No acute intracranial abnormality. 2. Generalized parenchymal volume loss and changes of chronic small vessel ischemic disease. Electronically Signed: Eduardo Landrum  3/21/2023 3:15 PM EDT  Workstation ID: [id number]    XR Chest 1 View    Result Date: 3/21/2023  XR CHEST 1 VW Date of Exam: 3/21/2023 12:02 PM EDT Indication: Weak/Dizzy/AMS triage protocol. Comparison: None available. Findings: Heart size and vessels are within normal limits. Lungs are clear bilaterally. No pleural effusion or pneumothorax. There are severe degenerative changes of both shoulders.     Impression: 1. No acute cardiopulmonary disease. Electronically Signed: Eduardo Landrum  3/21/2023 12:57 PM EDT  Workstation ID: [id number]    XR Pelvis 1 or 2 View    Result Date: 3/21/2023  XR PELVIS 1 OR 2 VW  Date of Exam: 3/21/2023 1:54 PM EDT Indication: fall, pain. Comparison: None available. Findings: There is diffuse osteopenia. Patient status post bilateral total hip arthroplasty. Distal portions of the femoral prosthetic components are not included within the field-of-view on this exam No fracture or dislocation identified. Soft tissues are unremarkable.     Impression: 1. Osteopenia 2. Status post bilateral total hip arthroplasty. No acute bony abnormality or hardware complication Electronically Signed: Eduardo Landrum  3/21/2023 2:44 PM EDT  Workstation ID: [id number]                  Plan for Follow-up of Pending Labs/Results:    Discharge Details        Discharge Medications      New Medications      Instructions Start Date   acetaminophen 325 MG tablet  Commonly known as: TYLENOL   650 mg, Oral, Every 4 Hours PRN      HYDROcodone-acetaminophen 5-325 MG per tablet  Commonly known as: NORCO   1 tablet, Oral, Every 4 Hours PRN         Continue These Medications      Instructions Start Date   albuterol sulfate  (90 Base) MCG/ACT inhaler  Commonly known as: PROVENTIL HFA;VENTOLIN HFA;PROAIR HFA   2 puffs, Inhalation, Every 4 Hours PRN      Fluticasone Furoate-Vilanterol 100-25 MCG/INH inhaler  Commonly known as: Breo Ellipta   1 puff, Inhalation, Daily - RT         Stop These Medications    celecoxib 200 MG capsule  Commonly known as: CeleBREX     simvastatin 40 MG tablet  Commonly known as: ZOCOR     TYLENOL PM EXTRA STRENGTH PO            No Known Allergies      Discharge Disposition:  Home or Self Care    Diet:  Hospital:  Diet Order   Procedures   • Diet: Regular/House Diet; Texture: Regular Texture (IDDSI 7); Fluid Consistency: Thin (IDDSI 0)       Activity:      Restrictions or Other Recommendations:       CODE STATUS:    Code Status and Medical Interventions:   Ordered at: 03/21/23 1337     Code Status (Patient has no pulse and is not breathing):    CPR (Attempt to Resuscitate)     Medical Interventions  (Patient has pulse or is breathing):    Full Support       Future Appointments   Date Time Provider Department Center   7/31/2023  9:30 AM Whitley Treadwell PA-C [facility] Degroot II,   03/21/23      Time Spent on Discharge:  I spent  90 minutes on this discharge activity which included: face-to-face encounter with the patient, reviewing the data in the system, coordination of the care with the nursing staff as well as consultants, documentation, and entering orders.

## 2023-03-21 NOTE — Clinical Note
Level of Care: Telemetry [5]   Diagnosis: Generalized weakness [075813]   Certification: I Certify That Inpatient Hospital Services Are Medically Necessary For Greater Than 2 Midnights

## 2023-03-22 PROBLEM — M97.8XXA PERIPROSTHETIC FRACTURE OF HIP: Status: ACTIVE | Noted: 2023-03-22

## 2023-03-22 PROBLEM — Z96.649 PERIPROSTHETIC FRACTURE OF HIP: Status: ACTIVE | Noted: 2023-03-22

## 2023-03-22 LAB
ANION GAP SERPL CALCULATED.3IONS-SCNC: 11 MMOL/L (ref 5–15)
BUN SERPL-MCNC: 47 MG/DL (ref 8–23)
BUN/CREAT SERPL: 58 (ref 7–25)
CALCIUM SPEC-SCNC: 8.9 MG/DL (ref 8.6–10.5)
CHLORIDE SERPL-SCNC: 108 MMOL/L (ref 98–107)
CK SERPL-CCNC: 1078 U/L (ref 20–180)
CO2 SERPL-SCNC: 21 MMOL/L (ref 22–29)
CREAT SERPL-MCNC: 0.81 MG/DL (ref 0.57–1)
DEPRECATED RDW RBC AUTO: 47.3 FL (ref 37–54)
EGFRCR SERPLBLD CKD-EPI 2021: 75.3 ML/MIN/1.73
ERYTHROCYTE [DISTWIDTH] IN BLOOD BY AUTOMATED COUNT: 13.8 % (ref 12.3–15.4)
GLUCOSE SERPL-MCNC: 89 MG/DL (ref 65–99)
HCT VFR BLD AUTO: 27 % (ref 34–46.6)
HGB BLD-MCNC: 8.9 G/DL (ref 12–15.9)
MCH RBC QN AUTO: 30.5 PG (ref 26.6–33)
MCHC RBC AUTO-ENTMCNC: 33 G/DL (ref 31.5–35.7)
MCV RBC AUTO: 92.5 FL (ref 79–97)
PLATELET # BLD AUTO: 293 10*3/MM3 (ref 140–450)
PMV BLD AUTO: 10.1 FL (ref 6–12)
POTASSIUM SERPL-SCNC: 3.8 MMOL/L (ref 3.5–5.2)
QT INTERVAL: 346 MS
QTC INTERVAL: 441 MS
RBC # BLD AUTO: 2.92 10*6/MM3 (ref 3.77–5.28)
SODIUM SERPL-SCNC: 140 MMOL/L (ref 136–145)
WBC NRBC COR # BLD: 12.88 10*3/MM3 (ref 3.4–10.8)

## 2023-03-22 PROCEDURE — 63710000001 ONDANSETRON PER 8 MG: Performed by: INTERNAL MEDICINE

## 2023-03-22 PROCEDURE — 94799 UNLISTED PULMONARY SVC/PX: CPT

## 2023-03-22 PROCEDURE — 94761 N-INVAS EAR/PLS OXIMETRY MLT: CPT

## 2023-03-22 PROCEDURE — 85027 COMPLETE CBC AUTOMATED: CPT | Performed by: INTERNAL MEDICINE

## 2023-03-22 PROCEDURE — 25010000002 HEPARIN (PORCINE) PER 1000 UNITS: Performed by: INTERNAL MEDICINE

## 2023-03-22 PROCEDURE — 80048 BASIC METABOLIC PNL TOTAL CA: CPT | Performed by: INTERNAL MEDICINE

## 2023-03-22 PROCEDURE — 82550 ASSAY OF CK (CPK): CPT | Performed by: INTERNAL MEDICINE

## 2023-03-22 RX ADMIN — ONDANSETRON HYDROCHLORIDE 4 MG: 4 TABLET, FILM COATED ORAL at 14:13

## 2023-03-22 RX ADMIN — HYDROCODONE BITARTRATE AND ACETAMINOPHEN 1 TABLET: 5; 325 TABLET ORAL at 12:17

## 2023-03-22 RX ADMIN — Medication 10 ML: at 08:07

## 2023-03-22 RX ADMIN — HEPARIN SODIUM 5000 UNITS: 5000 INJECTION INTRAVENOUS; SUBCUTANEOUS at 20:16

## 2023-03-22 RX ADMIN — BUDESONIDE AND FORMOTEROL FUMARATE DIHYDRATE 1 PUFF: 160; 4.5 AEROSOL RESPIRATORY (INHALATION) at 08:23

## 2023-03-22 RX ADMIN — SODIUM CHLORIDE 125 ML/HR: 9 INJECTION, SOLUTION INTRAVENOUS at 08:08

## 2023-03-22 RX ADMIN — SENNOSIDES AND DOCUSATE SODIUM 2 TABLET: 8.6; 5 TABLET ORAL at 20:16

## 2023-03-22 RX ADMIN — HEPARIN SODIUM 5000 UNITS: 5000 INJECTION INTRAVENOUS; SUBCUTANEOUS at 05:31

## 2023-03-22 RX ADMIN — BUDESONIDE AND FORMOTEROL FUMARATE DIHYDRATE 1 PUFF: 160; 4.5 AEROSOL RESPIRATORY (INHALATION) at 21:00

## 2023-03-22 RX ADMIN — HEPARIN SODIUM 5000 UNITS: 5000 INJECTION INTRAVENOUS; SUBCUTANEOUS at 14:08

## 2023-03-22 NOTE — PLAN OF CARE
Goal Outcome Evaluation:  Plan of Care Reviewed With: patient        Progress: no change  Outcome Evaluation: VSS. Patient waiting on a bed at [facility] for transfer for further care. Flores in place due to limited mobility. Right leg is warm, with good pulses. No complaints of numbing or tingling. Patient did have some pain today, which was relieved by oral PRN medication. Nausea medication also provided due to patient feeling nauseous.

## 2023-03-22 NOTE — SIGNIFICANT NOTE
I received a call during the overnight shift tonight (Tuesday) from the  transfer center who informed me that the patient has been accepted into their facility, but no bed is available at this time, however they will call hopefully within the next 24 hours when one opens up.  Discussed with Dr. Solo and the transfer center at .    Electronically signed by Hira Hernandez III, DO, 03/21/23, 10:00 PM EDT.

## 2023-03-22 NOTE — PLAN OF CARE
Goal Outcome Evaluation:                 Problem: Adult Inpatient Plan of Care  Goal: Absence of Hospital-Acquired Illness or Injury  Intervention: Identify and Manage Fall Risk  Recent Flowsheet Documentation  Taken 3/22/2023 0300 by Javy Banerjee RN  Safety Promotion/Fall Prevention:   activity supervised   safety round/check completed   toileting scheduled  Taken 3/22/2023 0000 by Javy Banerjee RN  Safety Promotion/Fall Prevention:   activity supervised   safety round/check completed   toileting scheduled  Taken 3/21/2023 2200 by Javy Banerjee RN  Safety Promotion/Fall Prevention:   activity supervised   safety round/check completed   toileting scheduled  Taken 3/21/2023 2000 by Javy Banerjee RN  Safety Promotion/Fall Prevention:   activity supervised   safety round/check completed   toileting scheduled  Intervention: Prevent Skin Injury  Recent Flowsheet Documentation  Taken 3/22/2023 0300 by Javy Banerjee RN  Body Position: supine  Skin Protection: adhesive use limited  Taken 3/22/2023 0000 by Javy Banerjee RN  Body Position: supine  Skin Protection: adhesive use limited  Taken 3/21/2023 2200 by Javy Banerjee RN  Body Position: supine  Skin Protection: adhesive use limited  Taken 3/21/2023 2000 by Javy Banerjee RN  Body Position: supine  Skin Protection: adhesive use limited  Intervention: Prevent and Manage VTE (Venous Thromboembolism) Risk  Recent Flowsheet Documentation  Taken 3/22/2023 0300 by Javy Banerjee RN  VTE Prevention/Management:   bilateral   sequential compression devices off  Taken 3/22/2023 0000 by Javy Banerjee RN  VTE Prevention/Management:   bilateral   sequential compression devices off  Taken 3/21/2023 2200 by Javy Banerjee RN  VTE Prevention/Management:   bilateral   sequential compression devices off  Taken 3/21/2023 2000 by Javy Banerjee RN  VTE Prevention/Management:   bilateral   sequential compression devices off  Intervention: Prevent  Infection  Recent Flowsheet Documentation  Taken 3/22/2023 0300 by Javy Banerjee RN  Infection Prevention: environmental surveillance performed  Taken 3/22/2023 0000 by Javy Banerjee RN  Infection Prevention: environmental surveillance performed  Taken 3/21/2023 2200 by Javy Banerjee RN  Infection Prevention: environmental surveillance performed  Taken 3/21/2023 2000 by Javy Banerjee RN  Infection Prevention: environmental surveillance performed  Goal: Optimal Comfort and Wellbeing  Intervention: Monitor Pain and Promote Comfort  Recent Flowsheet Documentation  Taken 3/22/2023 0300 by Javy Banerjee RN  Pain Management Interventions: quiet environment facilitated  Taken 3/22/2023 0000 by Javy Banerjee RN  Pain Management Interventions: quiet environment facilitated  Taken 3/21/2023 2200 by Javy Bnaerjee RN  Pain Management Interventions: quiet environment facilitated  Taken 3/21/2023 2000 by Javy Banerjee RN  Pain Management Interventions: quiet environment facilitated  Intervention: Provide Person-Centered Care  Recent Flowsheet Documentation  Taken 3/22/2023 0300 by Javy Banerjee RN  Trust Relationship/Rapport: care explained  Taken 3/22/2023 0000 by Javy Banerjee RN  Trust Relationship/Rapport: care explained  Taken 3/21/2023 2200 by Javy Banerjee RN  Trust Relationship/Rapport: care explained  Taken 3/21/2023 2000 by Javy Banerjee RN  Trust Relationship/Rapport: care explained     Flores in place,  voids well, rested throughout the night, await transfer to , will continue to monitor for changes.

## 2023-03-22 NOTE — DISCHARGE SUMMARY
[facility] Medicine Services  DISCHARGE SUMMARY    Patient Name: Jackie Cordova  : 1945  MRN: [id number]    Date of Admission: 3/21/2023 11:49 AM  Date of Discharge:  3/24/2023 0100  Primary Care Physician: Whitley Treadwell PA-C    Consults     Date and Time Order Name Status Description    3/21/2023  5:17 PM Inpatient Orthopedic Surgery Consult Completed           Hospital Course     Presenting Problem:   Generalized weakness [R53.1]    Active Hospital Problems    Diagnosis  POA   • **Fall [W19.XXXA]  Yes   • Periprosthetic fracture of hip [M97.8XXA, Z96.649]  Not Applicable   • Generalized weakness [R53.1]  Yes   • BLAYNE (acute kidney injury) (HCC) [N17.9]  Yes   • Rhabdomyolysis [M62.82]  Yes   • Leukocytosis [D72.829]  Yes   • Arthritis [M19.90]  Yes   • Hyperlipidemia [E78.5]  Yes   • Chronic obstructive pulmonary disease (HCC) [J44.9]  Yes      Resolved Hospital Problems   No resolved problems to display.          Hospital Course:    78 y/o female with PMH of HLD who presented to ED after a fall at home found to have comminuted/displaced periprosthetic femur fracture along with mild rhabdomyolysis and mild BLAYNE.    Mechanical fall.  Right comminuted/displaced periprosthetic femur fracture  --My colleague spoke with Dr. Ramos who was on call for ortho. Given extent of fracture he recommended transfer to higher level of care for repair. My colleague also spoke with Dr. Hemphill who agreed to accept in transfer as well as Dr. Nieto from medicine who will consult.  --Immobilize RLE. Bed rest.  --Flores in place  --continue pain meds    Mild BLAYNE  Rhabdomyolysis  -- Cr better, back to baseline this am,    --Hold statin, mobic.     Leukocytosis-- resolved wbc 8.8 today  --Likely due to stress response/volume depletion from above. U/A, CXR neg. Repeat labs show improvement this am    HLD  -- holding statin as noted above      Discharge Follow Up Recommendations for outpatient  labs/diagnostics:      Day of Discharge     HPI: Doing okay this evening, denies any pain currently. Resting comfortably in bed.  No concerns or complaints at this time.    ROS:  Gen- No fevers, chills  CV- No chest pain, palpitations  Resp- No cough, dyspnea  GI- No N/V/D, abd pain    Vitals:    03/23/23 2310   BP: 137/80   Pulse: 77   Resp: 17   Temp: 99.2 °F (37.3 °C)   SpO2: 94%         Physical Exam:  Constitutional: No acute distress, awake, alert  HENT: NCAT, mucous membranes moist  Respiratory: Clear to auscultation bilaterally, respiratory effort normal   Cardiovascular: RRR, no murmurs, rubs, or gallops  Gastrointestinal: Positive bowel sounds, soft, nontender, nondistended  Musculoskeletal: mild BLE edema, + pedal pulses  Psychiatric: Appropriate affect, cooperative  Neurologic: Oriented x 3, strength symmetric in all extremities, Cranial Nerves grossly intact to confrontation, speech clear  Skin: No rashes      Pertinent  and/or Most Recent Results     LAB RESULTS:      Lab 03/23/23  0437 03/22/23  0705 03/21/23  1157   WBC 8.88 12.88* 18.79*   HEMOGLOBIN 8.2* 8.9* 12.0   HEMATOCRIT 25.4* 27.0* 36.9   PLATELETS 263 293 423   NEUTROS ABS 5.95  --  16.37*   IMMATURE GRANS (ABS) 0.04  --  0.09*   LYMPHS ABS 1.73  --  1.04   MONOS ABS 0.95*  --  1.25*   EOS ABS 0.18  --  0.02   MCV 92.7 92.5 92.7         Lab 03/23/23  0437 03/22/23  0431 03/21/23  1157   SODIUM 139 140 141   POTASSIUM 3.8 3.8 4.7   CHLORIDE 107 108* 104   CO2 21.0* 21.0* 22.0   ANION GAP 11.0 11.0 15.0   BUN 24* 47* 62*   CREATININE 0.58 0.81 1.36*   EGFR 93.9 75.3 40.5*   GLUCOSE 86 89 165*   CALCIUM 8.5* 8.9 10.3   MAGNESIUM  --   --  2.5*         Lab 03/23/23  0437 03/21/23  1157   TOTAL PROTEIN 4.7* 7.5   ALBUMIN 3.0* 4.6   GLOBULIN 1.7 2.9   ALT (SGPT) 17 17   AST (SGOT) 30 36*   BILIRUBIN 0.4 0.7   ALK PHOS 62 98         Lab 03/21/23  1157   HSTROP T 22*                 Brief Urine Lab Results  (Last result in the past 365 days)       Color   Clarity   Blood   Leuk Est   Nitrite   Protein   CREAT   Urine HCG        03/21/23 1231 Dark Yellow   Clear   Negative   Negative   Negative   Trace               Microbiology Results (last 10 days)     ** No results found for the last 240 hours. **          XR Femur 2 View Right    Result Date: 3/21/2023  XR FEMUR 2 VW RIGHT, XR KNEE 1 OR 2 VW RIGHT Date of Exam: 3/21/2023 1:53 PM EDT Indication: fall, pain. Comparison: None available. Findings: There is an acute displaced and comminuted fracture along the distal femoral shaft that extends to the arthroplasty along the distal femur. The hip and knee joints are congruent. A total hip arthroplasty appears intact. There is soft tissue swelling along the knee.     Impression: Acute displaced and comminuted fracture along distal femoral shaft, which extends to distal femoral arthroplasty. Electronically Signed: Cristhian Harmon  3/21/2023 2:40 PM EDT  Workstation ID: [id number]    XR Knee 1 or 2 View Right    Result Date: 3/21/2023  XR FEMUR 2 VW RIGHT, XR KNEE 1 OR 2 VW RIGHT Date of Exam: 3/21/2023 1:53 PM EDT Indication: fall, pain. Comparison: None available. Findings: There is an acute displaced and comminuted fracture along the distal femoral shaft that extends to the arthroplasty along the distal femur. The hip and knee joints are congruent. A total hip arthroplasty appears intact. There is soft tissue swelling along the knee.     Impression: Acute displaced and comminuted fracture along distal femoral shaft, which extends to distal femoral arthroplasty. Electronically Signed: Cristhian Harmon  3/21/2023 2:40 PM EDT  Workstation ID: [id number]    CT Head Without Contrast    Result Date: 3/21/2023  CT HEAD WO CONTRAST Date of Exam: 3/21/2023 2:40 PM EDT Indication: fall w/ generalized weakness and tremor. Comparison: None available. Technique: Axial CT images were obtained of the head without contrast administration.  Reconstructed coronal and sagittal images  were also obtained. Automated exposure control and iterative construction methods were used. Findings: There is mild generalized parenchymal volume loss. There are patchy and confluent areas of low-attenuation within the periventricular and subcortical white matter both cerebral hemispheres compatible changes of chronic small vessel ischemic disease. There is no evidence of acute infarct. No abnormal extra-axial fluid collections are identified. There is no mass effect or hydrocephalus.  No evidence of acute skull fracture. Visualized paranasal sinuses and mastoid air cells are clear.  Globes and orbits are within normal limits.     Impression: 1. No acute intracranial abnormality. 2. Generalized parenchymal volume loss and changes of chronic small vessel ischemic disease. Electronically Signed: Eduardo Landrum  3/21/2023 3:15 PM EDT  Workstation ID: [id number]    XR Chest 1 View    Result Date: 3/21/2023  XR CHEST 1 VW Date of Exam: 3/21/2023 12:02 PM EDT Indication: Weak/Dizzy/AMS triage protocol. Comparison: None available. Findings: Heart size and vessels are within normal limits. Lungs are clear bilaterally. No pleural effusion or pneumothorax. There are severe degenerative changes of both shoulders.     Impression: 1. No acute cardiopulmonary disease. Electronically Signed: Eduardo Landrum  3/21/2023 12:57 PM EDT  Workstation ID: [id number]    XR Pelvis 1 or 2 View    Result Date: 3/21/2023  XR PELVIS 1 OR 2 VW Date of Exam: 3/21/2023 1:54 PM EDT Indication: fall, pain. Comparison: None available. Findings: There is diffuse osteopenia. Patient status post bilateral total hip arthroplasty. Distal portions of the femoral prosthetic components are not included within the field-of-view on this exam No fracture or dislocation identified. Soft tissues are unremarkable.     Impression: 1. Osteopenia 2. Status post bilateral total hip arthroplasty. No acute bony abnormality or hardware complication Electronically Signed:  Eduardo Landrum  3/21/2023 2:44 PM EDT  Workstation ID: [id number]                  Plan for Follow-up of Pending Labs/Results:    Discharge Details        Discharge Medications      New Medications      Instructions Start Date   acetaminophen 325 MG tablet  Commonly known as: TYLENOL   650 mg, Oral, Every 4 Hours PRN      HYDROcodone-acetaminophen 5-325 MG per tablet  Commonly known as: NORCO   1 tablet, Oral, Every 4 Hours PRN         Continue These Medications      Instructions Start Date   albuterol sulfate  (90 Base) MCG/ACT inhaler  Commonly known as: PROVENTIL HFA;VENTOLIN HFA;PROAIR HFA   2 puffs, Inhalation, Every 4 Hours PRN      Fluticasone Furoate-Vilanterol 100-25 MCG/INH inhaler  Commonly known as: Breo Ellipta   1 puff, Inhalation, Daily - RT         Stop These Medications    celecoxib 200 MG capsule  Commonly known as: CeleBREX     simvastatin 40 MG tablet  Commonly known as: ZOCOR     TYLENOL PM EXTRA STRENGTH PO            No Known Allergies      Discharge Disposition:  Home or Self Care    Diet:  Hospital:  Diet Order   Procedures   • Diet: Regular/House Diet; Texture: Regular Texture (IDDSI 7); Fluid Consistency: Thin (IDDSI 0)       Activity:      Restrictions or Other Recommendations:       CODE STATUS:    Code Status and Medical Interventions:   Ordered at: 03/21/23 1337     Code Status (Patient has no pulse and is not breathing):    CPR (Attempt to Resuscitate)     Medical Interventions (Patient has pulse or is breathing):    Full Support       Future Appointments   Date Time Provider Department Center   7/31/2023  9:30 AM Whitley Treadwell PA-C [facility]                 MIGUELINA Hester  03/24/23  Electronically signed by MIGUELINA Hester, 03/24/23, 1:09 AM EDT.       Time Spent on Discharge:  I spent 25 minutes on this discharge activity which included: face-to-face encounter with the patient, reviewing the data in the system, coordination of the care with the  nursing staff as well as consultants, documentation, and entering orders.

## 2023-03-22 NOTE — CONSULTS
[facility]  [address]  [place] [address]  [phone]       Orthopedic Consult    Patient: Jackie Cordova    Date of Admission: 3/21/2023 11:49 AM    YOB: 1945    Medical Record Number: [id number]    Attending Physician: Miriam Reyes MD    Consulting Physician: Cristhian Ramos Jr, MD    Chief Complaint: Generalized weakness [R53.1]    History of Present Illness: 77 y.o. female admitted to [facility] with Generalized weakness [R53.1].  She was incidentally noted to have knee pain after transfer to the floor.  Dr. Degroot evaluated her more closely and noted distal femoral fracture adjacent to her total knee arthroplasty for which I was consulted.     No Known Allergies     Home Medications:  Medications Prior to Admission   Medication Sig Dispense Refill Last Dose   • albuterol sulfate  (90 Base) MCG/ACT inhaler Inhale 2 puffs Every 4 (Four) Hours As Needed for Wheezing. 18 g 1    • celecoxib (CeleBREX) 200 MG capsule TAKE ONE CAPSULE BY MOUTH DAILY 90 capsule 1    • diphenhydrAMINE-APAP, sleep, (TYLENOL PM EXTRA STRENGTH PO) Take  by mouth.      • Fluticasone Furoate-Vilanterol (Breo Ellipta) 100-25 MCG/INH inhaler Inhale 1 puff Daily. 28 each 5    • simvastatin (ZOCOR) 40 MG tablet TAKE ONE TABLET BY MOUTH ONCE NIGHTLY 90 tablet 1        Current Medications:  Scheduled Meds:budesonide-formoterol, 1 puff, Inhalation, BID - RT  heparin (porcine), 5,000 Units, Subcutaneous, Q8H  senna-docusate sodium, 2 tablet, Oral, BID  sodium chloride, 10 mL, Intravenous, Q12H      Continuous Infusions:sodium chloride, 125 mL/hr, Last Rate: 125 mL/hr (03/21/23 1619)      PRN Meds:.•  acetaminophen **OR** acetaminophen **OR** acetaminophen  •  albuterol  •  senna-docusate sodium **AND** polyethylene glycol **AND** bisacodyl **AND** bisacodyl  •  HYDROcodone-acetaminophen  •  melatonin  •  Morphine **AND** naloxone  •  ondansetron **OR** ondansetron  •  sodium chloride  •   sodium chloride  •  sodium chloride    Past Medical History:   Diagnosis Date   • Arthritis    • COPD (chronic obstructive pulmonary disease) (HCC)    • Hyperlipidemia    • Osteoporosis         Past Surgical History:   Procedure Laterality Date   • HIP SURGERY     • REPLACEMENT TOTAL KNEE          Social History     Occupational History   • Not on file   Tobacco Use   • Smoking status: Former     Packs/day: 0.50     Years: 40.00     Pack years: 20.00     Types: Cigarettes     Start date:      Quit date:      Years since quittin.2   • Smokeless tobacco: Never   Vaping Use   • Vaping Use: Never used   Substance and Sexual Activity   • Alcohol use: Yes     Comment: occasionally a glass of wine   • Drug use: Never   • Sexual activity: Not Currently      Social History     Social History Narrative   • Not on file        Family History   Problem Relation Age of Onset   • Arthritis Mother    • Arthritis Sister    • Diabetes Brother    • Arthritis Brother    • Breast cancer Neg Hx          Review of Systems:   HEENT: Patient denies any headaches, vision changes, change in hearing, or tinnitus, Patient denies any rhinorrhea,epistaxis, sinus pain, mouth or dental problems, sore throat or hoarseness, or dysphagia  Pulmonary: Patient denies any cough, congestion, SOA, or wheezing  Cardiovascular: Patient denies any chest pain, dyspnea, palpitations, weakness, intolerance of exercise, varicosities, swelling of extremities, known murmur  Gastrointestinal:  Patient denies nausea, vomiting, diarrhea, constipation, loss  of appetite, change in appetite, dysphagia, gas, heartburn, melena, change in bowel habits, use of laxatives or other drugs to alter the function of the gastrointestinal tract.  Genital/Urinary: Patient denies dysuria, change in color of urine, change in frequency of urination, pain with urgency, incontinence, retention, or nocturia.  Musculoskeletal: Patient denies increased warmth; redness; or swelling  of joints; limitation of function; deformity; crepitation: pain in a joint or an extremity, the neck, or the back, especially with movement.  Neurological: Patient denies dizziness, tremor, ataxia, difficulty in speaking, change in speech, paresthesia, loss of sensation, seizures, syncope, changes in memory.  Endocrine system: Patient denies tremors, palpitations, intolerance of heat or cold, polyuria, polydipsia, polyphagia, diaphoresis, exophthalmos, or goiter.  Psychological: Patient denies thoughts/plans or harming self or other; depression,  insomnia, night terrors, minesh, memory loss, disorientation.  Skin: Patient denies any bruising, rashes, discoloration, pruritus, wounds, ulcers, decubiti, changes in the hair or nails  Hematopoietic: Patient denies history of spontaneous or excessive bleeding, epistaxis, hematuria, melena, fatigue, enlarged or tender lymph nodes, pallor, history of anemia.    Physical Exam: 77 y.o. female  General Appearance:    Alert, cooperative, in no acute distress                   Vitals:    03/21/23 2104 03/21/23 2357 03/22/23 0107 03/22/23 0452   BP: 121/57 91/52 109/58 98/63   BP Location: Right arm Right arm  Right arm   Patient Position: Lying Lying  Lying   Pulse: 105 101  96   Resp: 18 18  18   Temp: 99.2 °F (37.3 °C) 98.9 °F (37.2 °C)  99.8 °F (37.7 °C)   TempSrc: Oral Oral  Axillary   SpO2: 95% 91%  93%   Weight:       Height:            Head:    Normocephalic, without obvious abnormality, atraumatic   Eyes:            Lids and lashes normal, conjunctivae and sclerae normal, no   icterus, no pallor, corneas clear, PERRLA   Ears:    Ears appear intact with no abnormalities noted   Throat:   No oral lesions, no thrush, oral mucosa moist   Back:     No C-T-L spine tenderness   Lungs:     Clear to auscultation,respirations regular, even and                  unlabored   Chest Wall:    No abnormalities observed   Abdomen:     Normal bowel sounds, no masses, no organomegaly, soft         non-tender, non-distended, no guarding, no rebound                tenderness   Extremities:  Right knee.  Distally grossly neurovascularly intact.   Pulses:   Pulses palpable and equal bilaterally   Skin:   No bleeding, bruising or rash   Lymph nodes:   No palpable adenopathy   Neurologic:   Cranial nerves 2 - 12 grossly intact, sensation intact, DTR       present and equal bilaterally           Diagnostic Tests:    Admission on 03/21/2023   Component Date Value Ref Range Status   • QT Interval 03/21/2023 346  ms Preliminary   • QTC Interval 03/21/2023 441  ms Preliminary   • Glucose 03/21/2023 165 (H)  65 - 99 mg/dL Final   • BUN 03/21/2023 62 (H)  8 - 23 mg/dL Final   • Creatinine 03/21/2023 1.36 (H)  0.57 - 1.00 mg/dL Final   • Sodium 03/21/2023 141  136 - 145 mmol/L Final   • Potassium 03/21/2023 4.7  3.5 - 5.2 mmol/L Final   • Chloride 03/21/2023 104  98 - 107 mmol/L Final   • CO2 03/21/2023 22.0  22.0 - 29.0 mmol/L Final   • Calcium 03/21/2023 10.3  8.6 - 10.5 mg/dL Final   • Total Protein 03/21/2023 7.5  6.0 - 8.5 g/dL Final   • Albumin 03/21/2023 4.6  3.5 - 5.2 g/dL Final   • ALT (SGPT) 03/21/2023 17  1 - 33 U/L Final   • AST (SGOT) 03/21/2023 36 (H)  1 - 32 U/L Final   • Alkaline Phosphatase 03/21/2023 98  39 - 117 U/L Final   • Total Bilirubin 03/21/2023 0.7  0.0 - 1.2 mg/dL Final   • Globulin 03/21/2023 2.9  gm/dL Final    Calculated Result   • A/G Ratio 03/21/2023 1.6  g/dL Final   • BUN/Creatinine Ratio 03/21/2023 45.6 (H)  7.0 - 25.0 Final   • Anion Gap 03/21/2023 15.0  5.0 - 15.0 mmol/L Final   • eGFR 03/21/2023 40.5 (L)  >60.0 mL/min/1.73 Final   • HS Troponin T 03/21/2023 22 (H)  <10 ng/L Final   • Magnesium 03/21/2023 2.5 (H)  1.6 - 2.4 mg/dL Final   • Color, UA 03/21/2023 Dark Yellow (A)  Yellow, Straw Final   • Appearance, UA 03/21/2023 Clear  Clear Final   • pH, UA 03/21/2023 <=5.0  5.0 - 8.0 Final   • Specific Gravity, UA 03/21/2023 1.025  1.001 - 1.030 Final   • Glucose, UA 03/21/2023  Negative  Negative Final   • Ketones, UA 03/21/2023 Trace (A)  Negative Final   • Bilirubin, UA 03/21/2023 Negative  Negative Final   • Blood, UA 03/21/2023 Negative  Negative Final   • Protein, UA 03/21/2023 Trace (A)  Negative Final   • Leuk Esterase, UA 03/21/2023 Negative  Negative Final   • Nitrite, UA 03/21/2023 Negative  Negative Final   • Urobilinogen, UA 03/21/2023 1.0 E.U./dL  0.2 - 1.0 E.U./dL Final   • Extra Tube 03/21/2023 Hold for add-ons.   Final    Auto resulted.   • Extra Tube 03/21/2023 hold for add-on   Final    Auto resulted   • Extra Tube 03/21/2023 Hold for add-ons.   Final    Auto resulted.   • Extra Tube 03/21/2023 Hold for add-ons.   Final    Auto resulted.   • Extra Tube 03/21/2023 Hold for add-ons.   Final    Auto resulted   • WBC 03/21/2023 18.79 (H)  3.40 - 10.80 10*3/mm3 Final   • RBC 03/21/2023 3.98  3.77 - 5.28 10*6/mm3 Final   • Hemoglobin 03/21/2023 12.0  12.0 - 15.9 g/dL Final   • Hematocrit 03/21/2023 36.9  34.0 - 46.6 % Final   • MCV 03/21/2023 92.7  79.0 - 97.0 fL Final   • MCH 03/21/2023 30.2  26.6 - 33.0 pg Final   • MCHC 03/21/2023 32.5  31.5 - 35.7 g/dL Final   • RDW 03/21/2023 13.5  12.3 - 15.4 % Final   • RDW-SD 03/21/2023 46.2  37.0 - 54.0 fl Final   • MPV 03/21/2023 9.8  6.0 - 12.0 fL Final   • Platelets 03/21/2023 423  140 - 450 10*3/mm3 Final   • Neutrophil % 03/21/2023 87.1 (H)  42.7 - 76.0 % Final   • Lymphocyte % 03/21/2023 5.5 (L)  19.6 - 45.3 % Final   • Monocyte % 03/21/2023 6.7  5.0 - 12.0 % Final   • Eosinophil % 03/21/2023 0.1 (L)  0.3 - 6.2 % Final   • Basophil % 03/21/2023 0.1  0.0 - 1.5 % Final   • Immature Grans % 03/21/2023 0.5  0.0 - 0.5 % Final   • Neutrophils, Absolute 03/21/2023 16.37 (H)  1.70 - 7.00 10*3/mm3 Final   • Lymphocytes, Absolute 03/21/2023 1.04  0.70 - 3.10 10*3/mm3 Final   • Monocytes, Absolute 03/21/2023 1.25 (H)  0.10 - 0.90 10*3/mm3 Final   • Eosinophils, Absolute 03/21/2023 0.02  0.00 - 0.40 10*3/mm3 Final   • Basophils, Absolute  03/21/2023 0.02  0.00 - 0.20 10*3/mm3 Final   • Immature Grans, Absolute 03/21/2023 0.09 (H)  0.00 - 0.05 10*3/mm3 Final   • nRBC 03/21/2023 0.0  0.0 - 0.2 /100 WBC Final   • Creatine Kinase 03/21/2023 1,033 (H)  20 - 180 U/L Final   • Creatine Kinase 03/22/2023 1,078 (H)  20 - 180 U/L Final   • Glucose 03/22/2023 89  65 - 99 mg/dL Final   • BUN 03/22/2023 47 (H)  8 - 23 mg/dL Final   • Creatinine 03/22/2023 0.81  0.57 - 1.00 mg/dL Final   • Sodium 03/22/2023 140  136 - 145 mmol/L Final   • Potassium 03/22/2023 3.8  3.5 - 5.2 mmol/L Final   • Chloride 03/22/2023 108 (H)  98 - 107 mmol/L Final   • CO2 03/22/2023 21.0 (L)  22.0 - 29.0 mmol/L Final   • Calcium 03/22/2023 8.9  8.6 - 10.5 mg/dL Final   • BUN/Creatinine Ratio 03/22/2023 58.0 (H)  7.0 - 25.0 Final   • Anion Gap 03/22/2023 11.0  5.0 - 15.0 mmol/L Final   • eGFR 03/22/2023 75.3  >60.0 mL/min/1.73 Final       XR Femur 2 View Right    Result Date: 3/21/2023  Narrative: XR FEMUR 2 VW RIGHT, XR KNEE 1 OR 2 VW RIGHT Date of Exam: 3/21/2023 1:53 PM EDT Indication: fall, pain. Comparison: None available. Findings: There is an acute displaced and comminuted fracture along the distal femoral shaft that extends to the arthroplasty along the distal femur. The hip and knee joints are congruent. A total hip arthroplasty appears intact. There is soft tissue swelling along the knee.     Impression: Impression: Acute displaced and comminuted fracture along distal femoral shaft, which extends to distal femoral arthroplasty. Electronically Signed: Cristhian Harmon  3/21/2023 2:40 PM EDT  Workstation ID: [id number]    XR Knee 1 or 2 View Right    Result Date: 3/21/2023  Narrative: XR FEMUR 2 VW RIGHT, XR KNEE 1 OR 2 VW RIGHT Date of Exam: 3/21/2023 1:53 PM EDT Indication: fall, pain. Comparison: None available. Findings: There is an acute displaced and comminuted fracture along the distal femoral shaft that extends to the arthroplasty along the distal femur. The hip and knee  joints are congruent. A total hip arthroplasty appears intact. There is soft tissue swelling along the knee.     Impression: Impression: Acute displaced and comminuted fracture along distal femoral shaft, which extends to distal femoral arthroplasty. Electronically Signed: Cristhian Harmon  3/21/2023 2:40 PM EDT  Workstation ID: [id number]    CT Head Without Contrast    Result Date: 3/21/2023  Narrative: CT HEAD WO CONTRAST Date of Exam: 3/21/2023 2:40 PM EDT Indication: fall w/ generalized weakness and tremor. Comparison: None available. Technique: Axial CT images were obtained of the head without contrast administration.  Reconstructed coronal and sagittal images were also obtained. Automated exposure control and iterative construction methods were used. Findings: There is mild generalized parenchymal volume loss. There are patchy and confluent areas of low-attenuation within the periventricular and subcortical white matter both cerebral hemispheres compatible changes of chronic small vessel ischemic disease. There is no evidence of acute infarct. No abnormal extra-axial fluid collections are identified. There is no mass effect or hydrocephalus.  No evidence of acute skull fracture. Visualized paranasal sinuses and mastoid air cells are clear.  Globes and orbits are within normal limits.     Impression: Impression: 1. No acute intracranial abnormality. 2. Generalized parenchymal volume loss and changes of chronic small vessel ischemic disease. Electronically Signed: Eduardo Landrum  3/21/2023 3:15 PM EDT  Workstation ID: [id number]    XR Chest 1 View    Result Date: 3/21/2023  Narrative: XR CHEST 1 VW Date of Exam: 3/21/2023 12:02 PM EDT Indication: Weak/Dizzy/AMS triage protocol. Comparison: None available. Findings: Heart size and vessels are within normal limits. Lungs are clear bilaterally. No pleural effusion or pneumothorax. There are severe degenerative changes of both shoulders.     Impression: Impression: 1. No  acute cardiopulmonary disease. Electronically Signed: Eduardo Landrum  3/21/2023 12:57 PM EDT  Workstation ID: [id number]    XR Pelvis 1 or 2 View    Result Date: 3/21/2023  Narrative: XR PELVIS 1 OR 2 VW Date of Exam: 3/21/2023 1:54 PM EDT Indication: fall, pain. Comparison: None available. Findings: There is diffuse osteopenia. Patient status post bilateral total hip arthroplasty. Distal portions of the femoral prosthetic components are not included within the field-of-view on this exam No fracture or dislocation identified. Soft tissues are unremarkable.     Impression: Impression: 1. Osteopenia 2. Status post bilateral total hip arthroplasty. No acute bony abnormality or hardware complication Electronically Signed: Eduardo Landrum  3/21/2023 2:44 PM EDT  Workstation ID: [id number]    CT Chest Low Dose Cancer Screening WO    Result Date: 2/24/2023  Narrative: CT CHEST LOW DOSE CANCER SCREENING WO Date of Exam: 2/24/2023 8:27 AM EST Indication: Lung cancer screening, < 20 pk-yr smoking history (Age >= 50y). Comparison: None available. Technique: Low dose CT imaging of the chest was performed without intravenous contrast enhancement.  Automated exposure control and iterative reconstruction methods were used. Findings:  The central tracheobronchial tree is clear. There are mild chronic pulmonary changes. No discrete pulmonary nodule is identified. There is no focal consolidation. There is no pleural effusion. The heart size appears normal, with evidence of mild calcified coronary artery disease. The great vessels are normal in caliber. No abnormally enlarged lymph nodes are identified. Partial evaluation of the upper abdomen is unremarkable. No aggressive osseous lesions are identified.     Impression: Impression: 1.No evidence of lung cancer. 2.No acute cardiopulmonary process. Recommendation: Continue annual screening with LDCT Lung Rads Assessment: Lung-RADS L1 - Negative, <1% chance of malignancy. Electronically Signed:  Cristhian Harmon  2/24/2023 11:20 AM EST  Workstation ID: [id number]        Assessment:  Patient Active Problem List   Diagnosis   • Osteoporosis without current pathological fracture   • Chronic obstructive pulmonary disease (HCC)   • Kidney cysts   • Hyperlipidemia   • Arthritis   • Microscopic hematuria   • Hypercalcemia   • Gastroesophageal reflux disease without esophagitis   • Generalized weakness   • Fall   • BLAYNE (acute kidney injury) (HCC)   • Rhabdomyolysis   • Leukocytosis       77-year-old female with right periprosthetic distal femur fracture.    Plan: Plan for nonweightbearing.  Agree with plan for transfer to  for recon/trauma specialist.            Date: 3/22/2023    Cristhian Ramos Jr, MD

## 2023-03-23 VITALS
BODY MASS INDEX: 27.88 KG/M2 | RESPIRATION RATE: 17 BRPM | TEMPERATURE: 99.2 F | DIASTOLIC BLOOD PRESSURE: 80 MMHG | HEART RATE: 77 BPM | SYSTOLIC BLOOD PRESSURE: 137 MMHG | HEIGHT: 62 IN | OXYGEN SATURATION: 94 % | WEIGHT: 151.5 LBS

## 2023-03-23 LAB
ALBUMIN SERPL-MCNC: 3 G/DL (ref 3.5–5.2)
ALBUMIN/GLOB SERPL: 1.8 G/DL
ALP SERPL-CCNC: 62 U/L (ref 39–117)
ALT SERPL W P-5'-P-CCNC: 17 U/L (ref 1–33)
ANION GAP SERPL CALCULATED.3IONS-SCNC: 11 MMOL/L (ref 5–15)
AST SERPL-CCNC: 30 U/L (ref 1–32)
BASOPHILS # BLD AUTO: 0.03 10*3/MM3 (ref 0–0.2)
BASOPHILS NFR BLD AUTO: 0.3 % (ref 0–1.5)
BILIRUB SERPL-MCNC: 0.4 MG/DL (ref 0–1.2)
BUN SERPL-MCNC: 24 MG/DL (ref 8–23)
BUN/CREAT SERPL: 41.4 (ref 7–25)
CALCIUM SPEC-SCNC: 8.5 MG/DL (ref 8.6–10.5)
CHLORIDE SERPL-SCNC: 107 MMOL/L (ref 98–107)
CK SERPL-CCNC: 448 U/L (ref 20–180)
CO2 SERPL-SCNC: 21 MMOL/L (ref 22–29)
CREAT SERPL-MCNC: 0.58 MG/DL (ref 0.57–1)
DEPRECATED RDW RBC AUTO: 47.3 FL (ref 37–54)
EGFRCR SERPLBLD CKD-EPI 2021: 93.9 ML/MIN/1.73
EOSINOPHIL # BLD AUTO: 0.18 10*3/MM3 (ref 0–0.4)
EOSINOPHIL NFR BLD AUTO: 2 % (ref 0.3–6.2)
ERYTHROCYTE [DISTWIDTH] IN BLOOD BY AUTOMATED COUNT: 14 % (ref 12.3–15.4)
GLOBULIN UR ELPH-MCNC: 1.7 GM/DL
GLUCOSE SERPL-MCNC: 86 MG/DL (ref 65–99)
HCT VFR BLD AUTO: 25.4 % (ref 34–46.6)
HGB BLD-MCNC: 8.2 G/DL (ref 12–15.9)
IMM GRANULOCYTES # BLD AUTO: 0.04 10*3/MM3 (ref 0–0.05)
IMM GRANULOCYTES NFR BLD AUTO: 0.5 % (ref 0–0.5)
LYMPHOCYTES # BLD AUTO: 1.73 10*3/MM3 (ref 0.7–3.1)
LYMPHOCYTES NFR BLD AUTO: 19.5 % (ref 19.6–45.3)
MCH RBC QN AUTO: 29.9 PG (ref 26.6–33)
MCHC RBC AUTO-ENTMCNC: 32.3 G/DL (ref 31.5–35.7)
MCV RBC AUTO: 92.7 FL (ref 79–97)
MONOCYTES # BLD AUTO: 0.95 10*3/MM3 (ref 0.1–0.9)
MONOCYTES NFR BLD AUTO: 10.7 % (ref 5–12)
NEUTROPHILS NFR BLD AUTO: 5.95 10*3/MM3 (ref 1.7–7)
NEUTROPHILS NFR BLD AUTO: 67 % (ref 42.7–76)
NRBC BLD AUTO-RTO: 0 /100 WBC (ref 0–0.2)
PLATELET # BLD AUTO: 263 10*3/MM3 (ref 140–450)
PMV BLD AUTO: 10 FL (ref 6–12)
POTASSIUM SERPL-SCNC: 3.8 MMOL/L (ref 3.5–5.2)
PROT SERPL-MCNC: 4.7 G/DL (ref 6–8.5)
RBC # BLD AUTO: 2.74 10*6/MM3 (ref 3.77–5.28)
SODIUM SERPL-SCNC: 139 MMOL/L (ref 136–145)
WBC NRBC COR # BLD: 8.88 10*3/MM3 (ref 3.4–10.8)

## 2023-03-23 PROCEDURE — 94799 UNLISTED PULMONARY SVC/PX: CPT

## 2023-03-23 PROCEDURE — 63710000001 ONDANSETRON PER 8 MG: Performed by: INTERNAL MEDICINE

## 2023-03-23 PROCEDURE — 94664 DEMO&/EVAL PT USE INHALER: CPT

## 2023-03-23 PROCEDURE — 85025 COMPLETE CBC W/AUTO DIFF WBC: CPT | Performed by: INTERNAL MEDICINE

## 2023-03-23 PROCEDURE — 25010000002 HEPARIN (PORCINE) PER 1000 UNITS: Performed by: INTERNAL MEDICINE

## 2023-03-23 PROCEDURE — 99231 SBSQ HOSP IP/OBS SF/LOW 25: CPT | Performed by: INTERNAL MEDICINE

## 2023-03-23 PROCEDURE — 80053 COMPREHEN METABOLIC PANEL: CPT | Performed by: INTERNAL MEDICINE

## 2023-03-23 PROCEDURE — 82550 ASSAY OF CK (CPK): CPT | Performed by: INTERNAL MEDICINE

## 2023-03-23 RX ADMIN — BUDESONIDE AND FORMOTEROL FUMARATE DIHYDRATE 1 PUFF: 160; 4.5 AEROSOL RESPIRATORY (INHALATION) at 08:54

## 2023-03-23 RX ADMIN — HEPARIN SODIUM 5000 UNITS: 5000 INJECTION INTRAVENOUS; SUBCUTANEOUS at 14:37

## 2023-03-23 RX ADMIN — BUDESONIDE AND FORMOTEROL FUMARATE DIHYDRATE 1 PUFF: 160; 4.5 AEROSOL RESPIRATORY (INHALATION) at 20:22

## 2023-03-23 RX ADMIN — BISACODYL 5 MG: 5 TABLET, COATED ORAL at 09:24

## 2023-03-23 RX ADMIN — HEPARIN SODIUM 5000 UNITS: 5000 INJECTION INTRAVENOUS; SUBCUTANEOUS at 05:52

## 2023-03-23 RX ADMIN — HYDROCODONE BITARTRATE AND ACETAMINOPHEN 1 TABLET: 5; 325 TABLET ORAL at 09:27

## 2023-03-23 RX ADMIN — ONDANSETRON HYDROCHLORIDE 4 MG: 4 TABLET, FILM COATED ORAL at 14:37

## 2023-03-23 RX ADMIN — SENNOSIDES AND DOCUSATE SODIUM 2 TABLET: 8.6; 5 TABLET ORAL at 09:24

## 2023-03-23 RX ADMIN — HEPARIN SODIUM 5000 UNITS: 5000 INJECTION INTRAVENOUS; SUBCUTANEOUS at 20:11

## 2023-03-23 NOTE — CASE MANAGEMENT/SOCIAL WORK
Continued Stay Note  [facility]     Patient Name: Jackie Cordova  MRN: [id number]  Today's Date: 3/23/2023    Admit Date: 3/21/2023    Plan: [facility]   Discharge Plan     Row Name 03/23/23 1617       Plan    Plan [facility]    Plan Comments Case mgt f/u. I spoke with  bed placement [phone]. still not beds available for transfer. Discussed with Ms Teresa. She lives alone in her own home and was independent with her own ADLs. Her sister lives nearby. We discussed need for inpt rehab after her surgery. Case mgt will follow for any needs while here. [facility] on will call for transfer to [facility]               Discharge Codes    No documentation.               Expected Discharge Date and Time     Expected Discharge Date Expected Discharge Time    Mar 23, 2023             Sonja C Kellerman, RN

## 2023-03-23 NOTE — PROGRESS NOTES
[facility] Medicine Services  PROGRESS NOTE    Patient Name: Jackie Cordova  : 1945  MRN: [id number]    Date of Admission: 3/21/2023  Primary Care Physician: Whitley Treadwell PA-C    Subjective   Subjective     CC:  Periprosthetic hip fracture    HPI:  Doing okay this am, just wants to be moved as she is leaning leftward.     ROS:  Gen- No fevers, chills  CV- No chest pain, palpitations  Resp- No cough, dyspnea  GI- No N/V/D, abd pain        Objective   Objective     Vital Signs:   Temp:  [98 °F (36.7 °C)-98.9 °F (37.2 °C)] 98.9 °F (37.2 °C)  Heart Rate:  [63-87] 78  Resp:  [16-20] 17  BP: (101-130)/(49-79) 101/63     Physical Exam:  Constitutional: No acute distress, awake, alert  HENT: NCAT, mucous membranes moist  Respiratory: Clear to auscultation bilaterally, respiratory effort normal   Cardiovascular: RRR, no murmurs, rubs, or gallops  Gastrointestinal: Positive bowel sounds, soft, nontender, nondistended  Musculoskeletal: No bilateral ankle edema  Psychiatric: Appropriate affect, cooperative  Neurologic: Oriented x 3, strength symmetric in all extremities, Cranial Nerves grossly intact to confrontation, speech clear  Skin: No rashes  Results Reviewed:  LAB RESULTS:      Lab 23  0437 23  0705 23  1157   WBC 8.88 12.88* 18.79*   HEMOGLOBIN 8.2* 8.9* 12.0   HEMATOCRIT 25.4* 27.0* 36.9   PLATELETS 263 293 423   NEUTROS ABS 5.95  --  16.37*   IMMATURE GRANS (ABS) 0.04  --  0.09*   LYMPHS ABS 1.73  --  1.04   MONOS ABS 0.95*  --  1.25*   EOS ABS 0.18  --  0.02   MCV 92.7 92.5 92.7         Lab 23  0437 23  0431 23  1157   SODIUM 139 140 141   POTASSIUM 3.8 3.8 4.7   CHLORIDE 107 108* 104   CO2 21.0* 21.0* 22.0   ANION GAP 11.0 11.0 15.0   BUN 24* 47* 62*   CREATININE 0.58 0.81 1.36*   EGFR 93.9 75.3 40.5*   GLUCOSE 86 89 165*   CALCIUM 8.5* 8.9 10.3   MAGNESIUM  --   --  2.5*         Lab 23  0437 23  1157   TOTAL PROTEIN 4.7* 7.5    ALBUMIN 3.0* 4.6   GLOBULIN 1.7 2.9   ALT (SGPT) 17 17   AST (SGOT) 30 36*   BILIRUBIN 0.4 0.7   ALK PHOS 62 98         Lab 03/21/23  1157   HSTROP T 22*                 Brief Urine Lab Results  (Last result in the past 365 days)      Color   Clarity   Blood   Leuk Est   Nitrite   Protein   CREAT   Urine HCG        03/21/23 1231 Dark Yellow   Clear   Negative   Negative   Negative   Trace                 Microbiology Results Abnormal     None          XR Femur 2 View Right    Result Date: 3/21/2023  XR FEMUR 2 VW RIGHT, XR KNEE 1 OR 2 VW RIGHT Date of Exam: 3/21/2023 1:53 PM EDT Indication: fall, pain. Comparison: None available. Findings: There is an acute displaced and comminuted fracture along the distal femoral shaft that extends to the arthroplasty along the distal femur. The hip and knee joints are congruent. A total hip arthroplasty appears intact. There is soft tissue swelling along the knee.     Impression: Impression: Acute displaced and comminuted fracture along distal femoral shaft, which extends to distal femoral arthroplasty. Electronically Signed: Cristhian Harmon  3/21/2023 2:40 PM EDT  Workstation ID: [id number]    XR Knee 1 or 2 View Right    Result Date: 3/21/2023  XR FEMUR 2 VW RIGHT, XR KNEE 1 OR 2 VW RIGHT Date of Exam: 3/21/2023 1:53 PM EDT Indication: fall, pain. Comparison: None available. Findings: There is an acute displaced and comminuted fracture along the distal femoral shaft that extends to the arthroplasty along the distal femur. The hip and knee joints are congruent. A total hip arthroplasty appears intact. There is soft tissue swelling along the knee.     Impression: Impression: Acute displaced and comminuted fracture along distal femoral shaft, which extends to distal femoral arthroplasty. Electronically Signed: Cristhian Harmon  3/21/2023 2:40 PM EDT  Workstation ID: [id number]    CT Head Without Contrast    Result Date: 3/21/2023  CT HEAD WO CONTRAST Date of Exam: 3/21/2023 2:40 PM  EDT Indication: fall w/ generalized weakness and tremor. Comparison: None available. Technique: Axial CT images were obtained of the head without contrast administration.  Reconstructed coronal and sagittal images were also obtained. Automated exposure control and iterative construction methods were used. Findings: There is mild generalized parenchymal volume loss. There are patchy and confluent areas of low-attenuation within the periventricular and subcortical white matter both cerebral hemispheres compatible changes of chronic small vessel ischemic disease. There is no evidence of acute infarct. No abnormal extra-axial fluid collections are identified. There is no mass effect or hydrocephalus.  No evidence of acute skull fracture. Visualized paranasal sinuses and mastoid air cells are clear.  Globes and orbits are within normal limits.     Impression: Impression: 1. No acute intracranial abnormality. 2. Generalized parenchymal volume loss and changes of chronic small vessel ischemic disease. Electronically Signed: Eduardo Landrum  3/21/2023 3:15 PM EDT  Workstation ID: [id number]    XR Chest 1 View    Result Date: 3/21/2023  XR CHEST 1 VW Date of Exam: 3/21/2023 12:02 PM EDT Indication: Weak/Dizzy/AMS triage protocol. Comparison: None available. Findings: Heart size and vessels are within normal limits. Lungs are clear bilaterally. No pleural effusion or pneumothorax. There are severe degenerative changes of both shoulders.     Impression: Impression: 1. No acute cardiopulmonary disease. Electronically Signed: Eduardo Landrum  3/21/2023 12:57 PM EDT  Workstation ID: [id number]    XR Pelvis 1 or 2 View    Result Date: 3/21/2023  XR PELVIS 1 OR 2 VW Date of Exam: 3/21/2023 1:54 PM EDT Indication: fall, pain. Comparison: None available. Findings: There is diffuse osteopenia. Patient status post bilateral total hip arthroplasty. Distal portions of the femoral prosthetic components are not included within the field-of-view on  this exam No fracture or dislocation identified. Soft tissues are unremarkable.     Impression: Impression: 1. Osteopenia 2. Status post bilateral total hip arthroplasty. No acute bony abnormality or hardware complication Electronically Signed: Eduardo Landrum  3/21/2023 2:44 PM EDT  Workstation ID: [id number]          Current medications:  Scheduled Meds:budesonide-formoterol, 1 puff, Inhalation, BID - RT  heparin (porcine), 5,000 Units, Subcutaneous, Q8H  senna-docusate sodium, 2 tablet, Oral, BID  sodium chloride, 10 mL, Intravenous, Q12H      Continuous Infusions:sodium chloride, 125 mL/hr, Last Rate: 125 mL/hr (03/22/23 0808)      PRN Meds:.•  acetaminophen **OR** acetaminophen **OR** acetaminophen  •  albuterol  •  senna-docusate sodium **AND** polyethylene glycol **AND** bisacodyl **AND** bisacodyl  •  HYDROcodone-acetaminophen  •  melatonin  •  Morphine **AND** naloxone  •  ondansetron **OR** ondansetron  •  sodium chloride  •  sodium chloride  •  sodium chloride    Assessment & Plan   Assessment & Plan     Active Hospital Problems    Diagnosis  POA   • **Fall [W19.XXXA]  Yes   • Periprosthetic fracture of hip [M97.8XXA, Z96.649]  Not Applicable     Priority: High   • Generalized weakness [R53.1]  Yes   • BLAYNE (acute kidney injury) (HCC) [N17.9]  Yes   • Rhabdomyolysis [M62.82]  Yes   • Leukocytosis [D72.829]  Yes   • Arthritis [M19.90]  Yes   • Hyperlipidemia [E78.5]  Yes   • Chronic obstructive pulmonary disease (HCC) [J44.9]  Yes      Resolved Hospital Problems   No resolved problems to display.        Brief Hospital Course to date:  Jackie Cordova is a 77 y.o. female with PMH of HLD who presented to ED after a fall at home found to have comminuted/displaced periprosthetic femur fracture along with mild rhabdomyolysis and mild BLAYNE.     Mechanical fall.  Right comminuted/displaced periprosthetic femur fracture  --My colleague spoke with Dr. Ramos who was on call for ortho. Given extent of fracture he  recommended transfer to higher level of care for repair. My colleague also spoke with Dr. Hemphill who agreed to accept in transfer as well as Dr. Nieto from medicine who will consult.  --Immobilize RLE. Bed rest.  --Flores in place  --continue pain meds     Mild BLAYNE  Rhabdomyolysis  --will stop IVFs  -- Cr better, back to baseline this am,   --Hold statin, mobic.     Leukocytosis  --Likely due to stress response/volume depletion from above. U/A, CXR neg. Repeat labs show resolution today     HLD  -- holding statin as noted above      Total time spent: 15 minutes  Time spent includes time reviewing chart, face-to-face time, counseling patient/family/caregiver, ordering medications/tests/procedures, communicating with other health care professionals, documenting clinical information in the electronic health record, and coordination of care.     Expected Discharge Location and Transportation: to  when bed available  Expected Discharge   Expected Discharge Date and Time     Expected Discharge Date Expected Discharge Time    Mar 24, 2023            DVT prophylaxis:  Medical DVT prophylaxis orders are present.          CODE STATUS:   Code Status and Medical Interventions:   Ordered at: 03/21/23 1337     Code Status (Patient has no pulse and is not breathing):    CPR (Attempt to Resuscitate)     Medical Interventions (Patient has pulse or is breathing):    Full Support       Miriam Reyes MD  03/23/23

## 2023-03-23 NOTE — PLAN OF CARE
Goal Outcome Evaluation:           Progress: improving  Outcome Evaluation: VSS. Patient waiting on a bed at [facility] for transfer for further care. Flores in place due to limited mobility. Pulses good to RLE.  Tilted/repositioned q2h.  Bedrest continued.  No complaints of numbing or tingling. Patient did have some pain today, which was relieved by oral PRN medication. Zofran admin. for nausea.  Will continue to monitor and will notify MD OSBORNE.  Karen Roblero RN

## 2023-03-23 NOTE — PLAN OF CARE
Goal Outcome Evaluation:                 Problem: Adult Inpatient Plan of Care  Goal: Absence of Hospital-Acquired Illness or Injury  Intervention: Identify and Manage Fall Risk  Recent Flowsheet Documentation  Taken 3/23/2023 0400 by Javy Banerjee RN  Safety Promotion/Fall Prevention:   activity supervised   safety round/check completed   toileting scheduled  Taken 3/23/2023 0200 by Javy Banerjee RN  Safety Promotion/Fall Prevention:   activity supervised   toileting scheduled  Taken 3/23/2023 0000 by Javy Banerjee RN  Safety Promotion/Fall Prevention:   activity supervised   safety round/check completed   toileting scheduled  Taken 3/22/2023 2200 by Javy Banerjee RN  Safety Promotion/Fall Prevention:   activity supervised   safety round/check completed   toileting scheduled  Taken 3/22/2023 2000 by Javy Banerjee RN  Safety Promotion/Fall Prevention:   activity supervised   safety round/check completed   toileting scheduled  Intervention: Prevent Skin Injury  Recent Flowsheet Documentation  Taken 3/23/2023 0400 by Javy Banerjee RN  Body Position: supine  Skin Protection: adhesive use limited  Taken 3/23/2023 0200 by Javy Banerjee RN  Body Position: supine  Skin Protection: adhesive use limited  Taken 3/23/2023 0000 by Javy Banerjee RN  Body Position: supine  Skin Protection: adhesive use limited  Taken 3/22/2023 2200 by Javy Banerjee RN  Body Position: supine  Skin Protection: adhesive use limited  Taken 3/22/2023 2000 by Javy Banerjee RN  Body Position: supine  Skin Protection: adhesive use limited  Intervention: Prevent and Manage VTE (Venous Thromboembolism) Risk  Recent Flowsheet Documentation  Taken 3/22/2023 2200 by Javy Banerjee RN  VTE Prevention/Management:   bilateral   sequential compression devices off  Taken 3/22/2023 2000 by Javy Banerjee RN  VTE Prevention/Management:   bilateral   sequential compression devices off  Intervention: Prevent Infection  Recent  Flowsheet Documentation  Taken 3/23/2023 0400 by Javy Banerjee RN  Infection Prevention: environmental surveillance performed  Taken 3/23/2023 0200 by Javy Banerjee RN  Infection Prevention: environmental surveillance performed  Taken 3/23/2023 0000 by Javy Banerjee RN  Infection Prevention: environmental surveillance performed  Taken 3/22/2023 2200 by Javy Banerjee RN  Infection Prevention: environmental surveillance performed  Goal: Optimal Comfort and Wellbeing  Intervention: Monitor Pain and Promote Comfort  Recent Flowsheet Documentation  Taken 3/23/2023 0400 by Javy Banerjee RN  Pain Management Interventions: quiet environment facilitated  Taken 3/23/2023 0200 by Javy Banerjee RN  Pain Management Interventions: quiet environment facilitated  Taken 3/23/2023 0000 by Javy Banerjee RN  Pain Management Interventions: quiet environment facilitated  Taken 3/22/2023 2200 by Javy Banerjee RN  Pain Management Interventions: quiet environment facilitated  Taken 3/22/2023 2000 by Javy Banerjee RN  Pain Management Interventions: quiet environment facilitated  Intervention: Provide Person-Centered Care  Recent Flowsheet Documentation  Taken 3/23/2023 0400 by Javy Banerjee RN  Trust Relationship/Rapport: care explained  Taken 3/23/2023 0200 by Javy Banerjee RN  Trust Relationship/Rapport: care explained  Taken 3/23/2023 0000 by Javy Banerjee RN  Trust Relationship/Rapport: care explained  Taken 3/22/2023 2200 by Javy Banerjee RN  Trust Relationship/Rapport: care explained  Taken 3/22/2023 2000 by Javy Banerjee RN  Trust Relationship/Rapport: care explained     VSS, lawton in place, rested throughout the night, pain managed with PRN medications, will continue to monitor for changes.

## 2023-03-24 PROCEDURE — 99238 HOSP IP/OBS DSCHRG MGMT 30/<: CPT | Performed by: INTERNAL MEDICINE

## 2023-03-31 ENCOUNTER — DOCUMENTATION (OUTPATIENT)
Dept: FAMILY MEDICINE CLINIC | Facility: CLINIC | Age: 78
End: 2023-03-31
Payer: MEDICARE

## 2023-03-31 PROBLEM — Z91.81 HISTORY OF FALL: Status: ACTIVE | Noted: 2023-03-31

## 2023-03-31 PROBLEM — S72.91XA FEMUR FRACTURE, RIGHT: Status: ACTIVE | Noted: 2023-03-31

## 2023-03-31 RX ORDER — SIMVASTATIN 40 MG
40 TABLET ORAL NIGHTLY
COMMUNITY

## 2023-03-31 RX ORDER — OXYCODONE HYDROCHLORIDE 5 MG/1
5 TABLET ORAL EVERY 6 HOURS PRN
COMMUNITY

## 2023-03-31 RX ORDER — GABAPENTIN 100 MG/1
100 CAPSULE ORAL 3 TIMES DAILY
COMMUNITY

## 2023-03-31 RX ORDER — AMOXICILLIN 250 MG
1 CAPSULE ORAL 2 TIMES DAILY
COMMUNITY

## 2023-03-31 RX ORDER — ENOXAPARIN SODIUM 100 MG/ML
30 INJECTION SUBCUTANEOUS EVERY 12 HOURS SCHEDULED
COMMUNITY

## 2023-04-04 ENCOUNTER — TELEPHONE (OUTPATIENT)
Dept: INTERNAL MEDICINE | Facility: CLINIC | Age: 78
End: 2023-04-04

## 2023-04-04 NOTE — TELEPHONE ENCOUNTER
Provider: JOSE HUDSON   Caller: EWA CRAIN  Relationship to Patient: SISTER    Phone Number: [phone]  Reason for Call: THE CALLER WOULD LIKE TO LET THE OFFICE KNOW THAT THE PATIENT FELL AND BROKE HER RIGHT LEG. THE PATIENT WAS SEEN AS [facility] THEN TRANSFERRED TO  AND SHE IS CURRENTLY IN [facility]

## 2023-04-13 ENCOUNTER — NURSING HOME (OUTPATIENT)
Dept: INTERNAL MEDICINE | Facility: CLINIC | Age: 78
End: 2023-04-13
Payer: MEDICARE

## 2023-04-13 VITALS
BODY MASS INDEX: 30 KG/M2 | HEART RATE: 77 BPM | SYSTOLIC BLOOD PRESSURE: 99 MMHG | RESPIRATION RATE: 18 BRPM | OXYGEN SATURATION: 95 % | WEIGHT: 164 LBS | TEMPERATURE: 97.9 F | DIASTOLIC BLOOD PRESSURE: 61 MMHG

## 2023-04-13 DIAGNOSIS — Z87.891 FORMER SMOKER: ICD-10-CM

## 2023-04-13 DIAGNOSIS — M81.0 OSTEOPOROSIS WITHOUT CURRENT PATHOLOGICAL FRACTURE, UNSPECIFIED OSTEOPOROSIS TYPE: ICD-10-CM

## 2023-04-13 DIAGNOSIS — E78.5 HYPERLIPIDEMIA, UNSPECIFIED HYPERLIPIDEMIA TYPE: ICD-10-CM

## 2023-04-13 DIAGNOSIS — J44.9 CHRONIC OBSTRUCTIVE PULMONARY DISEASE, UNSPECIFIED COPD TYPE: ICD-10-CM

## 2023-04-13 DIAGNOSIS — S72.8X1A OTHER FRACTURE OF RIGHT FEMUR, INITIAL ENCOUNTER FOR CLOSED FRACTURE: Primary | ICD-10-CM

## 2023-04-13 NOTE — LETTER
Nursing Home History and Physical       Ernesto Wilson DO  [address] [place] [address] Phone: [phone]  Fax: [phone]     PATIENT NAME: Jackie Cordova                                                                          YOB: 1945           DATE OF SERVICE: 2023  FACILITY:  [facility]    CHIEF COMPLAINT:  Nursing facility admission      HISTORY OF PRESENT ILLNESS:   Patient is a 77-year-old female with history of chronic pain and hyperlipidemia transferred from [facility] after suffering a fall resulting in right periprosthetic distal femoral fracture.  Patient was initially stabilized at [facility] but transferred for surgical intervention.  Patient underwent IMN/ORIF of right distal femur.  Postoperatively, she recovered without any major complications.    On exam today, patient was resting comfortably in her bed.  She had no specific complaints or concerns.  She has been working with physical therapy.  Over the last couple of days, she has noticed increased pain and some modest swelling into the right knee.  No drainage or redness in skin.    PAST MEDICAL & SURGICAL HISTORY:   Past Medical History:   Diagnosis Date   • Arthritis    • COPD (chronic obstructive pulmonary disease)    • Hyperlipidemia    • Osteoporosis       Past Surgical History:   Procedure Laterality Date   • HIP SURGERY     • ORIF HIP FRACTURE Right    • REPLACEMENT TOTAL KNEE           MEDICATIONS:  I have reviewed and reconciled the patients medication list in the patients chart at the skilled nursing facility on 2023.      ALLERGIES:  No Known Allergies      SOCIAL HISTORY:  Social History     Socioeconomic History   • Marital status:    Tobacco Use   • Smoking status: Former     Packs/day: 0.50     Years: 40.00     Pack years: 20.00     Types: Cigarettes     Start date:      Quit date:      Years since quittin.2   • Smokeless tobacco: Never   Vaping Use   •  Vaping Use: Never used   Substance and Sexual Activity   • Alcohol use: Yes     Comment: occasionally a glass of wine   • Drug use: Never   • Sexual activity: Defer       FAMILY HISTORY:  Family History   Problem Relation Age of Onset   • Arthritis Mother    • Arthritis Sister    • Diabetes Brother    • Arthritis Brother    • Breast cancer Neg Hx         REVIEW OF SYSTEMS:  Review of Systems   Constitutional: Negative for chills, fatigue and fever.   HENT: Negative for congestion, ear pain, rhinorrhea, sinus pressure and sore throat.    Eyes: Negative for visual disturbance.   Respiratory: Negative for cough, chest tightness, shortness of breath and wheezing.    Cardiovascular: Negative for chest pain, palpitations and leg swelling.   Gastrointestinal: Negative for abdominal pain, blood in stool, constipation, diarrhea, nausea and vomiting.   Endocrine: Negative for polydipsia and polyuria.   Genitourinary: Negative for dysuria and hematuria.   Musculoskeletal: Negative for arthralgias and back pain.   Skin: Negative for rash.   Neurological: Negative for dizziness, light-headedness, numbness and headaches.   Psychiatric/Behavioral: Negative for dysphoric mood and sleep disturbance. The patient is not nervous/anxious.          PHYSICAL EXAMINATION:   VITAL SIGNS: BP 99/61   Pulse 77   Temp 97.9 °F (36.6 °C)   Resp 18   Wt 74.4 kg (164 lb)   SpO2 95%   BMI 30.00 kg/m²     Physical Exam  Vitals and nursing note reviewed.   Constitutional:       Appearance: Normal appearance. She is well-developed.      Comments: Frail elderly female in no acute distress   HENT:      Head: Normocephalic and atraumatic.      Nose: Nose normal.      Mouth/Throat:      Mouth: Mucous membranes are moist.      Pharynx: No oropharyngeal exudate.   Eyes:      General: No scleral icterus.     Conjunctiva/sclera: Conjunctivae normal.      Pupils: Pupils are equal, round, and reactive to light.   Neck:      Thyroid: No thyromegaly.    Cardiovascular:      Rate and Rhythm: Normal rate and regular rhythm.      Heart sounds: Normal heart sounds. No murmur heard.    No friction rub. No gallop.   Pulmonary:      Effort: Pulmonary effort is normal. No respiratory distress.      Breath sounds: Normal breath sounds. No wheezing.   Abdominal:      General: Bowel sounds are normal. There is no distension.      Palpations: Abdomen is soft.      Tenderness: There is no abdominal tenderness.   Musculoskeletal:         General: No deformity or signs of injury.      Cervical back: Normal range of motion and neck supple.      Comments: Right knee with well-healing surgical wounds but significant swelling without redness or drainage.   Lymphadenopathy:      Cervical: No cervical adenopathy.   Skin:     General: Skin is warm and dry.      Findings: No rash.   Neurological:      Mental Status: She is alert and oriented to person, place, and time.   Psychiatric:         Mood and Affect: Mood normal.         Behavior: Behavior normal.         RECORDS REVIEW:   Discharge Summary from [facility] 3/30/2023    ASSESSMENT   Diagnoses and all orders for this visit:    1. Other fracture of right femur, initial encounter for closed fracture (Primary)    2. Chronic obstructive pulmonary disease, unspecified COPD type    3. Hyperlipidemia, unspecified hyperlipidemia type    4. Former smoker    5. Osteoporosis without current pathological fracture, unspecified osteoporosis type        PLAN  Right periprosthetic distal femoral fracture  - Follow-up with  orthopedics as scheduled  - Pain is reasonably controlled with her current narcotic regimen.    - She does have significant swelling postoperatively which may be causing pain and limited range of motion.  Obtain CBC, CMP, ESR, CRP once to rule out signs of infection.   -Continue Lovenox for DVT prophylaxis    Chronic pain  - Continue gabapentin and oxycodone    Hyperlipidemia  - Stable on simvastatin 40 mg  daily    Osteoporosis  - Not currently on any medications.  Treatment options can be considered with PCP if appropriate.    47 minutes spent with direct patient care, review of chart, , and discussion of plans with nursing staff.    [x]  Discussed Patient in detail with nursing/staff, addressed all needs today.     [x]  Plan of Care Reviewed   [x]  PT/OT Reviewed   [x]  Order Changes  []  Discharge Plans Reviewed  [x]  Advance Directive on file with Nursing Home.   [x]  POA on file with Nursing Home.    [x]  Code Status listed and reviewed.       Ernesto Wilson DO.  4/17/2023      **Part of this note may be an electronic transcription/translation of spoken language to printed text using the Dragon Dictation System.**

## 2023-04-17 NOTE — PROGRESS NOTES
Nursing Home History and Physical       Ernesto Wilson DO  [address] [place] [address] Phone: [phone]  Fax: [phone]     PATIENT NAME: Jackie Cordova                                                                          YOB: 1945           DATE OF SERVICE: 2023  FACILITY:  [facility]    CHIEF COMPLAINT:  Nursing facility admission      HISTORY OF PRESENT ILLNESS:   Patient is a 77-year-old female with history of chronic pain and hyperlipidemia transferred from [facility] after suffering a fall resulting in right periprosthetic distal femoral fracture.  [facility] intervention.  Patient underwent IMN/ORIF of right distal femur.  Postoperatively, she recovered without any major complications.    On exam today, patient was resting comfortably in her bed.  She had no specific complaints or concerns.  She has been working with physical therapy.  Over the last couple of days, she has noticed increased pain and some modest swelling into the right knee.  No drainage or redness in skin.    PAST MEDICAL & SURGICAL HISTORY:   Past Medical History:   Diagnosis Date   • Arthritis    • COPD (chronic obstructive pulmonary disease)    • Hyperlipidemia    • Osteoporosis       Past Surgical History:   Procedure Laterality Date   • HIP SURGERY     • ORIF HIP FRACTURE Right    • REPLACEMENT TOTAL KNEE           MEDICATIONS:  I have reviewed and reconciled the patients medication list in the patients chart at the skilled nursing facility on 2023.      ALLERGIES:  No Known Allergies      SOCIAL HISTORY:  Social History     Socioeconomic History   • Marital status:    Tobacco Use   • Smoking status: Former     Packs/day: 0.50     Years: 40.00     Pack years: 20.00     Types: Cigarettes     Start date:      Quit date:      Years since quittin.2   • Smokeless tobacco: Never   Vaping Use   •  Vaping Use: Never used   Substance and Sexual Activity   • Alcohol use: Yes     Comment: occasionally a glass of wine   • Drug use: Never   • Sexual activity: Defer       FAMILY HISTORY:  Family History   Problem Relation Age of Onset   • Arthritis Mother    • Arthritis Sister    • Diabetes Brother    • Arthritis Brother    • Breast cancer Neg Hx         REVIEW OF SYSTEMS:  Review of Systems   Constitutional: Negative for chills, fatigue and fever.   HENT: Negative for congestion, ear pain, rhinorrhea, sinus pressure and sore throat.    Eyes: Negative for visual disturbance.   Respiratory: Negative for cough, chest tightness, shortness of breath and wheezing.    Cardiovascular: Negative for chest pain, palpitations and leg swelling.   Gastrointestinal: Negative for abdominal pain, blood in stool, constipation, diarrhea, nausea and vomiting.   Endocrine: Negative for polydipsia and polyuria.   Genitourinary: Negative for dysuria and hematuria.   Musculoskeletal: Negative for arthralgias and back pain.   Skin: Negative for rash.   Neurological: Negative for dizziness, light-headedness, numbness and headaches.   Psychiatric/Behavioral: Negative for dysphoric mood and sleep disturbance. The patient is not nervous/anxious.          PHYSICAL EXAMINATION:   VITAL SIGNS: BP 99/61   Pulse 77   Temp 97.9 °F (36.6 °C)   Resp 18   Wt 74.4 kg (164 lb)   SpO2 95%   BMI 30.00 kg/m²     Physical Exam  Vitals and nursing note reviewed.   Constitutional:       Appearance: Normal appearance. She is well-developed.      Comments: Frail elderly female in no acute distress   HENT:      Head: Normocephalic and atraumatic.      Nose: Nose normal.      Mouth/Throat:      Mouth: Mucous membranes are moist.      Pharynx: No oropharyngeal exudate.   Eyes:      General: No scleral icterus.     Conjunctiva/sclera: Conjunctivae normal.      Pupils: Pupils are equal, round, and reactive to light.   Neck:      Thyroid: No thyromegaly.    Cardiovascular:      Rate and Rhythm: Normal rate and regular rhythm.      Heart sounds: Normal heart sounds. No murmur heard.    No friction rub. No gallop.   Pulmonary:      Effort: Pulmonary effort is normal. No respiratory distress.      Breath sounds: Normal breath sounds. No wheezing.   Abdominal:      General: Bowel sounds are normal. There is no distension.      Palpations: Abdomen is soft.      Tenderness: There is no abdominal tenderness.   Musculoskeletal:         General: No deformity or signs of injury.      Cervical back: Normal range of motion and neck supple.      Comments: Right knee with well-healing surgical wounds but significant swelling without redness or drainage.   Lymphadenopathy:      Cervical: No cervical adenopathy.   Skin:     General: Skin is warm and dry.      Findings: No rash.   Neurological:      Mental Status: She is alert and oriented to person, place, and time.   Psychiatric:         Mood and Affect: Mood normal.         Behavior: Behavior normal.         RECORDS REVIEW:   Discharge Summary from [facility] 3/30/2023    ASSESSMENT   Diagnoses and all orders for this visit:    1. Other fracture of right femur, initial encounter for closed fracture (Primary)    2. Chronic obstructive pulmonary disease, unspecified COPD type    3. Hyperlipidemia, unspecified hyperlipidemia type    4. Former smoker    5. Osteoporosis without current pathological fracture, unspecified osteoporosis type        PLAN  Right periprosthetic distal femoral fracture  - Follow-up with  orthopedics as scheduled  - Pain is reasonably controlled with her current narcotic regimen.    - She does have significant swelling postoperatively which may be causing pain and limited range of motion.  Obtain CBC, CMP, ESR, CRP once to rule out signs of infection.   -Continue Lovenox for DVT prophylaxis    Chronic pain  - Continue gabapentin and oxycodone    Hyperlipidemia  - Stable on simvastatin 40 mg  daily    Osteoporosis  - Not currently on any medications.  Treatment options can be considered with PCP if appropriate.    47 minutes spent with direct patient care, review of chart, , and discussion of plans with nursing staff.    [x]  Discussed Patient in detail with nursing/staff, addressed all needs today.     [x]  Plan of Care Reviewed   [x]  PT/OT Reviewed   [x]  Order Changes  []  Discharge Plans Reviewed  [x]  Advance Directive on file with Nursing Home.   [x]  POA on file with Nursing Home.    [x]  Code Status listed and reviewed.       Ernesto Wilson DO.  4/17/2023      **Part of this note may be an electronic transcription/translation of spoken language to printed text using the Dragon Dictation System.**

## 2023-04-21 ENCOUNTER — TRANSITIONAL CARE MANAGEMENT TELEPHONE ENCOUNTER (OUTPATIENT)
Dept: CALL CENTER | Facility: HOSPITAL | Age: 78
End: 2023-04-21
Payer: MEDICARE

## 2023-04-21 ENCOUNTER — READMISSION MANAGEMENT (OUTPATIENT)
Dept: CALL CENTER | Facility: HOSPITAL | Age: 78
End: 2023-04-21
Payer: MEDICARE

## 2023-04-21 NOTE — OUTREACH NOTE
Call Center TCM Note    Flowsheet Row Responses   [facility] patient discharged from? Non-BH  [[facility]]   Does the patient have one of the following disease processes/diagnoses(primary or secondary)? Other   TCM attempt successful? Yes   Call start time 1121   Call end time 1130   Discharge diagnosis Femur fracture, ORIF   Person spoke with today (if not patient) and relationship Patient   Meds reviewed with patient/caregiver? No  [patient reports her sister has gone to the pharmacy to  meds. Reports that she will bring discharge med list to f/u appt next week. ]   Does the patient have all medications ordered at discharge? Yes  [Patient to review med list and meds. Will contact office with any needs. No needs voiced with call. ]   Is the patient taking all medications as directed (includes completed medication regime)? Yes   Comments Whitley Treadwell PA-C PCP. HOSPITAL FOLLOW UP 4/27/2023 11:00 AM   Does the patient have an appointment with their PCP within 7 days of discharge? Yes   What is the Home health agency?  Patient reports that she is supposed to have HH but has not heard from them yet.    Has home health visited the patient within 72 hours of discharge? Call prior to 72 hours   DME comments Reports using walker for ambulation   Psychosocial issues? No   Did the patient receive a copy of their discharge instructions? Yes   Nursing interventions --  [Patient to bring discharge papers to f/u appt next week. ]   What is the patient's perception of their health status since discharge? Improving  [Reports wound appears to be healing well. Ambulatory with walker. ]   Is the patient/caregiver able to teach back signs and symptoms related to disease process for when to call PCP? Yes   Is the patient/caregiver able to teach back the hierarchy of who to call/visit for symptoms/problems? PCP, Specialist, Home health nurse, Urgent Care, ED, 911 Yes   If the patient is a current smoker, are they able to  teach back resources for cessation? Not a smoker   TCM call completed? Yes   Call end time 1130   Would this patient benefit from a Referral to [facility] Social Work? No   Is the patient interested in additional calls from an ambulatory ?  NOTE:  applies to high risk patients requiring additional follow-up. No          Simran Kelly RN    4/21/2023, 11:39 EDT

## 2023-04-21 NOTE — OUTREACH NOTE
Prep Survey    Flowsheet Row Responses   [facility] facility patient discharged from? Non-BH   Is LACE score < 7 ? Non-BH Discharge   Eligibility [facility]   [facility]   Date of Discharge 04/20/23   Discharge diagnosis Unspecified fracture of right femur,   Does the patient have one of the following disease processes/diagnoses(primary or secondary)? Other   Prep survey completed? Yes          Suzie ROSE - Registered Nurse

## 2023-04-24 ENCOUNTER — TELEPHONE (OUTPATIENT)
Dept: INTERNAL MEDICINE | Facility: CLINIC | Age: 78
End: 2023-04-24
Payer: MEDICARE

## 2023-04-24 NOTE — TELEPHONE ENCOUNTER
Caller: RUTH BAILEE [facility]    Relationship: OCCUPATIONAL THERAPY    Best call back number: [phone]    What orders are you requesting (i.e. lab or imaging): VERBAL ORDERS FOR OT TWO TIMES A WEEK FOR THE NEXT THREE WEEKS.    In what timeframe would the patient need to come in: ASAP    Additional notes: PLEASE CALL TO GIVE VERBAL ORDERS.

## 2023-04-25 ENCOUNTER — TELEPHONE (OUTPATIENT)
Dept: INTERNAL MEDICINE | Facility: CLINIC | Age: 78
End: 2023-04-25
Payer: MEDICARE

## 2023-04-25 NOTE — TELEPHONE ENCOUNTER
Caller: [facility]    Relationship:     Best call back number: [phone]    What is the best time to reach you: ANYTIME     Who are you requesting to speak with (clinical staff, provider,  specific staff member): CLINICAL STAFF     What was the call regarding: KASSANDRA LOBO IS CALLING TO SEE IF THE OFFICE RECEIVED THE FAX THAT THEY SENT OVER FOR AN ORDER FROM [facility] FOR THE PATIENT TO RECEIVE A WHEELCHAIR.     Do you require a callback: YES

## 2023-04-26 NOTE — TELEPHONE ENCOUNTER
Provider: JOSE HUDSON    Caller: [facility]     Phone Number: [phone]    Reason for Call: [facility] IS CALLING BACK TO CHECK THE STATUS OF THIS BECAUSE SHE IS NEEDING THE WHEELCHAIR BEFORE SHE CAN GO TO [facility] AND THEY SAID SHE HAS AN APPOINTMENT TOMORROW

## 2023-04-26 NOTE — TELEPHONE ENCOUNTER
I called [employer]. Pt can use wheelchairs at facilities if needed. The paperwork was just placed on [employer] desk today. I left office # for them to return call with any questions or concerns.     Form faxed in to office on 4/27/2023. States the fax was successful.

## 2023-04-28 NOTE — TELEPHONE ENCOUNTER
Attempted to call OT therapist. Did not LVM for her to return call. Will attempt to call back on Monday.

## 2023-05-02 ENCOUNTER — OFFICE VISIT (OUTPATIENT)
Dept: INTERNAL MEDICINE | Facility: CLINIC | Age: 78
End: 2023-05-02
Payer: MEDICARE

## 2023-05-02 ENCOUNTER — LAB (OUTPATIENT)
Dept: INTERNAL MEDICINE | Facility: CLINIC | Age: 78
End: 2023-05-02
Payer: MEDICARE

## 2023-05-02 ENCOUNTER — TELEPHONE (OUTPATIENT)
Dept: INTERNAL MEDICINE | Facility: CLINIC | Age: 78
End: 2023-05-02

## 2023-05-02 VITALS
SYSTOLIC BLOOD PRESSURE: 108 MMHG | DIASTOLIC BLOOD PRESSURE: 68 MMHG | OXYGEN SATURATION: 98 % | HEIGHT: 62 IN | BODY MASS INDEX: 30 KG/M2 | HEART RATE: 84 BPM | TEMPERATURE: 98.4 F | RESPIRATION RATE: 20 BRPM

## 2023-05-02 DIAGNOSIS — M19.90 ARTHRITIS: ICD-10-CM

## 2023-05-02 DIAGNOSIS — Z96.649 PERIPROSTHETIC FRACTURE OF HIP, SUBSEQUENT ENCOUNTER: Primary | ICD-10-CM

## 2023-05-02 DIAGNOSIS — M81.0 OSTEOPOROSIS WITHOUT CURRENT PATHOLOGICAL FRACTURE, UNSPECIFIED OSTEOPOROSIS TYPE: ICD-10-CM

## 2023-05-02 DIAGNOSIS — R35.0 URINARY FREQUENCY: ICD-10-CM

## 2023-05-02 DIAGNOSIS — E55.9 VITAMIN D DEFICIENCY: ICD-10-CM

## 2023-05-02 DIAGNOSIS — M97.8XXD PERIPROSTHETIC FRACTURE OF HIP, SUBSEQUENT ENCOUNTER: Primary | ICD-10-CM

## 2023-05-02 LAB
25(OH)D3 SERPL-MCNC: 22.1 NG/ML (ref 30–100)
ALBUMIN SERPL-MCNC: 4.4 G/DL (ref 3.5–5.2)
ALBUMIN/GLOB SERPL: 1.5 G/DL
ALP SERPL-CCNC: 133 U/L (ref 39–117)
ALT SERPL W P-5'-P-CCNC: 15 U/L (ref 1–33)
ANION GAP SERPL CALCULATED.3IONS-SCNC: 10.4 MMOL/L (ref 5–15)
AST SERPL-CCNC: 27 U/L (ref 1–32)
BASOPHILS # BLD AUTO: 0.06 10*3/MM3 (ref 0–0.2)
BASOPHILS NFR BLD AUTO: 0.6 % (ref 0–1.5)
BILIRUB BLD-MCNC: NEGATIVE MG/DL
BILIRUB SERPL-MCNC: 0.3 MG/DL (ref 0–1.2)
BUN SERPL-MCNC: 21 MG/DL (ref 8–23)
BUN/CREAT SERPL: 29.2 (ref 7–25)
CALCIUM SPEC-SCNC: 11.1 MG/DL (ref 8.6–10.5)
CHLORIDE SERPL-SCNC: 105 MMOL/L (ref 98–107)
CLARITY, POC: CLEAR
CO2 SERPL-SCNC: 23.6 MMOL/L (ref 22–29)
COLOR UR: YELLOW
CREAT SERPL-MCNC: 0.72 MG/DL (ref 0.57–1)
DEPRECATED RDW RBC AUTO: 46.3 FL (ref 37–54)
EGFRCR SERPLBLD CKD-EPI 2021: 86.2 ML/MIN/1.73
EOSINOPHIL # BLD AUTO: 0.25 10*3/MM3 (ref 0–0.4)
EOSINOPHIL NFR BLD AUTO: 2.7 % (ref 0.3–6.2)
ERYTHROCYTE [DISTWIDTH] IN BLOOD BY AUTOMATED COUNT: 13.9 % (ref 12.3–15.4)
EXPIRATION DATE: ABNORMAL
GLOBULIN UR ELPH-MCNC: 3 GM/DL
GLUCOSE SERPL-MCNC: 94 MG/DL (ref 65–99)
GLUCOSE UR STRIP-MCNC: NEGATIVE MG/DL
HCT VFR BLD AUTO: 35.7 % (ref 34–46.6)
HGB BLD-MCNC: 11.4 G/DL (ref 12–15.9)
IMM GRANULOCYTES # BLD AUTO: 0.03 10*3/MM3 (ref 0–0.05)
IMM GRANULOCYTES NFR BLD AUTO: 0.3 % (ref 0–0.5)
KETONES UR QL: NEGATIVE
LEUKOCYTE EST, POC: NEGATIVE
LYMPHOCYTES # BLD AUTO: 2.08 10*3/MM3 (ref 0.7–3.1)
LYMPHOCYTES NFR BLD AUTO: 22.5 % (ref 19.6–45.3)
Lab: ABNORMAL
MCH RBC QN AUTO: 29.2 PG (ref 26.6–33)
MCHC RBC AUTO-ENTMCNC: 31.9 G/DL (ref 31.5–35.7)
MCV RBC AUTO: 91.3 FL (ref 79–97)
MONOCYTES # BLD AUTO: 0.84 10*3/MM3 (ref 0.1–0.9)
MONOCYTES NFR BLD AUTO: 9.1 % (ref 5–12)
NEUTROPHILS NFR BLD AUTO: 6 10*3/MM3 (ref 1.7–7)
NEUTROPHILS NFR BLD AUTO: 64.8 % (ref 42.7–76)
NITRITE UR-MCNC: NEGATIVE MG/ML
NRBC BLD AUTO-RTO: 0 /100 WBC (ref 0–0.2)
PH UR: 5.5 [PH] (ref 5–8)
PLATELET # BLD AUTO: 535 10*3/MM3 (ref 140–450)
PMV BLD AUTO: 10.4 FL (ref 6–12)
POTASSIUM SERPL-SCNC: 5.5 MMOL/L (ref 3.5–5.2)
PROT SERPL-MCNC: 7.4 G/DL (ref 6–8.5)
PROT UR STRIP-MCNC: ABNORMAL MG/DL
RBC # BLD AUTO: 3.91 10*6/MM3 (ref 3.77–5.28)
RBC # UR STRIP: ABNORMAL /UL
SODIUM SERPL-SCNC: 139 MMOL/L (ref 136–145)
SP GR UR: 1.03 (ref 1–1.03)
UROBILINOGEN UR QL: NORMAL
WBC NRBC COR # BLD: 9.26 10*3/MM3 (ref 3.4–10.8)

## 2023-05-02 PROCEDURE — 82306 VITAMIN D 25 HYDROXY: CPT | Performed by: PHYSICIAN ASSISTANT

## 2023-05-02 PROCEDURE — 80053 COMPREHEN METABOLIC PANEL: CPT | Performed by: PHYSICIAN ASSISTANT

## 2023-05-02 PROCEDURE — 87086 URINE CULTURE/COLONY COUNT: CPT | Performed by: PHYSICIAN ASSISTANT

## 2023-05-02 PROCEDURE — 85025 COMPLETE CBC W/AUTO DIFF WBC: CPT | Performed by: PHYSICIAN ASSISTANT

## 2023-05-02 RX ORDER — FLUTICASONE FUROATE AND VILANTEROL TRIFENATATE 100; 25 UG/1; UG/1
POWDER RESPIRATORY (INHALATION)
COMMUNITY
Start: 2023-04-20

## 2023-05-02 NOTE — TELEPHONE ENCOUNTER
Caller: [facility]    Relationship:     Best call back number: [phone]    Who are you requesting to speak with (clinical staff, provider,  specific staff member): CLINICAL    Do you know the name of the person who called: NELA    What was the call regarding:  PATIENTS HOME HEALTH CALLED, MUCH MORE CONFUSED AND TROUBLE STAYING ON TASK, HAD MORE TROUBLE ON TRANSFERS AND COMPLAINING OF 9/10 PAIN. HOME HEALTH HAD SEEN HER A LOT AND SAYS THIS IS VERY OUT OF CHARACTER FOR HER. BESIDES THAT HER INCISIONS LOOK GREAT NO SIGNS OF INFECTION.    Do you require a callback: YES

## 2023-05-03 LAB — BACTERIA SPEC AEROBE CULT: NO GROWTH

## 2023-05-07 DIAGNOSIS — E83.52 HYPERCALCEMIA: Primary | ICD-10-CM

## 2023-05-08 ENCOUNTER — TELEPHONE (OUTPATIENT)
Dept: INTERNAL MEDICINE | Facility: CLINIC | Age: 78
End: 2023-05-08
Payer: MEDICARE

## 2023-05-08 ENCOUNTER — OUTSIDE FACILITY SERVICE (OUTPATIENT)
Dept: INTERNAL MEDICINE | Facility: CLINIC | Age: 78
End: 2023-05-08
Payer: MEDICARE

## 2023-05-08 PROCEDURE — G0180 MD CERTIFICATION HHA PATIENT: HCPCS | Performed by: PHYSICIAN ASSISTANT

## 2023-05-08 NOTE — TELEPHONE ENCOUNTER
----- Message from Whitley Treadwell PA-C sent at 5/7/2023  1:06 PM EDT -----  Labs overall looked good except your potassium and calcium were a little elevated. We should recheck this in a week or two as a lab visit, just be well hydrated. Your vitamin D was also low, take an otc vitamin D supplement 1610-2048 daily.

## 2023-05-08 NOTE — TELEPHONE ENCOUNTER
LVM for pt to return call with office #.     HUB - please advise the following - Labs overall looked good except your potassium and calcium were a little elevated. We should recheck this in a week or two as a lab visit, just be well hydrated. Your vitamin D was also low, take an otc vitamin D supplement 5367-6356 daily.   Urine looked good, no infection.

## 2023-05-08 NOTE — TELEPHONE ENCOUNTER
LVM for pt to return call with office #.     HUB - please relay following message - Urine looked good, no infection.

## 2023-05-08 NOTE — TELEPHONE ENCOUNTER
----- Message from Whitley Treadwell PA-C sent at 5/7/2023 12:59 PM EDT -----  Urine looked good, no infection.

## 2023-05-10 PROBLEM — R35.0 URINARY FREQUENCY: Status: ACTIVE | Noted: 2023-05-10

## 2023-05-10 PROBLEM — E55.9 VITAMIN D DEFICIENCY: Status: ACTIVE | Noted: 2023-05-10

## 2023-05-10 NOTE — ASSESSMENT & PLAN NOTE
Cont vit D and calcium, consider restarting Fosamax.   89 y.o. female with PMHx of Permanent Afib off AC due to fall risks, HTN, HFrEF p/w SOB, abdominal pain. Pt feels better now.  Abdominal pain resolved, no acute SOB or CP. 89F hx afib not on coumadin ( falls)  chf hld presents to the ED for weakness and chest pressure. The symptoms have been worsening over the past few days, denies  fevers cough but  had some nausea. Daughter present pt gives no history; daughter was concerned about her decreased appetite and SOB, reminded her of previous CHF exacerbation so she brought her to ED. Pt has hearing aids when she will go to the floor th hearing aids will leave with the daughter. She is bed bound but can transfer with help to the commode/ wheelchair. Pt is picking at things in the air, I do not think she understand anything about her condition; per daughter she was asked by the ED doc about code status and said, yes resuscitate me; I doubt she in capable of meking decisions/ understand her condition.  She was found in AF rvr better controlled now. Plan to adm, c/w Cardizem get TTE to assess EF and consult Pall re: GOELIDA

## 2023-05-10 NOTE — ASSESSMENT & PLAN NOTE
Cont PT, cont tylenol for pain, f/u with ortho as sched and ask about restarting Celebrex. Will check CMP for kidney function

## 2023-05-15 ENCOUNTER — TELEPHONE (OUTPATIENT)
Dept: INTERNAL MEDICINE | Facility: CLINIC | Age: 78
End: 2023-05-15
Payer: MEDICARE

## 2023-05-15 NOTE — TELEPHONE ENCOUNTER
LVM for C to return call with office #.     HUB - Please advise verbal orders can be given for the requested OT 1x / week for 4 weeks. Per JING Cortes.     Thank you!

## 2023-05-15 NOTE — TELEPHONE ENCOUNTER
Caller: RUTH    Relationship: [facility]    Best call back number: [phone]        What was the call regarding: EXTENDING OT IN HOME 1X A WEEK FOR 4 WEEKS, VERBAL ORDERS.     Do you require a callback: YES

## 2023-06-06 ENCOUNTER — TELEPHONE (OUTPATIENT)
Dept: INTERNAL MEDICINE | Facility: CLINIC | Age: 78
End: 2023-06-06
Payer: MEDICARE

## 2023-06-06 NOTE — TELEPHONE ENCOUNTER
Caller: EWA CRAIN    Relationship: Emergency Contact    Best call back number: [phone]     What is the medical concern/diagnosis: LEG    What specialty or service is being requested: PHYSICAL THERAPY    What is the provider, practice or medical service name: NO SPECIFIC REQUEST JUST CLOSE TO HOME IF POSSIBLE        Any additional details: PATIENT HAS BEEN RECEIVING HOME PHYSICAL THERAPY AND WILL BE RELEASED THIS WEEK.  WOULD LIKE A REFERRAL FOR PHYSICAL THERAPY AT A LOCAL OFFICE.

## 2023-06-12 DIAGNOSIS — S72.8X1A OTHER FRACTURE OF RIGHT FEMUR, INITIAL ENCOUNTER FOR CLOSED FRACTURE: Primary | ICD-10-CM

## 2023-06-12 DIAGNOSIS — M79.604 RIGHT LEG PAIN: ICD-10-CM

## 2023-06-12 NOTE — TELEPHONE ENCOUNTER
LVM for pt to return call.     HUB - if patient returns call, please advise the following -   Someone should call her to schedule or she can call scheduling at [phone] to set this up        Thank you

## 2023-06-13 NOTE — TELEPHONE ENCOUNTER
Attempt 2 - LVM for pt to return call.      HUB - if patient returns call, please advise the following -   Someone should call her to schedule or she can call scheduling at [phone] to set this up         Thank you

## 2023-07-05 PROBLEM — K59.00 CONSTIPATION: Status: ACTIVE | Noted: 2023-07-05

## 2023-07-05 PROBLEM — R60.0 LOWER EXTREMITY EDEMA: Status: ACTIVE | Noted: 2023-07-05

## 2023-07-05 PROBLEM — N39.44 NOCTURNAL ENURESIS: Status: ACTIVE | Noted: 2023-07-05

## 2023-07-14 ENCOUNTER — TELEPHONE (OUTPATIENT)
Dept: INTERNAL MEDICINE | Facility: CLINIC | Age: 78
End: 2023-07-14

## 2023-07-14 NOTE — TELEPHONE ENCOUNTER
Had a large BM this morning after calling. She asked if supp should be sued daily. I advised supp are typically used as a PRN basis. Any more advise?

## 2023-07-14 NOTE — TELEPHONE ENCOUNTER
Miralax is daily and supp is as needed for severe constipation which might be one a day for a few days in a row if no good bm for 4-5 days. Does her stomach feel better? Is constipation something that she had previously struggled with or just recently since the fracture? She can also use Dulcolax but that is prn as well. We could get her in with GI if she has chronic constipation issues, but my hope is the constipation will improve after she gets up and moving more

## 2023-07-14 NOTE — TELEPHONE ENCOUNTER
How many days since last significant BM? Does she have much stomach pain? She can take both miralax and supp, she can use supp once a day, she may need to use enema at this time. If she has a lot of pain and no BM for more than 4d she should go to ER for eval impaction.

## 2023-07-14 NOTE — TELEPHONE ENCOUNTER
Caller: EWA CRAIN    Relationship: Emergency Contact    Best call back number: [phone]    What is the best time to reach you: ANY TIME    Who are you requesting to speak with (clinical staff, provider,  specific staff member): JOSE HUDSON    Do you know the name of the person who called: EWA    What was the call regarding: EWA WOULD LIKE TO KNOW IF PATIENT CAN USE MIRALAX ALONG WITH THE SUPPOSITORIES? PATIENT HAS DONE 2 SUPPOSITORIES WITH NO RESULTS. ALSO HOW MANY SUPPOSITORIES CAN SHE USE? PLEASE ADVISE

## 2023-07-14 NOTE — TELEPHONE ENCOUNTER
Daughter informed. States she is not sure if this was ongoing but doubts it. She believes this started after the fracture. She states they are starting PT and hoping this will help her get up and moving.

## 2023-07-24 ENCOUNTER — TREATMENT (OUTPATIENT)
Dept: PHYSICAL THERAPY | Facility: CLINIC | Age: 78
End: 2023-07-24
Payer: MEDICARE

## 2023-07-24 DIAGNOSIS — S72.8X1A OTHER FRACTURE OF RIGHT FEMUR, INITIAL ENCOUNTER FOR CLOSED FRACTURE: Primary | ICD-10-CM

## 2023-07-24 PROCEDURE — 97530 THERAPEUTIC ACTIVITIES: CPT | Performed by: PHYSICAL THERAPIST

## 2023-07-24 PROCEDURE — 97110 THERAPEUTIC EXERCISES: CPT | Performed by: PHYSICAL THERAPIST

## 2023-07-24 NOTE — PROGRESS NOTES
Physical Therapy Daily Progress Note    Subjective   Jackie Cordova reports: she is doing okay today and has no new complaints.      Objective   See Exercise, Manual, and Modality Logs for complete treatment.       Assessment/Plan  Pt tolerated treatment well. She requires frequent cues for foot clearance while ambulating. She continues to demonstrate a significant valgus with her right knee. Pt would benefit from continued skilled PT.    Progress per Plan of Care           Manual Therapy:         mins  35013;  Therapeutic Exercise:    15     mins  77371;     Neuromuscular Dima:        mins  67185;    Therapeutic Activity:     10     mins  42439;     Gait Training:           mins  43568;     Ultrasound:          mins  49915;    Electrical Stimulation:         mins  13662 ( );  E-Stim Attended:        mins  48858  Iontophoresis          mins 03946   Traction          mins  97885  Fluidotherapy          mins  04360  Dry Needling          mins self-pay - No Charge  Paraffin          mins  81476    Timed Treatment:   25   mins   Total Treatment:     50   mins    Mare Arceo, PT, DPT  Physical Therapist

## 2023-07-26 ENCOUNTER — TREATMENT (OUTPATIENT)
Dept: PHYSICAL THERAPY | Facility: CLINIC | Age: 78
End: 2023-07-26
Payer: MEDICARE

## 2023-07-26 DIAGNOSIS — S72.8X1A OTHER FRACTURE OF RIGHT FEMUR, INITIAL ENCOUNTER FOR CLOSED FRACTURE: Primary | ICD-10-CM

## 2023-07-26 NOTE — PROGRESS NOTES
Physical Therapy Daily Progress Note    Subjective   Jackie Corodva reports: she feels stiff today.      Objective   See Exercise, Manual, and Modality Logs for complete treatment.       Assessment/Plan  Pt tolerated treatment well. She is unable to perform SLR without assistance. Initiated cone walking to try and help with foot clearance on the right. Pt would benefit from continued skilled PT.     Progress per Plan of Care           Manual Therapy:         mins  24983;  Therapeutic Exercise:    15     mins  49274;     Neuromuscular Dima:        mins  30500;    Therapeutic Activity:     10     mins  00985;     Gait Training:           mins  46857;     Ultrasound:          mins  97229;    Electrical Stimulation:         mins  85028 ( );  E-Stim Attended:         mins  12224  Iontophoresis          mins 71521   Traction          mins  33154  Fluidotherapy          mins  37943  Dry Needling          mins self-pay - No Charge  Paraffin          mins  65115    Timed Treatment:   25   mins   Total Treatment:     59   mins    Mare Arceo, PT, DPT  Physical Therapist

## 2023-07-31 ENCOUNTER — OFFICE VISIT (OUTPATIENT)
Dept: INTERNAL MEDICINE | Facility: CLINIC | Age: 78
End: 2023-07-31
Payer: MEDICARE

## 2023-07-31 ENCOUNTER — LAB (OUTPATIENT)
Dept: LAB | Facility: HOSPITAL | Age: 78
End: 2023-07-31
Payer: MEDICARE

## 2023-07-31 VITALS
BODY MASS INDEX: 27.09 KG/M2 | OXYGEN SATURATION: 96 % | RESPIRATION RATE: 18 BRPM | HEART RATE: 69 BPM | WEIGHT: 147.2 LBS | SYSTOLIC BLOOD PRESSURE: 118 MMHG | DIASTOLIC BLOOD PRESSURE: 74 MMHG | HEIGHT: 62 IN | TEMPERATURE: 97.1 F

## 2023-07-31 DIAGNOSIS — E78.5 HYPERLIPIDEMIA, UNSPECIFIED HYPERLIPIDEMIA TYPE: ICD-10-CM

## 2023-07-31 DIAGNOSIS — J44.9 CHRONIC OBSTRUCTIVE PULMONARY DISEASE, UNSPECIFIED COPD TYPE: ICD-10-CM

## 2023-07-31 DIAGNOSIS — Z13.29 SCREENING FOR ENDOCRINE DISORDER: ICD-10-CM

## 2023-07-31 DIAGNOSIS — K59.00 CONSTIPATION, UNSPECIFIED CONSTIPATION TYPE: Primary | ICD-10-CM

## 2023-07-31 DIAGNOSIS — R60.0 LOWER EXTREMITY EDEMA: ICD-10-CM

## 2023-07-31 DIAGNOSIS — M81.0 OSTEOPOROSIS WITHOUT CURRENT PATHOLOGICAL FRACTURE, UNSPECIFIED OSTEOPOROSIS TYPE: ICD-10-CM

## 2023-07-31 DIAGNOSIS — E55.9 VITAMIN D DEFICIENCY: ICD-10-CM

## 2023-07-31 LAB
ALBUMIN SERPL-MCNC: 4.4 G/DL (ref 3.5–5.2)
ALBUMIN/GLOB SERPL: 1.5 G/DL
ALP SERPL-CCNC: 110 U/L (ref 39–117)
ALT SERPL W P-5'-P-CCNC: 17 U/L (ref 1–33)
ANION GAP SERPL CALCULATED.3IONS-SCNC: 10.2 MMOL/L (ref 5–15)
AST SERPL-CCNC: 26 U/L (ref 1–32)
BASOPHILS # BLD AUTO: 0.05 10*3/MM3 (ref 0–0.2)
BASOPHILS NFR BLD AUTO: 0.6 % (ref 0–1.5)
BILIRUB SERPL-MCNC: 0.3 MG/DL (ref 0–1.2)
BUN SERPL-MCNC: 17 MG/DL (ref 8–23)
BUN/CREAT SERPL: 22.4 (ref 7–25)
CALCIUM SPEC-SCNC: 10.6 MG/DL (ref 8.6–10.5)
CHLORIDE SERPL-SCNC: 105 MMOL/L (ref 98–107)
CHOLEST SERPL-MCNC: 162 MG/DL (ref 0–200)
CO2 SERPL-SCNC: 25.8 MMOL/L (ref 22–29)
CREAT SERPL-MCNC: 0.76 MG/DL (ref 0.57–1)
DEPRECATED RDW RBC AUTO: 44.5 FL (ref 37–54)
EGFRCR SERPLBLD CKD-EPI 2021: 80.8 ML/MIN/1.73
EOSINOPHIL # BLD AUTO: 0.36 10*3/MM3 (ref 0–0.4)
EOSINOPHIL NFR BLD AUTO: 4.3 % (ref 0.3–6.2)
ERYTHROCYTE [DISTWIDTH] IN BLOOD BY AUTOMATED COUNT: 14 % (ref 12.3–15.4)
GLOBULIN UR ELPH-MCNC: 2.9 GM/DL
GLUCOSE SERPL-MCNC: 89 MG/DL (ref 65–99)
HCT VFR BLD AUTO: 38.6 % (ref 34–46.6)
HDLC SERPL-MCNC: 66 MG/DL (ref 40–60)
HGB BLD-MCNC: 12.3 G/DL (ref 12–15.9)
IMM GRANULOCYTES # BLD AUTO: 0.02 10*3/MM3 (ref 0–0.05)
IMM GRANULOCYTES NFR BLD AUTO: 0.2 % (ref 0–0.5)
LDLC SERPL CALC-MCNC: 83 MG/DL (ref 0–100)
LDLC/HDLC SERPL: 1.25 {RATIO}
LYMPHOCYTES # BLD AUTO: 2.57 10*3/MM3 (ref 0.7–3.1)
LYMPHOCYTES NFR BLD AUTO: 30.9 % (ref 19.6–45.3)
MCH RBC QN AUTO: 27.8 PG (ref 26.6–33)
MCHC RBC AUTO-ENTMCNC: 31.9 G/DL (ref 31.5–35.7)
MCV RBC AUTO: 87.3 FL (ref 79–97)
MONOCYTES # BLD AUTO: 0.68 10*3/MM3 (ref 0.1–0.9)
MONOCYTES NFR BLD AUTO: 8.2 % (ref 5–12)
NEUTROPHILS NFR BLD AUTO: 4.65 10*3/MM3 (ref 1.7–7)
NEUTROPHILS NFR BLD AUTO: 55.8 % (ref 42.7–76)
NRBC BLD AUTO-RTO: 0 /100 WBC (ref 0–0.2)
PLATELET # BLD AUTO: 522 10*3/MM3 (ref 140–450)
PMV BLD AUTO: 10.3 FL (ref 6–12)
POTASSIUM SERPL-SCNC: 5.1 MMOL/L (ref 3.5–5.2)
PROT SERPL-MCNC: 7.3 G/DL (ref 6–8.5)
RBC # BLD AUTO: 4.42 10*6/MM3 (ref 3.77–5.28)
SODIUM SERPL-SCNC: 141 MMOL/L (ref 136–145)
TRIGL SERPL-MCNC: 69 MG/DL (ref 0–150)
VLDLC SERPL-MCNC: 13 MG/DL (ref 5–40)
WBC NRBC COR # BLD: 8.33 10*3/MM3 (ref 3.4–10.8)

## 2023-07-31 PROCEDURE — 80061 LIPID PANEL: CPT

## 2023-07-31 PROCEDURE — 84443 ASSAY THYROID STIM HORMONE: CPT

## 2023-07-31 PROCEDURE — 85025 COMPLETE CBC W/AUTO DIFF WBC: CPT

## 2023-07-31 PROCEDURE — 80053 COMPREHEN METABOLIC PANEL: CPT

## 2023-07-31 RX ORDER — ALBUTEROL SULFATE 90 UG/1
2 AEROSOL, METERED RESPIRATORY (INHALATION) EVERY 4 HOURS PRN
Qty: 18 G | Refills: 0 | Status: SHIPPED | OUTPATIENT
Start: 2023-07-31

## 2023-07-31 RX ORDER — NYSTATIN 100000 [USP'U]/G
POWDER TOPICAL 4 TIMES DAILY
Qty: 30 G | Refills: 2 | Status: SHIPPED | OUTPATIENT
Start: 2023-07-31

## 2023-07-31 NOTE — PROGRESS NOTES
dinahatinChi Complaint  constipation    Subjective          History of Present Illness  Jackie Cordova presents to [facility] for   History of Present Illness  Constipation:  Was seen for upset stomach a month ago and had imaging that showed constipation. She used the suppository and this resolved the constipation and her stomach pain. She is taking miralax daily now to help promote soft stools.   No recent diarrhea, no blood in stools. No n/v.    HLD:  Well controlled on Simvastatin 40mg. No AE  Lab Results       Component                Value               Date                       CHOL                     167                 07/27/2022                 TRIG                     82                  07/27/2022                 HDL                      73 (H)              07/27/2022                 LDL                      79                  07/27/2022            Fasting today for labs.     Kidney Cyst/Hematuria:  Did see urology, had benign w/u for hematuria w/cystoscopy, repeat BMP and u/a yearly and f/u prn. She does have some urinary frequency notable in the evenings, recent urine culture was neg for UTI.    Osteoporosis:  Last dexa scan a several years ago. Takes Fosamax weekly most weeks. Has been on it for the last 2 years at least.     COPD:  On Breo daily. Does not have wheeze or SOA very often, just when she gets sick with bronchitis or if she goes on a long walk. Uses alb prn but not very often, was not sure when to use it.    Arthritis:  She will have joint pain with rainy weather. She has pain in elbows, knees, shoulders, occ hand pain but not too bad, sometimes has knuckle swelling. Occ rt knee swelling, had sx on this knee previously. Takes tylenol pm and celebrex for pain.    Edema:  Has had some swelling in her legs more since she broke her leg and has been in a wheel chair. No pain in calf or redness. It does go down some at night while sleeping then gets swollen again  through the day. No swelling in hands or stomach.    The following portions of the patient's history were reviewed and updated as appropriate: allergies, current medications, past family history, past medical history, past social history, past surgical history and problem list.  No Known Allergies    Current Outpatient Medications:     acetaminophen (TYLENOL) 325 MG tablet, Take 2 tablets by mouth Every 4 (Four) Hours As Needed for Mild Pain. (Patient taking differently: Take 2 tablets by mouth Every 6 (Six) Hours As Needed for Mild Pain.), Disp: , Rfl:     Breo Ellipta 100-25 MCG/ACT aerosol powder , , Disp: , Rfl:     celecoxib (CeleBREX) 200 MG capsule, , Disp: , Rfl:     pantoprazole (PROTONIX) 40 MG EC tablet, Take 1 tablet by mouth Daily., Disp: 90 tablet, Rfl: 1    simvastatin (ZOCOR) 40 MG tablet, Take 1 tablet by mouth Every Night., Disp: , Rfl:     albuterol sulfate  (90 Base) MCG/ACT inhaler, Inhale 2 puffs Every 4 (Four) Hours As Needed for Wheezing., Disp: 18 g, Rfl: 0    alendronate (FOSAMAX) 70 MG tablet, , Disp: , Rfl:     nystatin (MYCOSTATIN) 059135 UNIT/GM powder, Apply  topically to the appropriate area as directed 4 (Four) Times a Day., Disp: 30 g, Rfl: 2  New Medications Ordered This Visit   Medications    nystatin (MYCOSTATIN) 346649 UNIT/GM powder     Sig: Apply  topically to the appropriate area as directed 4 (Four) Times a Day.     Dispense:  30 g     Refill:  2    albuterol sulfate  (90 Base) MCG/ACT inhaler     Sig: Inhale 2 puffs Every 4 (Four) Hours As Needed for Wheezing.     Dispense:  18 g     Refill:  0     Social History     Tobacco Use   Smoking Status Former    Packs/day: 0.50    Years: 40.00    Pack years: 20.00    Types: Cigarettes    Start date:     Quit date:     Years since quittin.5   Smokeless Tobacco Never        Objective   Vital Signs:   Vitals:    23 0925   BP: 118/74   BP Location: Left arm   Patient Position: Sitting   Pulse: 69  "  Resp: 18   Temp: 97.1 øF (36.2 øC)   SpO2: 96%   Weight: 66.8 kg (147 lb 3.2 oz)   Height: 157.5 cm (62\")  Comment: pt reported   PainSc: 0-No pain      Body mass index is 26.92 kg/mý.  Physical Exam  Vitals reviewed.   Constitutional:       General: She is not in acute distress.     Appearance: Normal appearance.   HENT:      Head: Normocephalic and atraumatic.   Eyes:      General: No scleral icterus.     Extraocular Movements: Extraocular movements intact.      Conjunctiva/sclera: Conjunctivae normal.   Cardiovascular:      Rate and Rhythm: Normal rate and regular rhythm.      Heart sounds: Normal heart sounds. No murmur heard.  Pulmonary:      Effort: Pulmonary effort is normal. No respiratory distress.      Breath sounds: Normal breath sounds. No stridor. No wheezing or rhonchi.   Abdominal:      General: Bowel sounds are normal. There is no distension.      Palpations: Abdomen is soft. There is no mass.      Tenderness: There is no abdominal tenderness. There is no guarding.   Musculoskeletal:      Cervical back: Normal range of motion and neck supple.      Right lower leg: Edema (trace) present.      Left lower leg: Edema (trace) present.      Comments: In wheel chair   Skin:     General: Skin is warm and dry.      Coloration: Skin is not jaundiced.   Neurological:      General: No focal deficit present.      Mental Status: She is alert and oriented to person, place, and time.      Gait: Gait normal.   Psychiatric:         Mood and Affect: Mood normal.         Behavior: Behavior normal.      No LMP recorded. Patient is postmenopausal.  BMI is >= 25 and <30. (Overweight) The following options were offered after discussion;: exercise counseling/recommendations      Result Review :                   Assessment and Plan    Diagnoses and all orders for this visit:    1. Constipation, unspecified constipation type (Primary)  Assessment & Plan:  Continue increasing fiber, increasing water, take MiraLAX daily as " needed, can use glycerin suppository as needed if no bowel movement in few days      2. Chronic obstructive pulmonary disease, unspecified COPD type  Assessment & Plan:  COPD is improving with treatment.  Continue current medications.  Reviewed to use Breo daily and albuterol only prn if needed.     Orders:  -     Comprehensive Metabolic Panel; Future  -     CBC Auto Differential; Future  -     albuterol sulfate  (90 Base) MCG/ACT inhaler; Inhale 2 puffs Every 4 (Four) Hours As Needed for Wheezing.  Dispense: 18 g; Refill: 0    3. Hyperlipidemia, unspecified hyperlipidemia type  Assessment & Plan:  Lipid abnormalities are improving with treatment.  Nutritional counseling was provided. and Pharmacotherapy as ordered.  Lipids will be reassessed in 6 months. Cont Zocor    Orders:  -     Lipid Panel; Future    4. Vitamin D deficiency  Assessment & Plan:  Continue vitamin D supplement daily      5. Osteoporosis without current pathological fracture, unspecified osteoporosis type  Assessment & Plan:  Cont vit D and calcium, continue Fosamax, we will check DEXA    Orders:  -     DEXA Bone Density Axial; Future    6. Lower extremity edema  Assessment & Plan:  Check labs, may be due to recent sedentary activity from fracture, elevate legs when she can, increase low impact light activity, let me know if symptoms are worsening or persist, can use compression stockings as well    Orders:  -     Comprehensive Metabolic Panel; Future  -     CBC Auto Differential; Future  -     TSH Rfx On Abnormal To Free T4; Future    7. Screening for endocrine disorder  -     TSH Rfx On Abnormal To Free T4; Future    Other orders  -     nystatin (MYCOSTATIN) 010651 UNIT/GM powder; Apply  topically to the appropriate area as directed 4 (Four) Times a Day.  Dispense: 30 g; Refill: 2        Follow Up   Return in about 3 months (around 10/31/2023).    Follow up if symptoms worsen or persist or has new or concerning symptoms, go to ER for  severe symptoms.   Reviewed common medication effects and side effects and advised to report side effects immediately.  Encouraged medication compliance and the importance of keeping scheduled follow up appointments with me and any other providers.  If a referral was made please contact our office if you have not heard about an appointment in the next 2 weeks.   If labs or images are ordered we will contact you with the results within the next week.  If you have not heard from us after a week please call our office to inquire about the results.   Patient was given instructions and counseling regarding her condition or for health maintenance advice. Please see specific information pulled into the AVS if appropriate.     Whitley Treadwell PA-C    * Please note that portions of this note were completed with a voice recognition program.

## 2023-08-01 ENCOUNTER — TELEPHONE (OUTPATIENT)
Dept: INTERNAL MEDICINE | Facility: CLINIC | Age: 78
End: 2023-08-01
Payer: MEDICARE

## 2023-08-01 LAB — TSH SERPL DL<=0.05 MIU/L-ACNC: 1.41 UIU/ML (ref 0.27–4.2)

## 2023-08-06 NOTE — ASSESSMENT & PLAN NOTE
Check labs, may be due to recent sedentary activity from fracture, elevate legs when she can, increase low impact light activity, let me know if symptoms are worsening or persist, can use compression stockings as well

## 2023-08-06 NOTE — ASSESSMENT & PLAN NOTE
Continue increasing fiber, increasing water, take MiraLAX daily as needed, can use glycerin suppository as needed if no bowel movement in few days

## 2023-08-08 ENCOUNTER — TREATMENT (OUTPATIENT)
Dept: PHYSICAL THERAPY | Facility: CLINIC | Age: 78
End: 2023-08-08
Payer: MEDICARE

## 2023-08-08 DIAGNOSIS — S72.8X1A OTHER FRACTURE OF RIGHT FEMUR, INITIAL ENCOUNTER FOR CLOSED FRACTURE: Primary | ICD-10-CM

## 2023-08-08 NOTE — PROGRESS NOTES
Physical Therapy Daily Progress Note    Subjective   Jackie Cordova reports: she is doing fine and is trying to work on raising her feet up more while walking.      Objective   See Exercise, Manual, and Modality Logs for complete treatment.       Assessment/Plan  Pt tolerated treatment well. She requires frequent cues to not allow knee valgus while performing heel slides and bridges. Pt would benefit from continued skilled PT.     Progress per Plan of Care           Manual Therapy:         mins  89588;  Therapeutic Exercise:    15     mins  71303;     Neuromuscular Dima:    15    mins  48168;    Therapeutic Activity:     16     mins  25061;     Gait Training:           mins  46796;     Ultrasound:          mins  00755;    Electrical Stimulation:         mins  74419 ( );  E-Stim Attended:         mins  16909  Iontophoresis          mins 04729   Traction          mins  78890  Fluidotherapy          mins  51184  Dry Needling         mins self-pay - No Charge  Paraffin          mins  79319    Timed Treatment:   46   mins   Total Treatment:     46   mins    Mare Arceo, PT, DPT  Physical Therapist

## 2023-08-09 ENCOUNTER — HOSPITAL ENCOUNTER (OUTPATIENT)
Dept: BONE DENSITY | Facility: HOSPITAL | Age: 78
Discharge: HOME | End: 2023-08-09
Admitting: PHYSICIAN ASSISTANT
Payer: MEDICARE

## 2023-08-09 DIAGNOSIS — M81.0 OSTEOPOROSIS WITHOUT CURRENT PATHOLOGICAL FRACTURE, UNSPECIFIED OSTEOPOROSIS TYPE: ICD-10-CM

## 2023-08-09 PROCEDURE — 77080 DXA BONE DENSITY AXIAL: CPT

## 2023-08-10 ENCOUNTER — TREATMENT (OUTPATIENT)
Dept: PHYSICAL THERAPY | Facility: CLINIC | Age: 78
End: 2023-08-10
Payer: MEDICARE

## 2023-08-10 DIAGNOSIS — S72.8X1A OTHER FRACTURE OF RIGHT FEMUR, INITIAL ENCOUNTER FOR CLOSED FRACTURE: Primary | ICD-10-CM

## 2023-08-10 NOTE — PROGRESS NOTES
Physical Therapy Daily Progress Note    Subjective   Jackie Cordova reports: 50% improvement since starting PT. She still feels really stiff in her right knee and hip.     Today's Pain ratin/10    Objective          Active Range of Motion     Right Hip   Flexion: 75 degrees   Abduction: 25 degrees     Strength/Myotome Testing     Left Hip   Planes of Motion   Flexion: 4+  Abduction: 4+  Adduction: 4+    Right Hip   Planes of Motion   Flexion: 3+  Abduction: 4+  Adduction: 4+    Left Knee   Flexion: 5  Extension: 5    Right Knee   Flexion: 5  Extension: 5    Left Ankle/Foot   Dorsiflexion: 5    Right Ankle/Foot   Dorsiflexion: 5    See Exercise, Manual, and Modality Logs for complete treatment.       Assessment/Plan  Pt is tolerating treatment well. Her strength is improving but she struggles the most with hip flexion. Her AROM is improving slowly but is still limited. She requires constant cues to  her feet while ambulating. Pt would benefit from continued skilled PT.    Progress per Plan of Care           Manual Therapy:         mins  90275;  Therapeutic Exercise:    23     mins  99827;     Neuromuscular Dima:    15   mins  09960;    Therapeutic Activity:     15     mins  08515;     Gait Training:           mins  10136;     Ultrasound:          mins  31321;    Electrical Stimulation:         mins  16483 ( );  E-Stim Attended:         mins  20485  Iontophoresis          mins 61038   Traction          mins  36491  Fluidotherapy          mins  21080  Dry Needling          mins self-pay - No Charge  Paraffin         mins  07985    Timed Treatment:   53   mins   Total Treatment:     53   mins    Mare Arceo, PT, DPT  Physical Therapist

## 2023-08-15 ENCOUNTER — TREATMENT (OUTPATIENT)
Dept: PHYSICAL THERAPY | Facility: CLINIC | Age: 78
End: 2023-08-15
Payer: MEDICARE

## 2023-08-15 DIAGNOSIS — S72.8X1A OTHER FRACTURE OF RIGHT FEMUR, INITIAL ENCOUNTER FOR CLOSED FRACTURE: Primary | ICD-10-CM

## 2023-08-15 NOTE — PROGRESS NOTES
Physical Therapy Daily Progress Note    Subjective   Jackie Cordova reports: the back of her knee feels sore and swollen today. She is not sure why.      Objective   See Exercise, Manual, and Modality Logs for complete treatment.       Assessment/Plan  Pt tolerated treatment well. Treatment focused on hip flexor and quad strength. She required min A with cone walking. Pt would benefit from continued skilled PT.     Progress per Plan of Care           Manual Therapy:         mins  15338;  Therapeutic Exercise:    15     mins  39107;     Neuromuscular Dima:        mins  49858;    Therapeutic Activity:     12     mins  58702;     Gait Training:           mins  95480;     Ultrasound:          mins  80652;    Electrical Stimulation:         mins  22738 ( );  E-Stim Attended:         mins  67999  Iontophoresis          mins 83024   Traction          mins  52063  Fluidotherapy          mins  27321  Dry Needling          mins self-pay - No Charge  Paraffin          mins  26600    Timed Treatment:   27   mins   Total Treatment:     59   mins    Mare Arceo, PT, DPT  Physical Therapist

## 2023-08-17 ENCOUNTER — TREATMENT (OUTPATIENT)
Dept: PHYSICAL THERAPY | Facility: CLINIC | Age: 78
End: 2023-08-17
Payer: MEDICARE

## 2023-08-17 DIAGNOSIS — S72.8X1A OTHER FRACTURE OF RIGHT FEMUR, INITIAL ENCOUNTER FOR CLOSED FRACTURE: Primary | ICD-10-CM

## 2023-08-17 NOTE — PROGRESS NOTES
Physical Therapy Daily Treatment Note                  [address] [facility] [address] [place] [address]      Patient: Jackie Cordova   : 1945  Diagnosis/ICD-10 Code:  Other fracture of right femur, initial encounter for closed fracture [S72.8X1A]  Referring practitioner: Whitley Treadwell PA-C  Date of Initial Visit: Type: THERAPY  Noted: 2023  Today's Date: 2023  Patient seen for 10 sessions             Subjective   Jackie Cordova reports: pretty stiff this morning and had a big workout from last visit. Pt notes she is still a little sore.     Objective   See Exercise, Manual, and Modality Logs for complete treatment.       Assessment/Plan  Able to get up off of low surface 5x with one hand UE assist SUP/Touch A as needed. Slow with activities but able to tolerate sets/reps given.   Progress per Plan of Care and Progress strengthening /stabilization /functional activity           Timed:  Manual Therapy:         mins  68819;  Therapeutic Exercise:    30     mins  55685;     Neuromuscular Dima:        mins  36559;    Therapeutic Activity:     12     mins  65251;     Gait Training:           mins  57174;     Ultrasound:          mins  24428;    Electrical Stimulation:         mins  38945;  Iontophoresis          mins  79222    Untimed:  Electrical Stimulation:         mins  21504 ( );  Mechanical Traction:         mins  68899;   Fluidotherapy          mins  19016    Timed Treatment:   42   mins   Total Treatment:     48   mins        Juanita Osborne PTA  Physical Therapist Assistant

## 2023-08-21 ENCOUNTER — TREATMENT (OUTPATIENT)
Dept: PHYSICAL THERAPY | Facility: CLINIC | Age: 78
End: 2023-08-21
Payer: MEDICARE

## 2023-08-21 DIAGNOSIS — S72.8X1A OTHER FRACTURE OF RIGHT FEMUR, INITIAL ENCOUNTER FOR CLOSED FRACTURE: Primary | ICD-10-CM

## 2023-08-21 NOTE — PROGRESS NOTES
Physical Therapy Daily Progress Note    Subjective   Jackie Cordova reports: she is doing fine. She still notices she shuffles a lot when walking.      Objective   See Exercise, Manual, and Modality Logs for complete treatment.       Assessment/Plan  Pt tolerated treatment well. She continues to require mod/max assistance with lifting her right leg. She is progressing well and would benefit from continued skilled PT.     Progress per Plan of Care           Manual Therapy:         mins  28258;  Therapeutic Exercise:    24     mins  41040;     Neuromuscular Dima:    15    mins  50481;    Therapeutic Activity:     15     mins  21333;     Gait Training:           mins  21082;     Ultrasound:          mins  84266;    Electrical Stimulation:         mins  69715 ( );  E-Stim Attended:         mins  69564  Iontophoresis          mins 26797   Traction          mins  11273  Fluidotherapy          mins  54060  Dry Needling          mins self-pay - No Charge  Paraffin          mins  49254    Timed Treatment:   54   mins   Total Treatment:     54   mins    Mare Arceo, PT, DPT  Physical Therapist

## 2023-08-31 ENCOUNTER — TREATMENT (OUTPATIENT)
Dept: PHYSICAL THERAPY | Facility: CLINIC | Age: 78
End: 2023-08-31
Payer: MEDICARE

## 2023-08-31 DIAGNOSIS — S72.8X1A OTHER FRACTURE OF RIGHT FEMUR, INITIAL ENCOUNTER FOR CLOSED FRACTURE: Primary | ICD-10-CM

## 2023-08-31 NOTE — PROGRESS NOTES
Physical Therapy Daily Progress Note    Subjective   Jackie Cordova reports: she still has swelling behind her right knee and down into her foot.       Objective   See Exercise, Manual, and Modality Logs for complete treatment.       Assessment/Plan  Pt tolerated treatment well. She is not able to perform marches yet but is able to hold the position if she has assistance on the way up. Discussed compression socks to help with swelling. Pt would benefit from continued skilled PT.    Progress per Plan of Care           Manual Therapy:         mins  01924;  Therapeutic Exercise:    15     mins  19693;     Neuromuscular Dima:        mins  40877;    Therapeutic Activity:     12     mins  82662;     Gait Training:           mins  47250;     Ultrasound:          mins  07467;    Electrical Stimulation:         mins  99675 ( );  E-Stim Attended:         mins  39711  Iontophoresis          mins 91873   Traction          mins  43393  Fluidotherapy          mins  91507  Dry Needling          mins self-pay - No Charge  Paraffin          mins  97333    Timed Treatment:   27   mins   Total Treatment:     56   mins    Mare Arceo, PT, DPT  Physical Therapist

## 2023-09-12 ENCOUNTER — TELEPHONE (OUTPATIENT)
Dept: INTERNAL MEDICINE | Facility: CLINIC | Age: 78
End: 2023-09-12

## 2023-09-12 NOTE — TELEPHONE ENCOUNTER
Caller: EWA CRAIN    Relationship: Emergency Contact    Best call back number: [phone]    What is the best time to reach you: ANYTIME     Who are you requesting to speak with (clinical staff, provider,  specific staff member): CLINICAL STAFF     What was the call regarding: PATIENT'S SISTER IS CALLING TO LET THE OFFICE KNOW THAT THE PATIENT FELL ON THURSDAY 09/07 AND BROKE HER RIGHT WRIST. SHE WAS SEEN AT [facility] AND WAS SCHEDULED FOR SURGERY BUT THEN THE DOCTOR DECIDED NOT TO CONTINUE.     Is it okay if the provider responds through MyChart: NO

## 2023-09-13 NOTE — TELEPHONE ENCOUNTER
Returned call to [facility]      Caller: EWA CRAIN     Relationship: Emergency Contact     Best call back number: [phone]      [facility]zz stated that pt will need care temporarily to get her back on her feet due to wrist being broken when she fell and may be an assisted living facility because [facility] cannot take care of her if she falls again. Buzz hopes she can be pointed in the right direction with any suggestions or help.

## 2023-09-13 NOTE — TELEPHONE ENCOUNTER
Very sorry to hear that, how is she doing? It looks like she is set up with [facility] next week already. Let me know if they need anything from us in the mean time.   Please call to get office notes from [facility] where she was already seen.

## 2023-09-15 NOTE — TELEPHONE ENCOUNTER
Refill request celecoxib   Last refill 6/20/23  Unknown provider   Last visit 7/31/23  Next visit 10/30/23

## 2023-09-15 NOTE — TELEPHONE ENCOUNTER
Pili called back again to check status on a referral for an assisted living location to help with her sister's rehab.  Patient fell and broke her wrist after having a broken leg in March.  Patient is not getting around well and sister is unable to care for her.  Pili is asking for a call back today if possible so that she can get the patient into rehab facility.

## 2023-09-15 NOTE — TELEPHONE ENCOUNTER
Caller: EWA CRAIN    Relationship: Emergency Contact    Best call back number: [phone]    What is the best time to reach you: ANYTIME    Who are you requesting to speak with (clinical staff, provider,  specific staff member): JOSE HUDSON    Do you know the name of the person who called: EWA GAMA    What was the call regarding: CONCERNED ABOUT PATIENT'S CARE    Is it okay if the provider responds through MyChart:

## 2023-09-16 RX ORDER — CELECOXIB 200 MG/1
CAPSULE ORAL
Qty: 90 CAPSULE | Refills: 0 | Status: SHIPPED | OUTPATIENT
Start: 2023-09-16

## 2023-09-18 NOTE — TELEPHONE ENCOUNTER
Sister called checking on status of referral. Patient fell and broke her wrist. [facility] was going to do surgery and decided not to. Patient is very weak and sister is concerned for safety. Is requesting rehab for physical therapy.

## 2023-09-22 ENCOUNTER — TELEPHONE (OUTPATIENT)
Dept: INTERNAL MEDICINE | Facility: CLINIC | Age: 78
End: 2023-09-22

## 2023-09-22 NOTE — TELEPHONE ENCOUNTER
Caller: EWA CRAIN    Relationship: Emergency Contact    Best call back number: [phone]     What is the best time to reach you: ANYTIME    Who are you requesting to speak with (clinical staff, provider,  specific staff member): JOSE HUDSON    What was the call regarding: PATIENT'S EMERGENCY CONTACT WOULD LIKE A CALL ABOUT GETTING SOME ASSISTANCE FOR THE PATIENT.

## 2023-09-22 NOTE — TELEPHONE ENCOUNTER
Daughter states she needs HHC, PT and OT. She had this previously for her broken leg previously. Is this something you can order? She is currently in a wheelchair.     Also requesting a Walker with arm rests to walk with. She does not want her to loose anything she has gained with the stability.

## 2023-09-23 DIAGNOSIS — Z96.649 PERIPROSTHETIC FRACTURE OF HIP, SUBSEQUENT ENCOUNTER: ICD-10-CM

## 2023-09-23 DIAGNOSIS — Z91.81 HISTORY OF FALL: Primary | ICD-10-CM

## 2023-09-23 DIAGNOSIS — S72.8X1G OTHER CLOSED FRACTURE OF RIGHT FEMUR WITH DELAYED HEALING, UNSPECIFIED PORTION OF FEMUR, SUBSEQUENT ENCOUNTER: ICD-10-CM

## 2023-09-23 DIAGNOSIS — R53.1 GENERALIZED WEAKNESS: ICD-10-CM

## 2023-09-23 DIAGNOSIS — M97.8XXD PERIPROSTHETIC FRACTURE OF HIP, SUBSEQUENT ENCOUNTER: ICD-10-CM

## 2023-09-23 DIAGNOSIS — S62.101D CLOSED FRACTURE OF RIGHT WRIST WITH ROUTINE HEALING, SUBSEQUENT ENCOUNTER: ICD-10-CM

## 2023-09-23 PROBLEM — S62.101A CLOSED FRACTURE OF RIGHT WRIST: Status: ACTIVE | Noted: 2023-09-23

## 2023-09-23 NOTE — TELEPHONE ENCOUNTER
Order placed for home health with PT and OT, someone should call to set that up. Order placed for walker with platform for right arm, please print and fax to requested DME store.

## 2023-09-25 DIAGNOSIS — S62.101A CLOSED FRACTURE OF RIGHT WRIST, INITIAL ENCOUNTER: ICD-10-CM

## 2023-09-25 DIAGNOSIS — N17.9 AKI (ACUTE KIDNEY INJURY): ICD-10-CM

## 2023-09-25 DIAGNOSIS — S72.8X1A OTHER CLOSED FRACTURE OF RIGHT FEMUR, UNSPECIFIED PORTION OF FEMUR, INITIAL ENCOUNTER: ICD-10-CM

## 2023-09-25 DIAGNOSIS — R53.1 GENERALIZED WEAKNESS: ICD-10-CM

## 2023-09-25 DIAGNOSIS — Z91.81 HISTORY OF FALL: Primary | ICD-10-CM

## 2023-09-25 NOTE — TELEPHONE ENCOUNTER
Spoke with emergency contact. She states she is unable to be with the patient constant after this week. Her daughter travels with her company and she needs to be there for her 15 year old grandson.     She was under the impression patient could do inpatient PT / OT. Per Whitley, she is unable to do this because she has already had inpatient PT / OT and was transitioned to her home. She states she does not want her to be placed in a nursing home but cannot eb there for her during the nighttime and in the morning.     Whitley states she will refer her to . I advised her to speak openly to the  about her concerns. I advised her we would place the PT/OT orders, as well as send over the walker order to [facility], for Ms. Cordova to receive these at her home.

## 2023-09-26 ENCOUNTER — REFERRAL TRIAGE (OUTPATIENT)
Dept: CASE MANAGEMENT | Facility: OTHER | Age: 78
End: 2023-09-26
Payer: MEDICARE

## 2023-09-27 ENCOUNTER — PATIENT OUTREACH (OUTPATIENT)
Dept: CASE MANAGEMENT | Facility: OTHER | Age: 78
End: 2023-09-27
Payer: MEDICARE

## 2023-09-27 NOTE — OUTREACH NOTE
Social Work Assessment  Questions/Answers      Flowsheet Row Most Recent Value   Referral Source physician   Reason for Consult community resources   Preferred Language English   Advance Care Planning Reviewed no concerns identified   People in Home alone   Current Living Arrangements home   Potentially Unsafe Housing Conditions none   Primary Care Provided by other (see comments)   Provides Primary Care For no one, unable/limited ability to care for self   Family Caregiver if Needed sibling(s)   Quality of Family Relationships supportive, involved, helpful   Source of Income social security   Medications assistive person   Meal Preparation assistive person   Housekeeping assistive person   Laundry assistive person   Shopping assistive person        SDOH updated and reviewed with the patient during this program:  Financial Resource Strain: Low Risk     Difficulty of Paying Living Expenses: Not hard at all      Food Insecurity: No Food Insecurity    Worried About Running Out of Food in the Last Year: Never true    Ran Out of Food in the Last Year: Never true      Transportation Needs: No Transportation Needs    Lack of Transportation (Medical): No    Lack of Transportation (Non-Medical): No      Housing Stability: Low Risk     Unable to Pay for Housing in the Last Year: No    Number of Places Lived in the Last Year: 1    Unstable Housing in the Last Year: No     Patient Outreach    SW contacted pt sister after receiving a provider referral. Pt sister reports that she is the only one available to help pt and she is not going to be able to stay with her ongoing if she continues to need assistance. She is worried that pt will loose the ability to walk. She was hoping that pt would be able to work with physical therapy at home, but the orthopedist pt sees for her wrist told her she would not be able to do physical therapy currently. She is hoping that after their appointment in three weeks, pt will be cleared to work  with physical therapy and might be able to go back to rehab. If pt is not able to improve her strength, pt sister thinks she may need to go a long term care facility. SW provided education on placement process for both long term care and rehab. SW will follow up with family to see if they are able to resume working with physical therapy and see if referrals need to be sent.     Natali KNAPP -   Ambulatory Case Management    9/27/2023, 11:43 EDT

## 2023-10-16 RX ORDER — SIMVASTATIN 40 MG
40 TABLET ORAL
Qty: 30 TABLET | OUTPATIENT
Start: 2023-10-16

## 2023-10-23 RX ORDER — SIMVASTATIN 40 MG
40 TABLET ORAL
Qty: 30 TABLET | OUTPATIENT
Start: 2023-10-23

## 2023-10-25 ENCOUNTER — EXTERNAL PBMM DATA (OUTPATIENT)
Dept: PHARMACY | Facility: OTHER | Age: 78
End: 2023-10-25
Payer: MEDICARE

## 2023-10-25 ENCOUNTER — PATIENT OUTREACH (OUTPATIENT)
Dept: CASE MANAGEMENT | Facility: OTHER | Age: 78
End: 2023-10-25
Payer: MEDICARE

## 2023-10-25 NOTE — OUTREACH NOTE
Patient Outreach    TACHO contacted pt sister. Pt sister is being released from the ortho that is treating her arm and she is hoping that pt can resume physical therapy outpatient. She plans to discuss it at pt PCP appointment tomorrow. SW encouraged her to call if SW can assist.    Natali KNAPP -   Ambulatory Case Management    10/25/2023, 14:44 EDT

## 2023-10-30 ENCOUNTER — OFFICE VISIT (OUTPATIENT)
Dept: INTERNAL MEDICINE | Facility: CLINIC | Age: 78
End: 2023-10-30
Payer: MEDICARE

## 2023-10-30 ENCOUNTER — LAB (OUTPATIENT)
Dept: INTERNAL MEDICINE | Facility: CLINIC | Age: 78
End: 2023-10-30
Payer: MEDICARE

## 2023-10-30 VITALS
OXYGEN SATURATION: 97 % | TEMPERATURE: 97.5 F | SYSTOLIC BLOOD PRESSURE: 120 MMHG | RESPIRATION RATE: 16 BRPM | BODY MASS INDEX: 29.26 KG/M2 | WEIGHT: 159 LBS | HEIGHT: 62 IN | DIASTOLIC BLOOD PRESSURE: 66 MMHG | HEART RATE: 69 BPM

## 2023-10-30 DIAGNOSIS — M25.511 CHRONIC RIGHT SHOULDER PAIN: ICD-10-CM

## 2023-10-30 DIAGNOSIS — Z23 INFLUENZA VACCINE NEEDED: Primary | ICD-10-CM

## 2023-10-30 DIAGNOSIS — E78.5 HYPERLIPIDEMIA, UNSPECIFIED HYPERLIPIDEMIA TYPE: ICD-10-CM

## 2023-10-30 DIAGNOSIS — M25.561 CHRONIC PAIN OF BOTH KNEES: ICD-10-CM

## 2023-10-30 DIAGNOSIS — R53.1 GENERALIZED WEAKNESS: ICD-10-CM

## 2023-10-30 DIAGNOSIS — M81.0 OSTEOPOROSIS WITHOUT CURRENT PATHOLOGICAL FRACTURE, UNSPECIFIED OSTEOPOROSIS TYPE: ICD-10-CM

## 2023-10-30 DIAGNOSIS — S62.101A CLOSED FRACTURE OF RIGHT WRIST, INITIAL ENCOUNTER: ICD-10-CM

## 2023-10-30 DIAGNOSIS — E83.52 HYPERCALCEMIA: ICD-10-CM

## 2023-10-30 DIAGNOSIS — M25.562 CHRONIC PAIN OF BOTH KNEES: ICD-10-CM

## 2023-10-30 DIAGNOSIS — E55.9 VITAMIN D DEFICIENCY: Primary | ICD-10-CM

## 2023-10-30 DIAGNOSIS — J44.9 CHRONIC OBSTRUCTIVE PULMONARY DISEASE, UNSPECIFIED COPD TYPE: ICD-10-CM

## 2023-10-30 DIAGNOSIS — G89.29 CHRONIC PAIN OF BOTH KNEES: ICD-10-CM

## 2023-10-30 DIAGNOSIS — E55.9 VITAMIN D DEFICIENCY: ICD-10-CM

## 2023-10-30 DIAGNOSIS — K21.9 GERD WITHOUT ESOPHAGITIS: ICD-10-CM

## 2023-10-30 DIAGNOSIS — G89.29 CHRONIC RIGHT SHOULDER PAIN: ICD-10-CM

## 2023-10-30 LAB
25(OH)D3 SERPL-MCNC: 26.2 NG/ML (ref 30–100)
ALBUMIN SERPL-MCNC: 4.5 G/DL (ref 3.5–5.2)
ALBUMIN/GLOB SERPL: 1.8 G/DL
ALP SERPL-CCNC: 111 U/L (ref 39–117)
ALT SERPL W P-5'-P-CCNC: 15 U/L (ref 1–33)
ANION GAP SERPL CALCULATED.3IONS-SCNC: 10.5 MMOL/L (ref 5–15)
AST SERPL-CCNC: 21 U/L (ref 1–32)
BASOPHILS # BLD AUTO: 0.04 10*3/MM3 (ref 0–0.2)
BASOPHILS NFR BLD AUTO: 0.5 % (ref 0–1.5)
BILIRUB SERPL-MCNC: 0.4 MG/DL (ref 0–1.2)
BUN SERPL-MCNC: 24 MG/DL (ref 8–23)
BUN/CREAT SERPL: 28.9 (ref 7–25)
CALCIUM SPEC-SCNC: 9.9 MG/DL (ref 8.6–10.5)
CHLORIDE SERPL-SCNC: 105 MMOL/L (ref 98–107)
CHOLEST SERPL-MCNC: 159 MG/DL (ref 0–200)
CO2 SERPL-SCNC: 22.5 MMOL/L (ref 22–29)
CREAT SERPL-MCNC: 0.83 MG/DL (ref 0.57–1)
DEPRECATED RDW RBC AUTO: 48.4 FL (ref 37–54)
EGFRCR SERPLBLD CKD-EPI 2021: 72.7 ML/MIN/1.73
EOSINOPHIL # BLD AUTO: 0.29 10*3/MM3 (ref 0–0.4)
EOSINOPHIL NFR BLD AUTO: 3.9 % (ref 0.3–6.2)
ERYTHROCYTE [DISTWIDTH] IN BLOOD BY AUTOMATED COUNT: 15.1 % (ref 12.3–15.4)
GLOBULIN UR ELPH-MCNC: 2.5 GM/DL
GLUCOSE SERPL-MCNC: 77 MG/DL (ref 65–99)
HCT VFR BLD AUTO: 35.4 % (ref 34–46.6)
HDLC SERPL-MCNC: 65 MG/DL (ref 40–60)
HGB BLD-MCNC: 11.8 G/DL (ref 12–15.9)
IMM GRANULOCYTES # BLD AUTO: 0.02 10*3/MM3 (ref 0–0.05)
IMM GRANULOCYTES NFR BLD AUTO: 0.3 % (ref 0–0.5)
LDLC SERPL CALC-MCNC: 81 MG/DL (ref 0–100)
LDLC/HDLC SERPL: 1.25 {RATIO}
LYMPHOCYTES # BLD AUTO: 1.86 10*3/MM3 (ref 0.7–3.1)
LYMPHOCYTES NFR BLD AUTO: 25.3 % (ref 19.6–45.3)
MCH RBC QN AUTO: 29.5 PG (ref 26.6–33)
MCHC RBC AUTO-ENTMCNC: 33.3 G/DL (ref 31.5–35.7)
MCV RBC AUTO: 88.5 FL (ref 79–97)
MONOCYTES # BLD AUTO: 0.66 10*3/MM3 (ref 0.1–0.9)
MONOCYTES NFR BLD AUTO: 9 % (ref 5–12)
NEUTROPHILS NFR BLD AUTO: 4.49 10*3/MM3 (ref 1.7–7)
NEUTROPHILS NFR BLD AUTO: 61 % (ref 42.7–76)
NRBC BLD AUTO-RTO: 0 /100 WBC (ref 0–0.2)
PLATELET # BLD AUTO: 415 10*3/MM3 (ref 140–450)
PMV BLD AUTO: 10.5 FL (ref 6–12)
POTASSIUM SERPL-SCNC: 4.7 MMOL/L (ref 3.5–5.2)
PROT SERPL-MCNC: 7 G/DL (ref 6–8.5)
PTH-INTACT SERPL-MCNC: 67.5 PG/ML (ref 15–65)
RBC # BLD AUTO: 4 10*6/MM3 (ref 3.77–5.28)
SODIUM SERPL-SCNC: 138 MMOL/L (ref 136–145)
TRIGL SERPL-MCNC: 65 MG/DL (ref 0–150)
VLDLC SERPL-MCNC: 13 MG/DL (ref 5–40)
WBC NRBC COR # BLD: 7.36 10*3/MM3 (ref 3.4–10.8)

## 2023-10-30 PROCEDURE — 85025 COMPLETE CBC W/AUTO DIFF WBC: CPT | Performed by: PHYSICIAN ASSISTANT

## 2023-10-30 PROCEDURE — 83970 ASSAY OF PARATHORMONE: CPT | Performed by: PHYSICIAN ASSISTANT

## 2023-10-30 PROCEDURE — 80061 LIPID PANEL: CPT | Performed by: PHYSICIAN ASSISTANT

## 2023-10-30 PROCEDURE — 82306 VITAMIN D 25 HYDROXY: CPT | Performed by: PHYSICIAN ASSISTANT

## 2023-10-30 PROCEDURE — 80053 COMPREHEN METABOLIC PANEL: CPT | Performed by: PHYSICIAN ASSISTANT

## 2023-10-30 PROCEDURE — 36415 COLL VENOUS BLD VENIPUNCTURE: CPT | Performed by: PHYSICIAN ASSISTANT

## 2023-10-30 RX ORDER — SIMVASTATIN 40 MG
40 TABLET ORAL NIGHTLY
Qty: 90 TABLET | Refills: 3 | Status: SHIPPED | OUTPATIENT
Start: 2023-10-30

## 2023-10-30 RX ORDER — ALBUTEROL SULFATE 90 UG/1
2 AEROSOL, METERED RESPIRATORY (INHALATION) EVERY 4 HOURS PRN
Qty: 18 G | Refills: 0 | Status: SHIPPED | OUTPATIENT
Start: 2023-10-30

## 2023-10-30 RX ORDER — CELECOXIB 200 MG/1
200 CAPSULE ORAL DAILY
Qty: 90 CAPSULE | Refills: 2 | Status: SHIPPED | OUTPATIENT
Start: 2023-10-30

## 2023-10-30 RX ORDER — ALENDRONATE SODIUM 70 MG/1
70 TABLET ORAL
COMMUNITY

## 2023-10-30 RX ORDER — FLUTICASONE FUROATE AND VILANTEROL TRIFENATATE 100; 25 UG/1; UG/1
1 POWDER RESPIRATORY (INHALATION)
Qty: 60 EACH | Refills: 5 | Status: SHIPPED | OUTPATIENT
Start: 2023-10-30

## 2023-10-30 RX ORDER — PANTOPRAZOLE SODIUM 40 MG/1
40 TABLET, DELAYED RELEASE ORAL DAILY
Qty: 90 TABLET | Refills: 2 | Status: SHIPPED | OUTPATIENT
Start: 2023-10-30

## 2023-10-30 NOTE — PROGRESS NOTES
Chief Complaint  Hyperlipidemia, COPD, and Urinary Incontinence (Worse at night, would like something to help )    Subjective          History of Present Illness  Jackie Cordova presents to [facility] for   History of Present Illness  Constipation:  Was seen for upset stomach a month ago and had imaging that showed constipation. She used the suppository and this resolved the constipation and her stomach pain. She is taking miralax daily now to help promote soft stools.   No recent diarrhea, no blood in stools. No n/v.    HLD:  Well controlled on Simvastatin 40mg. No AE  Lab Results       Component                Value               Date                       CHOL                     162                 07/31/2023                 TRIG                     69                  07/31/2023                 HDL                      66 (H)              07/31/2023                 LDL                      83                  07/31/2023              Kidney Cyst/Hematuria:  Did see urology, had benign w/u for hematuria w/cystoscopy, repeat BMP and u/a yearly and f/u prn. She does have some urinary frequency notable in the evenings, recent urine culture was neg for UTI.    Osteoporosis:  Last dexa scan a several years ago. Takes Fosamax weekly some weeks. Has been on it for the last 2 years at least.     COPD:  On Breo daily. Does not have wheeze or SOA very often, just when she gets sick with bronchitis or if she goes on a long walk. Uses alb prn but not very often, was not sure when to use it.    Arthritis:  She will have joint pain with rainy weather. She has pain in elbows, knees, shoulders, occ hand pain but not too bad, sometimes has knuckle swelling. Occ rt knee swelling, had sx on this knee previously. Takes tylenol pm and celebrex for pain.    Edema:  Has had some swelling in her legs more since she broke her leg and has been in a wheel chair. No pain in calf or redness. It does go down some  at night while sleeping then gets swollen again through the day. No swelling in hands or stomach.    Helping hands brings her to her Dr bladimir. Around the house she uses the walker, she does not leave the house other than in a wheel chair.  Right shoulder pain, worse since her fall.       The following portions of the patient's history were reviewed and updated as appropriate: allergies, current medications, past family history, past medical history, past social history, past surgical history and problem list.  No Known Allergies    Current Outpatient Medications:     acetaminophen (TYLENOL) 325 MG tablet, Take 2 tablets by mouth Every 4 (Four) Hours As Needed for Mild Pain. (Patient taking differently: Take 2 tablets by mouth Every 6 (Six) Hours As Needed for Mild Pain.), Disp: , Rfl:     albuterol sulfate  (90 Base) MCG/ACT inhaler, Inhale 2 puffs Every 4 (Four) Hours As Needed for Wheezing., Disp: 18 g, Rfl: 0    Breo Ellipta 100-25 MCG/ACT aerosol powder , Inhale 1 puff Daily., Disp: 60 each, Rfl: 5    celecoxib (CeleBREX) 200 MG capsule, Take 1 capsule by mouth Daily., Disp: 90 capsule, Rfl: 2    nystatin (MYCOSTATIN) 848980 UNIT/GM cream, , Disp: , Rfl:     pantoprazole (PROTONIX) 40 MG EC tablet, Take 1 tablet by mouth Daily., Disp: 90 tablet, Rfl: 2    simvastatin (ZOCOR) 40 MG tablet, Take 1 tablet by mouth Every Night., Disp: 90 tablet, Rfl: 3    alendronate (Fosamax) 70 MG tablet, Take 1 tablet by mouth Every 7 (Seven) Days., Disp: , Rfl:   New Medications Ordered This Visit   Medications    celecoxib (CeleBREX) 200 MG capsule     Sig: Take 1 capsule by mouth Daily.     Dispense:  90 capsule     Refill:  2    simvastatin (ZOCOR) 40 MG tablet     Sig: Take 1 tablet by mouth Every Night.     Dispense:  90 tablet     Refill:  3    Breo Ellipta 100-25 MCG/ACT aerosol powder      Sig: Inhale 1 puff Daily.     Dispense:  60 each     Refill:  5    pantoprazole (PROTONIX) 40 MG EC tablet     Sig: Take 1  "tablet by mouth Daily.     Dispense:  90 tablet     Refill:  2    albuterol sulfate  (90 Base) MCG/ACT inhaler     Sig: Inhale 2 puffs Every 4 (Four) Hours As Needed for Wheezing.     Dispense:  18 g     Refill:  0     Social History     Tobacco Use   Smoking Status Former    Packs/day: 0.50    Years: 40.00    Additional pack years: 0.00    Total pack years: 20.00    Types: Cigarettes    Start date:     Quit date:     Years since quittin.8   Smokeless Tobacco Never        Objective   Vital Signs:   Vitals:    10/30/23 0825   BP: 120/66   Pulse: 69   Resp: 16   Temp: 97.5 °F (36.4 °C)   TempSrc: Infrared   SpO2: 97%   Weight: 72.1 kg (159 lb)   Height: 157.5 cm (62.01\")      Body mass index is 29.07 kg/m².  Physical Exam   No LMP recorded. Patient is postmenopausal.         Result Review :                   Assessment and Plan    Diagnoses and all orders for this visit:    1. Influenza vaccine needed (Primary)  -     Fluzone High-Dose 65+yrs (5413-1190)    2. Generalized weakness  Assessment & Plan:  Stable, refer to PT    Orders:  -     Ambulatory Referral to Physical Therapy Evaluate and treat  -     Comprehensive Metabolic Panel; Future  -     CBC Auto Differential; Future    3. Chronic pain of both knees  -     Ambulatory Referral to Physical Therapy Evaluate and treat  -     celecoxib (CeleBREX) 200 MG capsule; Take 1 capsule by mouth Daily.  Dispense: 90 capsule; Refill: 2    4. Closed fracture of right wrist, initial encounter  Assessment & Plan:  F/u with ortho as dir, refer to PT    Orders:  -     Ambulatory Referral to Physical Therapy Evaluate and treat  -     celecoxib (CeleBREX) 200 MG capsule; Take 1 capsule by mouth Daily.  Dispense: 90 capsule; Refill: 2    5. Chronic right shoulder pain  -     Ambulatory Referral to Physical Therapy Evaluate and treat  -     celecoxib (CeleBREX) 200 MG capsule; Take 1 capsule by mouth Daily.  Dispense: 90 capsule; Refill: 2    6. Vitamin D " deficiency  Assessment & Plan:  Restart vit D supplement, check vit D    Orders:  -     Vitamin D,25-Hydroxy; Future    7. Hyperlipidemia, unspecified hyperlipidemia type  Assessment & Plan:  Lipid abnormalities are improving with treatment.  Nutritional counseling was provided. and Pharmacotherapy as ordered.  Lipids will be reassessed in 6 months. Cont Zocor    Orders:  -     Lipid Panel; Future  -     simvastatin (ZOCOR) 40 MG tablet; Take 1 tablet by mouth Every Night.  Dispense: 90 tablet; Refill: 3    8. Hypercalcemia  Assessment & Plan:  Recheck labs    Orders:  -     Comprehensive Metabolic Panel; Future  -     CBC Auto Differential; Future  -     PTH, Intact; Future  -     Vitamin D,25-Hydroxy; Future    9. GERD without esophagitis  Assessment & Plan:  Chronic, stable, cont protonix 40    Orders:  -     pantoprazole (PROTONIX) 40 MG EC tablet; Take 1 tablet by mouth Daily.  Dispense: 90 tablet; Refill: 2    10. Chronic obstructive pulmonary disease, unspecified COPD type  Assessment & Plan:  COPD is improving with treatment.  Continue current medications.  Reviewed to use Breo daily and albuterol only prn if needed.     Orders:  -     Breo Ellipta 100-25 MCG/ACT aerosol powder ; Inhale 1 puff Daily.  Dispense: 60 each; Refill: 5  -     albuterol sulfate  (90 Base) MCG/ACT inhaler; Inhale 2 puffs Every 4 (Four) Hours As Needed for Wheezing.  Dispense: 18 g; Refill: 0    11. Osteoporosis without current pathological fracture, unspecified osteoporosis type  -     Vitamin D,25-Hydroxy; Future        Follow Up   Return in about 6 months (around 4/30/2024) for Chronic Conditions, Routine Labs.    Follow up if symptoms worsen or persist or has new or concerning symptoms, go to ER for severe symptoms.   Reviewed common medication effects and side effects and advised to report side effects immediately.  Encouraged medication compliance and the importance of keeping scheduled follow up appointments with me and  any other providers.  If a referral was made please contact our office if you have not heard about an appointment in the next 2 weeks.   If labs or images are ordered we will contact you with the results within the next week.  If you have not heard from us after a week please call our office to inquire about the results.   Patient was given instructions and counseling regarding her condition or for health maintenance advice. Please see specific information pulled into the AVS if appropriate.     Whitley Treadwell PA-C    * Please note that portions of this note were completed with a voice recognition program.

## 2023-11-05 DIAGNOSIS — E21.3 HYPERPARATHYROIDISM: Primary | ICD-10-CM

## 2023-11-05 DIAGNOSIS — M81.0 OSTEOPOROSIS WITHOUT CURRENT PATHOLOGICAL FRACTURE, UNSPECIFIED OSTEOPOROSIS TYPE: ICD-10-CM

## 2023-11-06 ENCOUNTER — TELEPHONE (OUTPATIENT)
Dept: INTERNAL MEDICINE | Facility: CLINIC | Age: 78
End: 2023-11-06
Payer: MEDICARE

## 2023-11-06 ENCOUNTER — TREATMENT (OUTPATIENT)
Dept: PHYSICAL THERAPY | Facility: CLINIC | Age: 78
End: 2023-11-06
Payer: MEDICARE

## 2023-11-06 DIAGNOSIS — R29.898 WEAKNESS OF BOTH LOWER EXTREMITIES: ICD-10-CM

## 2023-11-06 DIAGNOSIS — G89.29 CHRONIC RIGHT SHOULDER PAIN: ICD-10-CM

## 2023-11-06 DIAGNOSIS — M25.511 CHRONIC RIGHT SHOULDER PAIN: ICD-10-CM

## 2023-11-06 DIAGNOSIS — S62.101D CLOSED FRACTURE OF RIGHT WRIST WITH ROUTINE HEALING, SUBSEQUENT ENCOUNTER: Primary | ICD-10-CM

## 2023-11-06 PROCEDURE — 97110 THERAPEUTIC EXERCISES: CPT | Performed by: PHYSICAL THERAPIST

## 2023-11-06 PROCEDURE — 97162 PT EVAL MOD COMPLEX 30 MIN: CPT | Performed by: PHYSICAL THERAPIST

## 2023-11-06 NOTE — PROGRESS NOTES
Physical Therapy Initial Evaluation and Plan of Care  [address] [facility] [address]  [place] [address]    Patient: Jackie Cordova   : 1945  Diagnosis/ICD-10 Code:  No primary diagnosis found.  Referring practitioner: Whitley Treadwell PA-C  Date of Initial Visit: 2023  Today's Date: 2023  Patient seen for Visit count could not be calculated. Make sure you are using a visit which is associated with an episode. session         Visit Diagnoses:  No diagnosis found.      Subjective Questionnaire: LEFS: 11; QuickDASH: 58.33      Subjective Evaluation    History of Present Illness  Mechanism of injury: Pt is a 78 year old female presenting to the clinic with a fracture of the right wrist, pain in her right shoulder, and a fracture of the right femur. Her right femur was fractured in 2023. In September, she fractured her right distal radius and ulna in the parking lot trying to get out of the car. She has been also experiencing more right shoulder pain since the fall as well. She had chronic pain in the shoulder prior to the fall. Her doctor has told her that her wrist fracture has healed, but it is not quite straight. He told her it will not be straight unless she undergoes surgery. Her sister comes to the house to help her get in and out of the shower. She dresses herself and cooks for herself now. She would like to work on strength. She has wrist pain at night. She has also noticed some tingling. Sometimes using her walker causes tingling in her thumb. Her leg does not seem to bother her anymore.     Quality of life: excellent    Pain  Current pain ratin  At worst pain ratin  Quality: needle-like  Aggravating factors: ambulation, squatting, movement and lifting  Progression: improved    Patient Goals  Patient goals for therapy: decreased pain, increased motion, increased strength, independence with ADLs/IADLs, return to sport/leisure activities, decreased edema and improved  balance           Objective          Active Range of Motion   Left Shoulder   Flexion: 90 degrees   Abduction: 85 degrees     Right Shoulder   Flexion: 90 degrees with pain  Abduction: 85 degrees with pain    Left Wrist   Wrist flexion: 60 degrees   Wrist extension: 58 degrees     Right Wrist   Wrist flexion: 44 degrees   Wrist extension: 40 degrees   Left Hip   Flexion: 70 degrees     Right Hip   Flexion: 58 degrees   Left Knee   Flexion: 110 degrees   Extension: 3 degrees     Right Knee   Flexion: 82 degrees   Extension: 4 degrees     Strength/Myotome Testing     Left Shoulder     Planes of Motion   External rotation at 0°: 3+   Internal rotation at 0°: 4-     Right Shoulder     Planes of Motion   External rotation at 0°: 3+   Internal rotation at 0°: 4-     Left Elbow   Flexion: 4+  Extension: 4    Right Elbow   Flexion: 4+  Extension: 4    Left Wrist/Hand   Wrist extension: 4-  Wrist flexion: 4     (2nd hand position)   Left  strength (2nd hand position) 14 lbs    Right Wrist/Hand      (2nd hand position)   Right  strength (2nd hand position) 2 lbs    Left Hip   Planes of Motion   Flexion: 3+    Right Hip   Planes of Motion   Flexion: 3    Left Knee   Flexion: 4  Extension: 4    Right Knee   Flexion: 4  Extension: 4    Left Ankle/Foot   Dorsiflexion: 4    Right Ankle/Foot   Dorsiflexion: 4    Functional Assessment     Comments  TU sec with RW          Assessment & Plan       Assessment  Impairments: abnormal gait, abnormal or restricted ROM, activity intolerance, impaired balance, impaired physical strength, lacks appropriate home exercise program and pain with function   Functional limitations: carrying objects, lifting, walking, pulling, pushing, uncomfortable because of pain, moving in bed, reaching overhead and unable to perform repetitive tasks   Assessment details: Pt is a 78 year old female presenting to the clinic after a right wrist fracture as a result of a fall. She is also  experiencing chronic right shoulder pain. She also has a previous right femur fracture that occurred in March. She demonstrates decreased wrist, shoulder, knee and hip AROM, decreased UE and LE strength, and an abnormal TUG score. Her impairments are limiting her ability to walk long distances and transfer safely. Pt would benefit from skilled PT to address her impairments so she can reach her long term goals.  Prognosis: good    Goals  Plan Goals: SHORT TERM GOALS:     2 weeks  1. Pt I w/ HEP  2. Pt to demonstrate PROM of the right wrist to WFL to improve ability to perform ADL's  3. Pt to demonstrate TUG in 30 sec or less to show increased functional mobility    LONG TERM GOALS:   6 weeks  1. Pt to demonstrate 4/5 bilateral UE and LE strength to improve stability with functional activities  2. Pt to perform the TUG in 15 sec or less to show increased functional mobility  4. Pt to tolerate 60 minutes continuous activity in the clinic without increase in pain      Plan  Therapy options: will be seen for skilled therapy services  Planned modality interventions: cryotherapy, dry needling, electrical stimulation/Russian stimulation, high voltage pulsed current (pain management), TENS, thermotherapy (hydrocollator packs) and ultrasound  Planned therapy interventions: manual therapy, neuromuscular re-education, postural training, soft tissue mobilization, spinal/joint mobilization, strengthening, stretching, therapeutic activities, home exercise program, gait training, body mechanics training, balance/weight-bearing training, fine motor coordination training, flexibility and functional ROM exercises  Duration in visits: 2  Duration in weeks: 12  Treatment plan discussed with: patient      History # of Personal Factors and/or Comorbidities: MODERATE (1-2)  Examination of Body System(s): # of elements: MODERATE (3)  Clinical Presentation: STABLE   Clinical Decision Making: MODERATE      Timed:         Manual Therapy:          mins  12103;     Therapeutic Exercise:    15     mins  72561;     Neuromuscular Dima:        mins  03969;    Therapeutic Activity:          mins  62401;     Gait Training:           mins  49056;     Ultrasound:          mins  19777;    Ionto                                   mins   36715  Self Care                            mins   08459  Canalith Repos         mins 85183      Un-Timed:  Electrical Stimulation:         mins  52353 ( );  Dry Needling          mins self-pay  Traction          mins 01765  Low Eval          Mins  67851  Mod Eval     30     Mins  85529  High Eval                            Mins  37934        Timed Treatment:   15   mins   Total Treatment:     45   mins          PT: Mare Arceo PT   License Number: [id number]  Electronically signed by Mare Arceo PT, 11/06/23, 2:41 PM EST    Certification Period: 11/6/2023 thru 2/3/2024  I certify that the therapy services are furnished while this patient is under my care.  The services outlined above are required by this patient, and will be reviewed every 90 days.         Physician Signature:__________________________________________________    PHYSICIAN: Whitley Treadwell PA-C  NPI: [id number]                                      DATE:      Please sign and return via fax to [email or web address] . Thank you, [facility].

## 2023-11-06 NOTE — TELEPHONE ENCOUNTER
LENORA for t to return call with office #.     HUB - please relay following message - Your vitamin D is slightly low, make sure you are taking a daily vitamin D supplement. Your parathyroid hormone was a little elevated. This can cause elevated calcium in your blood and increase fracture risk. I am going to get you in with an endocrinologist for evaluation to see if this is something we should continue to watch or if there are any other things we can do to decrease your fracture risk.   The rest of your labs looked good and stable from previous.

## 2023-11-06 NOTE — TELEPHONE ENCOUNTER
----- Message from Whitley Treadwell PA-C sent at 11/5/2023 10:41 PM EST -----  Your vitamin D is slightly low, make sure you are taking a daily vitamin D supplement. Your parathyroid hormone was a little elevated. This can cause elevated calcium in your blood and increase fracture risk. I am going to get you in with an endocrinologist for evaluation to see if this is something we should continue to watch or if there are any other things we can do to decrease your fracture risk.   The rest of your labs looked good and stable from previous.

## 2023-11-06 NOTE — TELEPHONE ENCOUNTER
HUB PROVIDED THE RELAY MESSAGE FROM THE OFFICE    PATIENT VOICED UNDERSTANDING AND HAS NO FURTHER QUESTIONS AT THIS TIME    ADDITIONAL INFORMATION:

## 2023-11-13 ENCOUNTER — TREATMENT (OUTPATIENT)
Dept: PHYSICAL THERAPY | Facility: CLINIC | Age: 78
End: 2023-11-13
Payer: MEDICARE

## 2023-11-13 DIAGNOSIS — M25.511 CHRONIC RIGHT SHOULDER PAIN: ICD-10-CM

## 2023-11-13 DIAGNOSIS — S62.101D CLOSED FRACTURE OF RIGHT WRIST WITH ROUTINE HEALING, SUBSEQUENT ENCOUNTER: Primary | ICD-10-CM

## 2023-11-13 DIAGNOSIS — G89.29 CHRONIC RIGHT SHOULDER PAIN: ICD-10-CM

## 2023-11-13 DIAGNOSIS — R29.898 WEAKNESS OF BOTH LOWER EXTREMITIES: ICD-10-CM

## 2023-11-13 PROCEDURE — 97110 THERAPEUTIC EXERCISES: CPT | Performed by: PHYSICAL THERAPIST

## 2023-11-13 PROCEDURE — 97112 NEUROMUSCULAR REEDUCATION: CPT | Performed by: PHYSICAL THERAPIST

## 2023-11-13 PROCEDURE — 97530 THERAPEUTIC ACTIVITIES: CPT | Performed by: PHYSICAL THERAPIST

## 2023-11-13 NOTE — PROGRESS NOTES
Physical Therapy Daily Treatment Note                [address] [facility] [address] [place] [address]       Patient: Jackie Cordova   : 1945  Diagnosis/ICD-10 Code:  Closed fracture of right wrist with routine healing, subsequent encounter [S62.101D]  Referring practitioner: Whitley Treadwell PA-C  Date of Initial Visit: Type: THERAPY  Noted: 2023  Today's Date: 2023  Patient seen for 2 sessions             Subjective   Jackie Cordova reports: trying to get back into the exercises. Very cautious when she walks because she doesn't want to fall again which makes her shuffle.     Objective          Active Range of Motion     Right Wrist   Wrist flexion: 45 degrees   Wrist extension: 50 degrees     Swelling     Right Wrist/Hand     Additional Swelling Details  Noted edema at the ulnar and radial sides of the right wrist.      See Exercise, Manual, and Modality Logs for complete treatment.       Assessment/Plan  Pt had difficulty keeping the right elbow adducted during ER AAROM needing tactile assist. Unable to perform sit to stand without bilateral UE assist. Added a ball between the knees during sit to stand exercise to remind patient to keep knees apart. She needed verbal cueing to pick feet up during ambulation to decrease shuffling.   Progress per Plan of Care and Progress strengthening /stabilization /functional activity           Timed:  Manual Therapy:         mins  41746;  Therapeutic Exercise:    20     mins  64448;     Neuromuscular Dima:    12    mins  49437;    Therapeutic Activity:     15     mins  34250;     Gait Training:           mins  00940;     Ultrasound:          mins  98941;    Electrical Stimulation:         mins  63006;  Iontophoresis          mins  74319    Untimed:  Electrical Stimulation:         mins  51686 ( );  Mechanical Traction:         mins  04222;   Fluidotherapy          mins  03223    Timed Treatment:   47   mins   Total Treatment:     47    mins        Juanita Osborne, PTA  Physical Therapist Assistant

## 2023-11-16 ENCOUNTER — TREATMENT (OUTPATIENT)
Dept: PHYSICAL THERAPY | Facility: CLINIC | Age: 78
End: 2023-11-16
Payer: MEDICARE

## 2023-11-16 DIAGNOSIS — G89.29 CHRONIC RIGHT SHOULDER PAIN: ICD-10-CM

## 2023-11-16 DIAGNOSIS — R29.898 WEAKNESS OF BOTH LOWER EXTREMITIES: ICD-10-CM

## 2023-11-16 DIAGNOSIS — S62.101D CLOSED FRACTURE OF RIGHT WRIST WITH ROUTINE HEALING, SUBSEQUENT ENCOUNTER: Primary | ICD-10-CM

## 2023-11-16 DIAGNOSIS — M25.511 CHRONIC RIGHT SHOULDER PAIN: ICD-10-CM

## 2023-11-16 NOTE — PROGRESS NOTES
Physical Therapy Daily Progress Note    Subjective   Jackie Cordova reports: she was sore after her first visit.      Objective   See Exercise, Manual, and Modality Logs for complete treatment.       Assessment/Plan  Pt tolerated treatment well. She requires cues for left foot clearance while ambulating. Pt is progressing well and would benefit from continued skilled PT.     Progress per Plan of Care           Manual Therapy:         mins  52475;  Therapeutic Exercise:    15     mins  89924;     Neuromuscular Dima:        mins  63674;    Therapeutic Activity:     10     mins  41431;     Gait Training:          mins  27191;     Ultrasound:          mins  04378;    Electrical Stimulation:         mins  95687 ( );  E-Stim Attended:         mins  56327  Iontophoresis          mins 00064   Traction          mins  09765  Fluidotherapy          mins  86035  Dry Needling          mins self-pay - No Charge  Paraffin          mins  70556    Timed Treatment:   25   mins   Total Treatment:     46   mins    Mare Arceo, PT, DPT  Physical Therapist

## 2023-11-21 ENCOUNTER — TREATMENT (OUTPATIENT)
Dept: PHYSICAL THERAPY | Facility: CLINIC | Age: 78
End: 2023-11-21
Payer: MEDICARE

## 2023-11-21 DIAGNOSIS — S62.101D CLOSED FRACTURE OF RIGHT WRIST WITH ROUTINE HEALING, SUBSEQUENT ENCOUNTER: Primary | ICD-10-CM

## 2023-11-21 DIAGNOSIS — G89.29 CHRONIC RIGHT SHOULDER PAIN: ICD-10-CM

## 2023-11-21 DIAGNOSIS — M25.511 CHRONIC RIGHT SHOULDER PAIN: ICD-10-CM

## 2023-11-21 DIAGNOSIS — R29.898 WEAKNESS OF BOTH LOWER EXTREMITIES: ICD-10-CM

## 2023-11-21 NOTE — PROGRESS NOTES
Physical Therapy Daily Progress Note    Subjective   Jackie Cordova reports: she still gets sore after PT, but not to the point she can't function the next day.      Objective   See Exercise, Manual, and Modality Logs for complete treatment.       Assessment/Plan  Pt tolerated treatment well. She continues to require cues for left toe clearance. Initiated standing marches with cues to not allow knee valgus on the right. Pt would benefit from continued skilled PT.    Progress per Plan of Care           Manual Therapy:         mins  29951;  Therapeutic Exercise:    15     mins  08539;     Neuromuscular Dima:        mins  68180;    Therapeutic Activity:     10     mins  82494;     Gait Training:           mins  18318;     Ultrasound:          mins  54600;    Electrical Stimulation:         mins  31540 ( );  E-Stim Attended:         mins  47927  Iontophoresis          mins 31353   Traction          mins  48818  Fluidotherapy          mins  55796  Dry Needling          mins self-pay - No Charge  Paraffin          mins  42040    Timed Treatment:   25   mins   Total Treatment:     56   mins    Mare Arceo, PT, DPT  Physical Therapist

## 2023-11-28 ENCOUNTER — TREATMENT (OUTPATIENT)
Dept: PHYSICAL THERAPY | Facility: CLINIC | Age: 78
End: 2023-11-28
Payer: MEDICARE

## 2023-11-28 DIAGNOSIS — R29.898 WEAKNESS OF BOTH LOWER EXTREMITIES: ICD-10-CM

## 2023-11-28 DIAGNOSIS — G89.29 CHRONIC RIGHT SHOULDER PAIN: ICD-10-CM

## 2023-11-28 DIAGNOSIS — M25.511 CHRONIC RIGHT SHOULDER PAIN: ICD-10-CM

## 2023-11-28 DIAGNOSIS — S62.101D CLOSED FRACTURE OF RIGHT WRIST WITH ROUTINE HEALING, SUBSEQUENT ENCOUNTER: Primary | ICD-10-CM

## 2023-11-28 NOTE — PROGRESS NOTES
Physical Therapy Daily Treatment Note                [address] [facility] [address] [place] [address]       Patient: Jackie Cordova   : 1945  Diagnosis/ICD-10 Code:  Closed fracture of right wrist with routine healing, subsequent encounter [S62.101D]  Referring practitioner: Whitley Treadwell PA-C  Date of Initial Visit: Type: THERAPY  Noted: 2023  Today's Date: 2023  Patient seen for 5 sessions             Subjective   Jackie Cordova reports: seem more stiff today than usual all over.     Objective   See Exercise, Manual, and Modality Logs for complete treatment.       Assessment/Plan  Added wrist ROM with OP with improve ROM as she is still limited in supination, flexion and extension. Needed tactile cueing to keep her right LE from internally rotating when performing marches. Able to improve her ability to stay in neutral using isometrics and blocking techniques at the knee. With repetition, able to remove the block at the knee and perform correctly hip ER iso. Pt unable to get up out of a chair without UE assist.   Progress per Plan of Care and Progress strengthening /stabilization /functional activity           Timed:  Manual Therapy:         mins  79800;  Therapeutic Exercise:    30     mins  64863;     Neuromuscular Dima:    15    mins  30037;    Therapeutic Activity:     10     mins  64623;     Gait Training:           mins  50074;     Ultrasound:          mins  61694;    Electrical Stimulation:         mins  72930;  Iontophoresis          mins  15015    Untimed:  Electrical Stimulation:         mins  99797 ( );  Mechanical Traction:         mins  45508;   Fluidotherapy          mins  14070    Timed Treatment:   55   mins   Total Treatment:     55   mins        Juanita Osborne PTA  Physical Therapist Assistant

## 2023-12-04 ENCOUNTER — HOSPITAL ENCOUNTER (OUTPATIENT)
Dept: MAMMOGRAPHY | Facility: HOSPITAL | Age: 78
Discharge: HOME | End: 2023-12-04
Admitting: PHYSICIAN ASSISTANT
Payer: MEDICARE

## 2023-12-04 DIAGNOSIS — Z12.31 SCREENING MAMMOGRAM FOR BREAST CANCER: ICD-10-CM

## 2023-12-04 PROCEDURE — 77063 BREAST TOMOSYNTHESIS BI: CPT

## 2023-12-04 PROCEDURE — 77067 SCR MAMMO BI INCL CAD: CPT

## 2023-12-05 ENCOUNTER — TREATMENT (OUTPATIENT)
Dept: PHYSICAL THERAPY | Facility: CLINIC | Age: 78
End: 2023-12-05
Payer: MEDICARE

## 2023-12-05 DIAGNOSIS — M25.511 CHRONIC RIGHT SHOULDER PAIN: ICD-10-CM

## 2023-12-05 DIAGNOSIS — G89.29 CHRONIC RIGHT SHOULDER PAIN: ICD-10-CM

## 2023-12-05 DIAGNOSIS — R29.898 WEAKNESS OF BOTH LOWER EXTREMITIES: ICD-10-CM

## 2023-12-05 DIAGNOSIS — S62.101D CLOSED FRACTURE OF RIGHT WRIST WITH ROUTINE HEALING, SUBSEQUENT ENCOUNTER: Primary | ICD-10-CM

## 2023-12-05 PROCEDURE — 77067 SCR MAMMO BI INCL CAD: CPT | Performed by: RADIOLOGY

## 2023-12-05 PROCEDURE — 77063 BREAST TOMOSYNTHESIS BI: CPT | Performed by: RADIOLOGY

## 2023-12-05 NOTE — PROGRESS NOTES
Physical Therapy Daily Progress Note    Subjective   Jackie Cordova reports: she is doing okay. She needed help to get out of her chair at a restaurant yesterday.      Objective   See Exercise, Manual, and Modality Logs for complete treatment.       Assessment/Plan  Pt tolerated treatment well. She requires cues to not allow knee valgus when performing squats or sit to stands. Pt would benefit from continued skilled PT.     Progress per Plan of Care           Manual Therapy:         mins  59788;  Therapeutic Exercise:    15     mins  87795;     Neuromuscular Dima:        mins  97587;    Therapeutic Activity:    10      mins  41033;     Gait Training:           mins  37342;     Ultrasound:          mins  20987;    Electrical Stimulation:         mins  37093 ( );  E-Stim Attended:         mins  40897  Iontophoresis          mins 49484   Traction          mins  59890  Fluidotherapy          mins  83055  Dry Needling          mins self-pay - No Charge  Paraffin          mins  35151    Timed Treatment:   25   mins   Total Treatment:     48   mins    Mare Arceo, PT, DPT  Physical Therapist

## 2023-12-06 ENCOUNTER — TELEPHONE (OUTPATIENT)
Dept: INTERNAL MEDICINE | Facility: CLINIC | Age: 78
End: 2023-12-06
Payer: MEDICARE

## 2023-12-06 NOTE — TELEPHONE ENCOUNTER
----- Message from Whitley Treadwell PA-C sent at 12/6/2023  4:53 PM EST -----  Mammogram was normal

## 2023-12-06 NOTE — TELEPHONE ENCOUNTER
Attempted to call pt, lft vm with cb number to inform   Message below     OK FOR HUB TO RELAY MESSAGE     - Message from Whitley Treadwell PA-C sent at 12/6/2023  4:53 PM EST -----  Mammogram was normal

## 2023-12-07 ENCOUNTER — TREATMENT (OUTPATIENT)
Dept: PHYSICAL THERAPY | Facility: CLINIC | Age: 78
End: 2023-12-07
Payer: MEDICARE

## 2023-12-07 DIAGNOSIS — S62.101D CLOSED FRACTURE OF RIGHT WRIST WITH ROUTINE HEALING, SUBSEQUENT ENCOUNTER: Primary | ICD-10-CM

## 2023-12-07 DIAGNOSIS — R29.898 WEAKNESS OF BOTH LOWER EXTREMITIES: ICD-10-CM

## 2023-12-07 DIAGNOSIS — G89.29 CHRONIC RIGHT SHOULDER PAIN: ICD-10-CM

## 2023-12-07 DIAGNOSIS — M25.511 CHRONIC RIGHT SHOULDER PAIN: ICD-10-CM

## 2023-12-07 NOTE — PROGRESS NOTES
Physical Therapy Daily Progress Note    Subjective   Jackie Cordova reports: she is doing well. She is sore after PT sometimes.      Objective   See Exercise, Manual, and Modality Logs for complete treatment.       Assessment/Plan  Pt tolerated treatment well. Initiated cone walking with cues to not allow knee valgus while lifting her foot off the floor. Pt would benefit from continued skilled PT.     Progress per Plan of Care           Manual Therapy:         mins  25752;  Therapeutic Exercise:    15     mins  91793;     Neuromuscular Dima:    10    mins  35190;    Therapeutic Activity:          mins  57924;     Gait Training:           mins  98137;     Ultrasound:          mins  09477;    Electrical Stimulation:         mins  00162 ( );  E-Stim Attended:         mins  41239  Iontophoresis         mins 16982   Traction          mins  77853  Fluidotherapy          mins  75681  Dry Needling          mins self-pay - No Charge  Paraffin          mins  21464    Timed Treatment:   25   mins   Total Treatment:     49   mins    Mare Arceo, PT, DPT  Physical Therapist

## 2023-12-11 ENCOUNTER — TREATMENT (OUTPATIENT)
Dept: PHYSICAL THERAPY | Facility: CLINIC | Age: 78
End: 2023-12-11
Payer: MEDICARE

## 2023-12-11 DIAGNOSIS — G89.29 CHRONIC RIGHT SHOULDER PAIN: ICD-10-CM

## 2023-12-11 DIAGNOSIS — M25.511 CHRONIC RIGHT SHOULDER PAIN: ICD-10-CM

## 2023-12-11 DIAGNOSIS — S62.101D CLOSED FRACTURE OF RIGHT WRIST WITH ROUTINE HEALING, SUBSEQUENT ENCOUNTER: Primary | ICD-10-CM

## 2023-12-11 DIAGNOSIS — R29.898 WEAKNESS OF BOTH LOWER EXTREMITIES: ICD-10-CM

## 2023-12-11 NOTE — PROGRESS NOTES
Physical Therapy Daily Progress Note    Subjective   Jackie Cordova reports: she feels stiff today but is doing fine.      Objective   See Exercise, Manual, and Modality Logs for complete treatment.       Assessment/Plan  Pt tolerated treatment well. She required cues for foot clearance and heel strike while ambulating. She also requires cues to prevent knee valgus while squatting. Pt would benefit from continued skilled PT.     Progress per Plan of Care           Manual Therapy:         mins  08948;  Therapeutic Exercise:         mins  58910;     Neuromuscular Dima:    10    mins  65040;    Therapeutic Activity:     13     mins  57549;     Gait Training:           mins  97420;     Ultrasound:          mins  99001;    Electrical Stimulation:         mins  65744 ( );  E-Stim Attended:         mins  71290  Iontophoresis          mins 02727   Traction          mins  64798  Fluidotherapy          mins  39292  Dry Needling          mins self-pay - No Charge  Paraffin          mins  50459    Timed Treatment:   23   mins   Total Treatment:     58   mins    Mare Arceo, PT, DPT  Physical Therapist

## 2023-12-14 ENCOUNTER — TREATMENT (OUTPATIENT)
Dept: PHYSICAL THERAPY | Facility: CLINIC | Age: 78
End: 2023-12-14
Payer: MEDICARE

## 2023-12-14 DIAGNOSIS — M25.511 CHRONIC RIGHT SHOULDER PAIN: ICD-10-CM

## 2023-12-14 DIAGNOSIS — S62.101D CLOSED FRACTURE OF RIGHT WRIST WITH ROUTINE HEALING, SUBSEQUENT ENCOUNTER: Primary | ICD-10-CM

## 2023-12-14 DIAGNOSIS — G89.29 CHRONIC RIGHT SHOULDER PAIN: ICD-10-CM

## 2023-12-14 DIAGNOSIS — R29.898 WEAKNESS OF BOTH LOWER EXTREMITIES: ICD-10-CM

## 2023-12-14 NOTE — PROGRESS NOTES
Physical Therapy Daily Progress Note    Subjective   Jackie Cordova reports: she feels about 20% better since starting PT. Her right wrist really bothers her at night. She has ordered a set of stress balls to squeeze at home.    Today's Pain ratin/10  Worst: 8/10    Objective          Active Range of Motion     Right Shoulder   Flexion: 90 degrees   Abduction: 80 degrees     Right Wrist   Wrist flexion: 40 degrees   Wrist extension: 42 degrees     Strength/Myotome Testing     Left Shoulder     Planes of Motion   Flexion: 4-   Abduction: 4-   External rotation at 0°: 4-   Internal rotation at 0°: 4     Right Shoulder     Planes of Motion   Flexion: 3+   Abduction: 3+   External rotation at 0°: 4-   Internal rotation at 0°: 4     Left Elbow   Flexion: 4+  Extension: 4    Right Elbow   Flexion: 4+  Extension: 4    Left Wrist/Hand   Wrist extension: 4  Wrist flexion: 4     (2nd hand position)   Left  strength (2nd hand position) 14 lbs    Right Wrist/Hand   Wrist extension: 4-  Wrist flexion: 4-     (2nd hand position)   Right  strength (2nd hand position) 2 lbs    Left Hip   Planes of Motion   Flexion: 4-    Right Hip   Planes of Motion   Flexion: 3+    Left Knee   Flexion: 4  Extension: 4    Right Knee   Flexion: 4  Extension: 4    Left Ankle/Foot   Dorsiflexion: 4    Right Ankle/Foot   Dorsiflexion: 4    Functional Assessment     Comments  TU sec with RW      See Exercise, Manual, and Modality Logs for complete treatment.       Assessment/Plan  Pt tolerated treatment well. Her TUG score has improved. Her LE strength has improved slightly. Her  strength and wrist AROM is about the same. She has met STG 1 and is making progress towards her remaining goals. Pt would benefit from continued skilled PT.    Progress per Plan of Care  2x per week for 8 weeks         Manual Therapy:         mins  28200;  Therapeutic Exercise:    15     mins  87433;     Neuromuscular Dima:    15    mins  11953;     Therapeutic Activity:     8     mins  41438;     Gait Training:           mins  53296;     Ultrasound:          mins  78088;    Electrical Stimulation:         mins  49198 ( );  E-Stim Attended:         mins  80065  Iontophoresis          mins 52826   Traction          mins  44398  Fluidotherapy          mins  41661  Dry Needling          mins self-pay - No Charge  Paraffin          mins  04092    Timed Treatment:   38   mins   Total Treatment:     57   mins    Mare Arceo, PT, DPT  Physical Therapist

## 2023-12-18 ENCOUNTER — TREATMENT (OUTPATIENT)
Dept: PHYSICAL THERAPY | Facility: CLINIC | Age: 78
End: 2023-12-18
Payer: MEDICARE

## 2023-12-18 DIAGNOSIS — S62.101D CLOSED FRACTURE OF RIGHT WRIST WITH ROUTINE HEALING, SUBSEQUENT ENCOUNTER: Primary | ICD-10-CM

## 2023-12-18 DIAGNOSIS — R29.898 WEAKNESS OF BOTH LOWER EXTREMITIES: ICD-10-CM

## 2023-12-18 DIAGNOSIS — G89.29 CHRONIC RIGHT SHOULDER PAIN: ICD-10-CM

## 2023-12-18 DIAGNOSIS — M25.511 CHRONIC RIGHT SHOULDER PAIN: ICD-10-CM

## 2023-12-18 NOTE — PROGRESS NOTES
Physical Therapy Daily Progress Note    Subjective   Jackie Cordova reports: she has been using the stress ball at home but it makes her fingers sore.      Objective   See Exercise, Manual, and Modality Logs for complete treatment.       Assessment/Plan  Pt tolerated treatment well. She was encouraged to take frequent rest breaks with the stress ball for now until she builds more strength. She is progressing well and would benefit from continued skilled PT.     Progress per Plan of Care           Manual Therapy:         mins  10897;  Therapeutic Exercise:    13     mins  63920;     Neuromuscular Dima:    10    mins  38351;    Therapeutic Activity:          mins  07953;     Gait Training:           mins  13136;     Ultrasound:          mins  48270;    Electrical Stimulation:         mins  05333 ( );  E-Stim Attended:         mins  42474  Iontophoresis          mins 27351   Traction          mins  89453  Fluidotherapy          mins  08185  Dry Needling          mins self-pay - No Charge  Paraffin          mins  32723    Timed Treatment:   23   mins   Total Treatment:     57   mins    Mare Arceo, PT, DPT  Physical Therapist

## 2023-12-21 ENCOUNTER — TREATMENT (OUTPATIENT)
Dept: PHYSICAL THERAPY | Facility: CLINIC | Age: 78
End: 2023-12-21
Payer: MEDICARE

## 2023-12-21 DIAGNOSIS — S62.101D CLOSED FRACTURE OF RIGHT WRIST WITH ROUTINE HEALING, SUBSEQUENT ENCOUNTER: Primary | ICD-10-CM

## 2023-12-21 DIAGNOSIS — G89.29 CHRONIC RIGHT SHOULDER PAIN: ICD-10-CM

## 2023-12-21 DIAGNOSIS — R29.898 WEAKNESS OF BOTH LOWER EXTREMITIES: ICD-10-CM

## 2023-12-21 DIAGNOSIS — M25.511 CHRONIC RIGHT SHOULDER PAIN: ICD-10-CM

## 2023-12-28 ENCOUNTER — TREATMENT (OUTPATIENT)
Dept: PHYSICAL THERAPY | Facility: CLINIC | Age: 78
End: 2023-12-28
Payer: MEDICARE

## 2023-12-28 DIAGNOSIS — M25.511 CHRONIC RIGHT SHOULDER PAIN: ICD-10-CM

## 2023-12-28 DIAGNOSIS — R29.898 WEAKNESS OF BOTH LOWER EXTREMITIES: ICD-10-CM

## 2023-12-28 DIAGNOSIS — G89.29 CHRONIC RIGHT SHOULDER PAIN: ICD-10-CM

## 2023-12-28 DIAGNOSIS — S62.101D CLOSED FRACTURE OF RIGHT WRIST WITH ROUTINE HEALING, SUBSEQUENT ENCOUNTER: Primary | ICD-10-CM

## 2023-12-28 NOTE — PROGRESS NOTES
Physical Therapy Daily Progress Note    Subjective   Jackie Cordova reports: she has been sore all over since yesterday and she is not sure why.      Objective   See Exercise, Manual, and Modality Logs for complete treatment.       Assessment/Plan  Pt tolerated treatment well. Some exercises were cut out today due to increased soreness. She required cues for bilateral foot clearance while ambulating. Pt would benefit from continued skilled PT.    Progress per Plan of Care           Manual Therapy:         mins  31247;  Therapeutic Exercise:    13     mins  96847;     Neuromuscular Dima:        mins  67472;    Therapeutic Activity:     10     mins  10008;     Gait Training:           mins  33875;     Ultrasound:          mins  12485;    Electrical Stimulation:         mins  42910 ( );  E-Stim Attended:         mins  74322  Iontophoresis          mins 55412   Traction          mins  27630  Fluidotherapy          mins  58417  Dry Needling          mins self-pay - No Charge  Paraffin          mins  77414    Timed Treatment:   23   mins   Total Treatment:     54   mins    Mare Arceo, PT, DPT  Physical Therapist

## 2024-01-02 ENCOUNTER — TREATMENT (OUTPATIENT)
Dept: PHYSICAL THERAPY | Facility: CLINIC | Age: 79
End: 2024-01-02
Payer: MEDICARE

## 2024-01-02 DIAGNOSIS — S62.101D CLOSED FRACTURE OF RIGHT WRIST WITH ROUTINE HEALING, SUBSEQUENT ENCOUNTER: Primary | ICD-10-CM

## 2024-01-02 DIAGNOSIS — G89.29 CHRONIC RIGHT SHOULDER PAIN: ICD-10-CM

## 2024-01-02 DIAGNOSIS — M25.511 CHRONIC RIGHT SHOULDER PAIN: ICD-10-CM

## 2024-01-02 DIAGNOSIS — R29.898 WEAKNESS OF BOTH LOWER EXTREMITIES: ICD-10-CM

## 2024-01-02 NOTE — PROGRESS NOTES
Physical Therapy Daily Progress Note    Subjective   Jackie Cordova reports: her left knee is still bothersome. She notices she is more sore and achey when it's cold out.      Objective   See Exercise, Manual, and Modality Logs for complete treatment.       Assessment/Plan  Pt tolerated treatment well. She required cues for left foot clearance while ambulating. She continues to have difficulty with fully extending her knees in standing. Pt would benefit from continued skilled PT.    Progress per Plan of Care           Manual Therapy:         mins  90867;  Therapeutic Exercise:    23     mins  72218;     Neuromuscular Dima:    15    mins  03020;    Therapeutic Activity:          mins  96242;     Gait Training:           mins  54325;     Ultrasound:          mins  64800;    Electrical Stimulation:         mins  75283 ( );  E-Stim Attended:         mins  44233  Iontophoresis          mins 27199   Traction          mins  38944  Fluidotherapy          mins  50856  Dry Needling          mins self-pay - No Charge  Paraffin          mins  93175    Timed Treatment:   38   mins   Total Treatment:     50   mins    Mare Arceo, PT, DPT  Physical Therapist

## 2024-01-09 ENCOUNTER — TREATMENT (OUTPATIENT)
Dept: PHYSICAL THERAPY | Facility: CLINIC | Age: 79
End: 2024-01-09
Payer: MEDICARE

## 2024-01-09 DIAGNOSIS — R29.898 WEAKNESS OF BOTH LOWER EXTREMITIES: ICD-10-CM

## 2024-01-09 DIAGNOSIS — M25.511 CHRONIC RIGHT SHOULDER PAIN: ICD-10-CM

## 2024-01-09 DIAGNOSIS — S62.101D CLOSED FRACTURE OF RIGHT WRIST WITH ROUTINE HEALING, SUBSEQUENT ENCOUNTER: Primary | ICD-10-CM

## 2024-01-09 DIAGNOSIS — G89.29 CHRONIC RIGHT SHOULDER PAIN: ICD-10-CM

## 2024-01-09 NOTE — PROGRESS NOTES
Physical Therapy Daily Progress Note    Subjective   Jackie Cordova reports: she gets the most sore in her right thumb. Other than that, she is doing well.       Objective   See Exercise, Manual, and Modality Logs for complete treatment.       Assessment/Plan  Pt tolerated treatment well. Initiated sit to stands without use of hands. She required cues to shift her trunk forward to help her stand. Pt would benefit from continued skilled PT.     Progress per Plan of Care           Manual Therapy:         mins  18925;  Therapeutic Exercise:    24     mins  57199;     Neuromuscular Dima:    15    mins  62116;    Therapeutic Activity:     15     mins  81695;     Gait Training:           mins  09235;     Ultrasound:          mins  15924;    Electrical Stimulation:        mins  62217 ( );  E-Stim Attended:         mins  64634  Iontophoresis          mins 75304   Traction          mins  49530  Fluidotherapy          mins  20122  Dry Needling         mins self-pay - No Charge  Paraffin          mins  04384    Timed Treatment:   54   mins   Total Treatment:     54   mins    Mare Arceo, PT, DPT  Physical Therapist

## 2024-01-11 ENCOUNTER — TREATMENT (OUTPATIENT)
Dept: PHYSICAL THERAPY | Facility: CLINIC | Age: 79
End: 2024-01-11
Payer: MEDICARE

## 2024-01-11 DIAGNOSIS — S62.101D CLOSED FRACTURE OF RIGHT WRIST WITH ROUTINE HEALING, SUBSEQUENT ENCOUNTER: Primary | ICD-10-CM

## 2024-01-11 DIAGNOSIS — G89.29 CHRONIC RIGHT SHOULDER PAIN: ICD-10-CM

## 2024-01-11 DIAGNOSIS — M25.511 CHRONIC RIGHT SHOULDER PAIN: ICD-10-CM

## 2024-01-11 DIAGNOSIS — R29.898 WEAKNESS OF BOTH LOWER EXTREMITIES: ICD-10-CM

## 2024-01-11 NOTE — PROGRESS NOTES
Physical Therapy Daily Progress Note    Subjective   Jackie Cordova reports: she is stiff today. She was sore after her last visit.      Objective   See Exercise, Manual, and Modality Logs for complete treatment.       Assessment/Plan  Pt tolerated treatment well. She required cues for foot clearance on the left today. She is progressing well and would benefit from continued skilled PT.    Progress per Plan of Care           Manual Therapy:         mins  42326;  Therapeutic Exercise:    13     mins  86940;     Neuromuscular Dima:    10    mins  84541;    Therapeutic Activity:          mins  40407;     Gait Training:           mins  58329;     Ultrasound:          mins  32849;    Electrical Stimulation:         mins  10371 ( );  E-Stim Attended:         mins  64550  Iontophoresis          mins 42472   Traction          mins  14034  Fluidotherapy          mins  22935  Dry Needling          mins self-pay - No Charge  Paraffin          mins  51666    Timed Treatment:   23   mins   Total Treatment:     56   mins    Mare Arceo, PT, DPT  Physical Therapist

## 2024-01-23 ENCOUNTER — TREATMENT (OUTPATIENT)
Dept: PHYSICAL THERAPY | Facility: CLINIC | Age: 79
End: 2024-01-23
Payer: MEDICARE

## 2024-01-23 DIAGNOSIS — S62.101D CLOSED FRACTURE OF RIGHT WRIST WITH ROUTINE HEALING, SUBSEQUENT ENCOUNTER: Primary | ICD-10-CM

## 2024-01-23 DIAGNOSIS — M25.511 CHRONIC RIGHT SHOULDER PAIN: ICD-10-CM

## 2024-01-23 DIAGNOSIS — R29.898 WEAKNESS OF BOTH LOWER EXTREMITIES: ICD-10-CM

## 2024-01-23 DIAGNOSIS — G89.29 CHRONIC RIGHT SHOULDER PAIN: ICD-10-CM

## 2024-01-23 NOTE — PROGRESS NOTES
Physical Therapy Daily Progress Note    Subjective   Jackie Cordova reports: she feels about 70% better since starting PT. She has not had any falls. She still avoids heavy housework and walking for long periods.     Today's Pain ratin/10  Worst: 7/10    Objective          Active Range of Motion     Right Wrist   Wrist flexion: 48 degrees   Wrist extension: 51 degrees     Strength/Myotome Testing     Left Shoulder     Planes of Motion   Flexion: 4   Abduction: 4   External rotation at 0°: 4+   Internal rotation at 0°: 4+     Right Shoulder     Planes of Motion   Flexion: 3+   Abduction: 3+   External rotation at 0°: 3+   Internal rotation at 0°: 4     Left Elbow   Flexion: 4+  Extension: 4+    Right Elbow   Flexion: 4  Extension: 4    Left Wrist/Hand      (2nd hand position)   Left  strength (2nd hand position) 15 lbs    Right Wrist/Hand   Wrist extension: 4+  Wrist flexion: 4+     (2nd hand position)   Right  strength (2nd hand position) 5 lbs    Left Hip   Planes of Motion   Flexion: 4+    Right Hip   Planes of Motion   Flexion: 4    Left Knee   Flexion: 4+  Extension: 4+    Right Knee   Flexion: 4+  Extension: 4+    Left Ankle/Foot   Dorsiflexion: 4+    Right Ankle/Foot   Dorsiflexion: 4+    Functional Assessment     Comments  TU sec with RW      See Exercise, Manual, and Modality Logs for complete treatment.       Assessment/Plan  Pt is a 78 year old female who has been attending PT after a previous right femur fracture and a separate previous right wrist fracture. Her wrist AROM and LE strength have improved since starting PT. Her TUG score did not improve. She has met STG 1 and 2 and LTG 1. She is making steady progress towards her remaining goals. She continues to require constant cues for right foot clearance while ambulating and turning. Pt would benefit from continued skilled PT.    SHORT TERM GOALS:     2 weeks  1. Pt I w/ HEP (GOAL MET)  2. Pt to demonstrate PROM of the right  wrist to WFL to improve ability to perform ADL's (GOAL MET)  3. Pt to demonstrate TUG in 30 sec or less to show increased functional mobility (GOAL NOT MET)     LONG TERM GOALS:   6 weeks  1. Pt to demonstrate 4/5 bilateral UE and LE strength to improve stability with functional activities (GOAL MET)  2. Pt to perform the TUG in 15 sec or less to show increased functional mobility (GOAL NOT MET)  3. Pt to tolerate 60 minutes continuous activity in the clinic without increase in pain  (GOAL NOT MET)    Progress per Plan of Care  2x per week for 8 weeks         Manual Therapy:         mins  42544;  Therapeutic Exercise:    13     mins  96504;     Neuromuscular Dima:        mins  66929;    Therapeutic Activity:     10     mins  49304;     Gait Training:           mins  68951;     Ultrasound:          mins  89452;    Electrical Stimulation:         mins  29019 ( );  E-Stim Attended:         mins  95397  Iontophoresis          mins 93821   Traction          mins  83437  Fluidotherapy          mins  01168  Dry Needling          mins self-pay - No Charge  Paraffin          mins  72412    Timed Treatment:   23   mins   Total Treatment:     54   mins    Mare Arceo, PT, DPT  Physical Therapist

## 2024-01-25 ENCOUNTER — TREATMENT (OUTPATIENT)
Dept: PHYSICAL THERAPY | Facility: CLINIC | Age: 79
End: 2024-01-25
Payer: MEDICARE

## 2024-01-25 DIAGNOSIS — G89.29 CHRONIC RIGHT SHOULDER PAIN: ICD-10-CM

## 2024-01-25 DIAGNOSIS — M25.511 CHRONIC RIGHT SHOULDER PAIN: ICD-10-CM

## 2024-01-25 DIAGNOSIS — S62.101D CLOSED FRACTURE OF RIGHT WRIST WITH ROUTINE HEALING, SUBSEQUENT ENCOUNTER: Primary | ICD-10-CM

## 2024-01-25 DIAGNOSIS — R29.898 WEAKNESS OF BOTH LOWER EXTREMITIES: ICD-10-CM

## 2024-01-25 NOTE — PROGRESS NOTES
Physical Therapy Daily Progress Note    Subjective   Jackie Cordova reports: she feels stiff all the time. Some days she is better about doing her HEP than others.      Objective   See Exercise, Manual, and Modality Logs for complete treatment.       Assessment/Plan  Pt tolerated treatment well. She requires cues to avoid trunk leaning when performing lower extremity movements. Pt would benefit from continued skilled PT.     Progress per Plan of Care           Manual Therapy:         mins  33431;  Therapeutic Exercise:    15     mins  14626;     Neuromuscular Dima:        mins  13441;    Therapeutic Activity:     10     mins  17456;     Gait Training:           mins  91377;     Ultrasound:          mins  90679;    Electrical Stimulation:         mins  83189 ( );  E-Stim Attended:         mins  38491  Iontophoresis          mins 38570   Traction         mins  04056  Fluidotherapy          mins  25356  Dry Needling          mins self-pay - No Charge  Paraffin          mins  27320    Timed Treatment:   25   mins   Total Treatment:     56   mins    Mare Arceo, PT, DPT  Physical Therapist

## 2024-01-30 ENCOUNTER — TREATMENT (OUTPATIENT)
Dept: PHYSICAL THERAPY | Facility: CLINIC | Age: 79
End: 2024-01-30
Payer: MEDICARE

## 2024-01-30 DIAGNOSIS — S62.101D CLOSED FRACTURE OF RIGHT WRIST WITH ROUTINE HEALING, SUBSEQUENT ENCOUNTER: Primary | ICD-10-CM

## 2024-01-30 DIAGNOSIS — R29.898 WEAKNESS OF BOTH LOWER EXTREMITIES: ICD-10-CM

## 2024-01-30 DIAGNOSIS — M25.511 CHRONIC RIGHT SHOULDER PAIN: ICD-10-CM

## 2024-01-30 DIAGNOSIS — G89.29 CHRONIC RIGHT SHOULDER PAIN: ICD-10-CM

## 2024-01-30 NOTE — PROGRESS NOTES
Physical Therapy Daily Progress Note    Subjective   Jackie Cordova reports: she is doing okay today and has no new complaints.      Objective   See Exercise, Manual, and Modality Logs for complete treatment.       Assessment/Plan  Pt tolerated treatment with mod complaints of pain behind the left knee. The pain limited her from marching her left LE up with cone walking and cone tapping. She was instructed to use ice at home as needed for pain. Pt would benefit from continued skilled PT.     Progress per Plan of Care           Manual Therapy:         mins  98350;  Therapeutic Exercise:    15     mins  66644;     Neuromuscular Dima:    10    mins  31801;    Therapeutic Activity:          mins  99271;     Gait Training:           mins  10386;     Ultrasound:          mins  56389;    Electrical Stimulation:         mins  71524 ( );  E-Stim Attended:         mins  68600  Iontophoresis          mins 64726   Traction          mins  38610  Fluidotherapy          mins  50488  Dry Needling          mins self-pay - No Charge  Paraffin          mins  40276    Timed Treatment:   25   mins   Total Treatment:     53   mins    Mare Arceo, PT, DPT  Physical Therapist

## 2024-02-01 ENCOUNTER — TREATMENT (OUTPATIENT)
Dept: PHYSICAL THERAPY | Facility: CLINIC | Age: 79
End: 2024-02-01
Payer: MEDICARE

## 2024-02-01 DIAGNOSIS — S62.101D CLOSED FRACTURE OF RIGHT WRIST WITH ROUTINE HEALING, SUBSEQUENT ENCOUNTER: Primary | ICD-10-CM

## 2024-02-01 DIAGNOSIS — R29.898 WEAKNESS OF BOTH LOWER EXTREMITIES: ICD-10-CM

## 2024-02-01 DIAGNOSIS — G89.29 CHRONIC RIGHT SHOULDER PAIN: ICD-10-CM

## 2024-02-01 DIAGNOSIS — M25.511 CHRONIC RIGHT SHOULDER PAIN: ICD-10-CM

## 2024-02-01 NOTE — PROGRESS NOTES
Physical Therapy Daily Progress Note    Subjective   Jackie Cordova reports: her knee was not sore after her last visit. It continues to feel stiff, but ice seems to help.      Objective   See Exercise, Manual, and Modality Logs for complete treatment.       Assessment/Plan  Pt tolerated treatment well. She required cues to not allow trunk lean with standing hip abduction. She required mod/max A for sit to stands without hands. Pt would benefit from continued skilled PT.     Progress per Plan of Care           Manual Therapy:         mins  58986;  Therapeutic Exercise:    24     mins  79144;     Neuromuscular Dima:    15    mins  33326;    Therapeutic Activity:     15     mins  62123;     Gait Training:           mins  65093;     Ultrasound:          mins  54516;    Electrical Stimulation:         mins  48213 ( );  E-Stim Attended:         mins  49760  Iontophoresis          mins 30195   Traction          mins  23124  Fluidotherapy          mins  70030  Dry Needling          mins self-pay - No Charge  Paraffin          mins  69786    Timed Treatment:   54   mins   Total Treatment:     54   mins    Mare Arceo, PT, DPT  Physical Therapist

## 2024-02-06 ENCOUNTER — TREATMENT (OUTPATIENT)
Dept: PHYSICAL THERAPY | Facility: CLINIC | Age: 79
End: 2024-02-06
Payer: MEDICARE

## 2024-02-06 DIAGNOSIS — G89.29 CHRONIC RIGHT SHOULDER PAIN: ICD-10-CM

## 2024-02-06 DIAGNOSIS — R29.898 WEAKNESS OF BOTH LOWER EXTREMITIES: ICD-10-CM

## 2024-02-06 DIAGNOSIS — S62.101D CLOSED FRACTURE OF RIGHT WRIST WITH ROUTINE HEALING, SUBSEQUENT ENCOUNTER: Primary | ICD-10-CM

## 2024-02-06 DIAGNOSIS — M25.511 CHRONIC RIGHT SHOULDER PAIN: ICD-10-CM

## 2024-02-06 NOTE — PROGRESS NOTES
Physical Therapy Daily Progress Note    Subjective   Jackie Cordova reports: no new complaints.      Objective   See Exercise, Manual, and Modality Logs for complete treatment.       Assessment/Plan  Pt tolerated treatment well. She requires cues to avoid dragging her feet while turning with her walker. She continues to have difficulty with foot clearance during side foam step overs. Pt would benefit from continued skilled PT.     Progress per Plan of Care           Manual Therapy:         mins  82174;  Therapeutic Exercise:    24     mins  95368;     Neuromuscular Dima:    15    mins  96031;    Therapeutic Activity:     15     mins  95477;     Gait Training:           mins  47080;     Ultrasound:          mins  97723;    Electrical Stimulation:         mins  81778 ( );  E-Stim Attended:         mins  49928  Iontophoresis          mins 85613   Traction          mins  36918  Fluidotherapy          mins  08360  Dry Needling          mins self-pay - No Charge  Paraffin          mins  46932    Timed Treatment:   54   mins   Total Treatment:     54   mins    Mare Arceo, PT, DPT  Physical Therapist

## 2024-02-08 ENCOUNTER — TREATMENT (OUTPATIENT)
Dept: PHYSICAL THERAPY | Facility: CLINIC | Age: 79
End: 2024-02-08
Payer: MEDICARE

## 2024-02-08 DIAGNOSIS — G89.29 CHRONIC RIGHT SHOULDER PAIN: ICD-10-CM

## 2024-02-08 DIAGNOSIS — S62.101D CLOSED FRACTURE OF RIGHT WRIST WITH ROUTINE HEALING, SUBSEQUENT ENCOUNTER: Primary | ICD-10-CM

## 2024-02-08 DIAGNOSIS — R29.898 WEAKNESS OF BOTH LOWER EXTREMITIES: ICD-10-CM

## 2024-02-08 DIAGNOSIS — M25.511 CHRONIC RIGHT SHOULDER PAIN: ICD-10-CM

## 2024-02-08 NOTE — PROGRESS NOTES
Physical Therapy Daily Progress Note    Subjective   Jackie Cordova reports: she feels like she is improving. She notices at home she has the most difficulty with bending over to feed her cat.      Objective   See Exercise, Manual, and Modality Logs for complete treatment.       Assessment/Plan  Pt tolerated treatment well. She had increased right groin pain today and was unable to perform cone taps. Initiated cone pick ups to work on bending over and squatting. Pt would benefit from continued skilled PT.     Progress per Plan of Care           Manual Therapy:         mins  48243;  Therapeutic Exercise:    12     mins  00206;     Neuromuscular Dima:    15    mins  36864;    Therapeutic Activity:          mins  39568;     Gait Training:           mins  25006;     Ultrasound:          mins  42597;    Electrical Stimulation:        mins  89673 ( );  E-Stim Attended:         mins  66079  Iontophoresis          mins 47846   Traction          mins  50993  Fluidotherapy         mins  63076  Dry Needling          mins self-pay - No Charge  Paraffin          mins  70467    Timed Treatment:   27   mins   Total Treatment:     54   mins    Mare Arceo, PT, DPT  Physical Therapist

## 2024-02-15 ENCOUNTER — TREATMENT (OUTPATIENT)
Dept: PHYSICAL THERAPY | Facility: CLINIC | Age: 79
End: 2024-02-15
Payer: MEDICARE

## 2024-02-15 DIAGNOSIS — S62.101D CLOSED FRACTURE OF RIGHT WRIST WITH ROUTINE HEALING, SUBSEQUENT ENCOUNTER: Primary | ICD-10-CM

## 2024-02-15 DIAGNOSIS — M25.511 CHRONIC RIGHT SHOULDER PAIN: ICD-10-CM

## 2024-02-15 DIAGNOSIS — G89.29 CHRONIC RIGHT SHOULDER PAIN: ICD-10-CM

## 2024-02-15 DIAGNOSIS — R29.898 WEAKNESS OF BOTH LOWER EXTREMITIES: ICD-10-CM

## 2024-02-20 ENCOUNTER — TREATMENT (OUTPATIENT)
Dept: PHYSICAL THERAPY | Facility: CLINIC | Age: 79
End: 2024-02-20
Payer: MEDICARE

## 2024-02-20 DIAGNOSIS — S62.101D CLOSED FRACTURE OF RIGHT WRIST WITH ROUTINE HEALING, SUBSEQUENT ENCOUNTER: Primary | ICD-10-CM

## 2024-02-20 DIAGNOSIS — G89.29 CHRONIC RIGHT SHOULDER PAIN: ICD-10-CM

## 2024-02-20 DIAGNOSIS — R29.898 WEAKNESS OF BOTH LOWER EXTREMITIES: ICD-10-CM

## 2024-02-20 DIAGNOSIS — M25.511 CHRONIC RIGHT SHOULDER PAIN: ICD-10-CM

## 2024-02-20 NOTE — PROGRESS NOTES
Physical Therapy Daily Progress Note    Subjective   Jackie Cordova reports: she is doing fine today. She feels stiff but not bad.      Objective   See Exercise, Manual, and Modality Logs for complete treatment.       Assessment/Plan  Pt tolerated treatment well. She had mild pain in the left groin while performing cone walking today. She requires cues to shift her hips back with mini squats. She will be reassessed next visit.     Progress per Plan of Care           Manual Therapy:         mins  09990;  Therapeutic Exercise:    14     mins  23015;     Neuromuscular Dima:    10    mins  24835;    Therapeutic Activity:          mins  34988;     Gait Training:           mins  36250;     Ultrasound:          mins  34876;    Electrical Stimulation:         mins  24527 ( );  E-Stim Attended:         mins  03883  Iontophoresis          mins 54438   Traction          mins  98039  Fluidotherapy          mins  99811  Dry Needling          mins self-pay - No Charge  Paraffin          mins  66400    Timed Treatment:   24   mins   Total Treatment:     49   mins    Mare Arceo, PT, DPT  Physical Therapist

## 2024-03-06 ENCOUNTER — TREATMENT (OUTPATIENT)
Dept: PHYSICAL THERAPY | Facility: CLINIC | Age: 79
End: 2024-03-06
Payer: MEDICARE

## 2024-03-06 DIAGNOSIS — S62.101D CLOSED FRACTURE OF RIGHT WRIST WITH ROUTINE HEALING, SUBSEQUENT ENCOUNTER: Primary | ICD-10-CM

## 2024-03-06 DIAGNOSIS — G89.29 CHRONIC RIGHT SHOULDER PAIN: ICD-10-CM

## 2024-03-06 DIAGNOSIS — M25.511 CHRONIC RIGHT SHOULDER PAIN: ICD-10-CM

## 2024-03-06 DIAGNOSIS — R29.898 WEAKNESS OF BOTH LOWER EXTREMITIES: ICD-10-CM

## 2024-03-06 NOTE — PROGRESS NOTES
Physical Therapy Daily Progress Note    Subjective   Jackie Cordova reports: she feels about 50% improved since starting PT. She would like to be more independent with shopping, driving, and getting her own groceries.     Today's Pain rating: 3/10    QuickDASH: 45/45  LEFS: 21    Objective          Active Range of Motion     Right Wrist   Wrist flexion: 52 degrees   Wrist extension: 58 degrees     Strength/Myotome Testing     Left Shoulder     Planes of Motion   Flexion: 3+   Abduction: 3+   External rotation at 0°: 4+   Internal rotation at 0°: 4+     Right Shoulder     Planes of Motion   Flexion: 3+   Abduction: 3+   External rotation at 0°: 4   Internal rotation at 0°: 4     Left Elbow   Flexion: 5  Extension: 5    Right Elbow   Flexion: 4  Extension: 4    Left Wrist/Hand      (2nd hand position)   Left  strength (2nd hand position) 16 lbs    Right Wrist/Hand      (2nd hand position)   Right  strength (2nd hand position) 7 lbs    Left Hip   Planes of Motion   Flexion: 4+    Right Hip   Planes of Motion   Flexion: 4+    Left Knee   Flexion: 5  Extension: 4+    Right Knee   Flexion: 5  Extension: 4+    Left Ankle/Foot   Dorsiflexion: 4    Right Ankle/Foot   Dorsiflexion: 4+    Functional Assessment     Comments  TU sec with RW      See Exercise, Manual, and Modality Logs for complete treatment.       Assessment/Plan  Pt is a 78 year old female who has been attending PT after a previous right femur fracture and a separate previous right wrist fracture. Her wrist AROM and  strength have improved. Her UE strength is still poor. Her LE strength is improving. Discussed decreasing frequency to 1x per week. She was given an updated HEP and instructed on the importance of doing more at home.     Progress per Plan of Care  1x per week for 8 weeks         Manual Therapy:         mins  84290;  Therapeutic Exercise:    24     mins  18598;     Neuromuscular Dima:    15    mins  01884;    Therapeutic  Activity:     15     mins  99920;     Gait Training:           mins  40830;     Ultrasound:          mins  51399;    Electrical Stimulation:         mins  29474 ( );  E-Stim Attended:         mins  94592  Iontophoresis          mins 41045   Traction          mins  68279  Fluidotherapy         mins  84761  Dry Needling          mins self-pay - No Charge  Paraffin          mins  06160    Timed Treatment:   54   mins   Total Treatment:     54   mins    Mare Arceo, PT, DPT  Physical Therapist

## 2024-03-15 ENCOUNTER — OFFICE VISIT (OUTPATIENT)
Dept: ENDOCRINOLOGY | Facility: CLINIC | Age: 79
End: 2024-03-15
Payer: MEDICARE

## 2024-03-15 VITALS
BODY MASS INDEX: 30.18 KG/M2 | OXYGEN SATURATION: 98 % | HEART RATE: 72 BPM | HEIGHT: 62 IN | DIASTOLIC BLOOD PRESSURE: 66 MMHG | WEIGHT: 164 LBS | SYSTOLIC BLOOD PRESSURE: 124 MMHG

## 2024-03-15 DIAGNOSIS — E55.9 VITAMIN D DEFICIENCY: ICD-10-CM

## 2024-03-15 DIAGNOSIS — R79.89 INCREASED PTH LEVEL: ICD-10-CM

## 2024-03-15 DIAGNOSIS — M81.0 OSTEOPOROSIS WITHOUT CURRENT PATHOLOGICAL FRACTURE, UNSPECIFIED OSTEOPOROSIS TYPE: Primary | ICD-10-CM

## 2024-03-15 LAB
25(OH)D3 SERPL-MCNC: 19 NG/ML (ref 30–100)
ALBUMIN SERPL-MCNC: 4.4 G/DL (ref 3.5–5.2)
ANION GAP SERPL CALCULATED.3IONS-SCNC: 9.1 MMOL/L (ref 5–15)
BUN SERPL-MCNC: 27 MG/DL (ref 8–23)
BUN/CREAT SERPL: 29.3 (ref 7–25)
CA-I BLD-MCNC: 5.8 MG/DL (ref 4.6–5.4)
CA-I SERPL ISE-MCNC: 1.46 MMOL/L (ref 1.15–1.35)
CALCIUM SPEC-SCNC: 10.7 MG/DL (ref 8.6–10.5)
CHLORIDE SERPL-SCNC: 104 MMOL/L (ref 98–107)
CO2 SERPL-SCNC: 25.9 MMOL/L (ref 22–29)
CREAT SERPL-MCNC: 0.92 MG/DL (ref 0.57–1)
EGFRCR SERPLBLD CKD-EPI 2021: 64.3 ML/MIN/1.73
GLUCOSE SERPL-MCNC: 78 MG/DL (ref 65–99)
PHOSPHATE SERPL-MCNC: 3.7 MG/DL (ref 2.5–4.5)
POTASSIUM SERPL-SCNC: 5.7 MMOL/L (ref 3.5–5.2)
PTH-INTACT SERPL-MCNC: 56.7 PG/ML (ref 15–65)
SODIUM SERPL-SCNC: 139 MMOL/L (ref 136–145)

## 2024-03-15 PROCEDURE — 82306 VITAMIN D 25 HYDROXY: CPT | Performed by: INTERNAL MEDICINE

## 2024-03-15 PROCEDURE — 82330 ASSAY OF CALCIUM: CPT | Performed by: INTERNAL MEDICINE

## 2024-03-15 PROCEDURE — 83970 ASSAY OF PARATHORMONE: CPT | Performed by: INTERNAL MEDICINE

## 2024-03-15 PROCEDURE — 80069 RENAL FUNCTION PANEL: CPT | Performed by: INTERNAL MEDICINE

## 2024-03-15 RX ORDER — ERGOCALCIFEROL 1.25 MG/1
50000 CAPSULE ORAL WEEKLY
Qty: 8 CAPSULE | Refills: 0 | Status: SHIPPED | OUTPATIENT
Start: 2024-03-15

## 2024-03-15 NOTE — PROGRESS NOTES
Chief Complaint   Patient presents with   • Osteoporosis        New patient who is being seen in consultation regarding osteoporosis, hyperparathyroidism at the request of Whitley Treadwell PA-C HPI   Jackie Cordova is a 78 y.o. female who presents for evaluation of osteoporosis, hyperparathyroidism.    Patient presents with her sister who aids in providing some history.  Patient was initially prescribed Fosamax by provider in [place].  She previously took this regularly but had a femur fracture approximately 1 year ago after falling in the shower and has not taken medication regularly since the fall.  She also reports she is not routinely taking calcium.  She is intermittently taking vitamin D.  She does have ongoing issues with balance and remains in physical therapy.  She ambulates with a walker.  Despite this, she does report occasional falls.  Patient denies any history of long-term steroid exposure.  She estimates she went through menopause in her early 50s.  She denies any prior history of nephrolithiasis.      Was noted to have elevation of PTH on labs with PCP in October 2023.  Vitamin D was also slightly low at that time.  Concurrent calcium was normal though patient does have a history of intermittent hypercalcemia.    Past Medical History:   Diagnosis Date   • Arthritis    • COPD (chronic obstructive pulmonary disease)    • Hyperlipidemia    • Osteoporosis    • Wrist fracture      Past Surgical History:   Procedure Laterality Date   • COLONOSCOPY     • ENDOSCOPY     • HIP SURGERY     • LEG SURGERY Right    • ORIF HIP FRACTURE Right    • REPLACEMENT TOTAL KNEE        Family History   Problem Relation Age of Onset   • Arthritis Mother    • Cancer Father    • Arthritis Sister    • Arthritis Brother    • Arthritis Brother    • Diabetes Brother    • Arthritis Brother    • Arthritis Brother    • Breast cancer Neg Hx       Social History     Socioeconomic History   • Marital status:    Tobacco Use    • Smoking status: Former     Current packs/day: 0.00     Average packs/day: 0.5 packs/day for 40.0 years (20.0 ttl pk-yrs)     Types: Cigarettes     Start date: 1981     Quit date: 2021     Years since quitting: 3.2   • Smokeless tobacco: Never   Vaping Use   • Vaping status: Never Used   Substance and Sexual Activity   • Alcohol use: Not Currently     Comment: occasionally a glass of wine   • Drug use: Never   • Sexual activity: Defer      No Known Allergies   Current Outpatient Medications on File Prior to Visit   Medication Sig Dispense Refill   • acetaminophen (TYLENOL) 325 MG tablet Take 2 tablets by mouth Every 4 (Four) Hours As Needed for Mild Pain. (Patient taking differently: Take 2 tablets by mouth Every 6 (Six) Hours As Needed for Mild Pain.)     • albuterol sulfate  (90 Base) MCG/ACT inhaler Inhale 2 puffs Every 4 (Four) Hours As Needed for Wheezing. 18 g 0   • Breo Ellipta 100-25 MCG/ACT aerosol powder  Inhale 1 puff Daily. 60 each 5   • celecoxib (CeleBREX) 200 MG capsule Take 1 capsule by mouth Daily. 90 capsule 2   • nystatin (MYCOSTATIN) 168432 UNIT/GM cream      • pantoprazole (PROTONIX) 40 MG EC tablet Take 1 tablet by mouth Daily. 90 tablet 2   • simvastatin (ZOCOR) 40 MG tablet Take 1 tablet by mouth Every Night. 90 tablet 3   • alendronate (Fosamax) 70 MG tablet Take 1 tablet by mouth Every 7 (Seven) Days.       No current facility-administered medications on file prior to visit.        Review of Systems   Constitutional:  Positive for chills, diaphoresis and fatigue.   HENT:  Positive for dental problem, drooling, hearing loss, rhinorrhea and sneezing.    Respiratory:  Positive for wheezing.    Gastrointestinal:  Positive for constipation and GERD.   Genitourinary:  Positive for urgency and urinary incontinence.   Musculoskeletal:  Positive for arthralgias, gait problem and joint swelling.        Vitals:    03/15/24 0949   BP: 124/66   BP Location: Left arm   Patient Position: Sitting  "  Cuff Size: Adult   Pulse: 72   SpO2: 98%   Weight: 74.4 kg (164 lb)   Height: 157.5 cm (62\")   Body mass index is 30 kg/m².     Physical Exam  Vitals reviewed.   Constitutional:       General: She is not in acute distress.  HENT:      Head: Normocephalic and atraumatic.   Neck:      Thyroid: No thyromegaly.   Cardiovascular:      Rate and Rhythm: Normal rate and regular rhythm.   Pulmonary:      Effort: Pulmonary effort is normal.      Breath sounds: Normal breath sounds.   Musculoskeletal:      Comments: Ambulates with walker, has difficulty rising from chair   Neurological:      Mental Status: She is alert.      Gait: Gait abnormal.   Psychiatric:         Mood and Affect: Mood and affect normal.         Behavior: Behavior is cooperative.          Labs/Imaging  Narrative & Impression   DUAL-ENERGY X-RAY ABSORPTIOMETRY (DXA)     INDICATION: Postmenopausal, screening for osteoporosis, history of glucocorticoids, rheumatoid arthritis     COMPARISON: There are no equivalent studies available for comparison     PROCEDURE: A DXA scan was performed using a Hologic densitometer.     The lumbar spine L1-L4 was evaluated.     The T-score compares the patient's bone mineral density with the peak bone mass of young normal patients.  According to criteria established by the World Health Organization, patients with T-scores  between 1.0 and 2.5 standard deviations BELOW the mean   are osteopenic (low bone mass).  Patients with T-scores EQUAL TO OR GREATER than 2.5 standard deviations below the mean are osteoporotic.     The Z-score compares the patient bone mineral density with age and sex matched peers.  According to the International Society for Clinical Densitometry's 2007 consensus conference:  In women prior to menopause and men less than age 50, Z-scores, not   T-scores are preferred.  A Z-score of -2.0 or lower is defined as \"below the expected range for age\" and a Z-score above -2.0 is \"within the expected range for " "age.\"  The WHO diagnostic criteria may be applied in women in the menopausal transition.    Osteoporosis cannot be diagnosed in men under age 50 on the basis of BMD alone.     TECHNICAL QUALITY:  The study is of good technical quality.     RESULTS:     Lumbar Spine:  The BMD measured in the L1-L4 region is 0.877 g/cm2.  The average T-score is -1.5.  The Z-score is 1.0.     IMPRESSION:  Osteopenia of the 1 through L4 vertebrae. Please note this bone mineral density scan is limited due to the inability to scan the right or left hip, or right or left forearm.     The ten year fracture risk assessment was not calculated because the hips were not evaluated during this exam.     All the treatment decisions require clinical judgment and consideration of individual patient factors, including patient preferences, co-morbidities, previous drug use, risk factors not captured in the FRAX model (frailty, falls, vitamin D deficiency,   increased bone turnover, interval significant decline in bone density) and possible under or over estimation of fracture risk by FRAX. Approaches to reduce osteoporosis related fracture risk include optimizing calcium and vitamin D status, appropriate   weight bearing exercises and fall-prevention measurements.  The National Osteoporosis Foundation recommends (http://www.nof.org/hcp/practice/practice-and-clinical-guidelines/clinicians-guide) that FDA-approved medical therapies be considered in   postmenopausal women and men aged equal or greater than 50 years with :  a) hip or vertebral (clinical or morphometric) fracture; b) T-score of -2.5 or less at the spine or hip; c) Ten-year fracture probability by FRAX of greater than 3% for hip fracture   of greater than 20% for major osteoporotic fracture.       Secondary causes of bone loss should be evaluated if clinically indicated since the etiology of low BMD cannot be determined by BMD measurement alone.     FOLLOWUP: Consider repeating the study in " 2-3 years to reassess the patient's status or sooner if there is some new clinical indication.     INTERVAL CHANGE:   There were no equivalent studies available for comparison.        At this facility, the least significant change in the BMD at the left hip with 95% confidence is 0.058131 gm/cm2 at the hip and 0.406522 g/cm2 at the lumbar spine.     Electronically Signed: Sg Hudson    8/9/2023 5:14 PM EDT    Workstation ID: [id number]      Latest Reference Range & Units 10/30/23 09:19   Sodium 136 - 145 mmol/L 138   Potassium 3.5 - 5.2 mmol/L 4.7   Chloride 98 - 107 mmol/L 105   CO2 22.0 - 29.0 mmol/L 22.5   Anion Gap 5.0 - 15.0 mmol/L 10.5   BUN 8 - 23 mg/dL 24 (H)   Creatinine 0.57 - 1.00 mg/dL 0.83   BUN/Creatinine Ratio 7.0 - 25.0  28.9 (H)   eGFR >60.0 mL/min/1.73 72.7   Glucose 65 - 99 mg/dL 77   Calcium 8.6 - 10.5 mg/dL 9.9   Alkaline Phosphatase 39 - 117 U/L 111   Total Protein 6.0 - 8.5 g/dL 7.0   Albumin 3.5 - 5.2 g/dL 4.5   Globulin gm/dL 2.5   A/G Ratio g/dL 1.8   AST (SGOT) 1 - 32 U/L 21   ALT (SGPT) 1 - 33 U/L 15   Total Bilirubin 0.0 - 1.2 mg/dL 0.4   PTH Intact (Serial Monitor) 15.0 - 65.0 pg/mL 67.5 (H)   (H): Data is abnormally high     Latest Reference Range & Units 10/30/23 09:19   25 Hydroxy, Vitamin D 30.0 - 100.0 ng/ml 26.2 (L)   (L): Data is abnormally low     Latest Reference Range & Units 03/28/23 14:18 05/02/23 12:01 06/29/23 11:07 07/31/23 11:45 10/30/23 09:19   Calcium 8.6 - 10.5 mg/dL  11.1 (H) 10.4 10.6 (H) 9.9   Ionized Calcium 1.15 - 1.35 mmol/L  4.6 - 5.4 mg/dL 4.7 (E)  1.30  5.2     (H): Data is abnormally high  (E): External lab result    Assessment and Plan    Diagnoses and all orders for this visit:    1. Osteoporosis without current pathological fracture, unspecified osteoporosis type (Primary)  -     PTH, Intact; Future  -     Renal Function Panel; Future  -     Vitamin D,25-Hydroxy; Future  -     Calcium, Ionized; Future    2. Increased PTH level  -     PTH, Intact;  Future  -     Renal Function Panel; Future  -     Vitamin D,25-Hydroxy; Future  -     Calcium, Ionized; Future    3. Vitamin D deficiency  -     PTH, Intact; Future  -     Renal Function Panel; Future  -     Vitamin D,25-Hydroxy; Future  -     Calcium, Ionized; Future    Results of DEXA scan from 2023 reviewed.  Patient with osteopenia of the lumbar spine.  BMD not measured at hip due to femur fracture. Patient will update the clinic with the name of her prior PCP in [place] and then we will attempt to obtain historical DEXA data for comparison.  Patient is currently prescribed Fosamax but sister reports she has not taken this medication regularly for at least the past year.  She denies any adverse effects.  Patient states she simply got out of the habit of taking this medication.  She is also not routinely taking calcium and vitamin D. We discussed importance of compliance with medical therapy.  Patient recently noted to have elevated PTH, discussed that this could be primary versus secondary from vitamin D deficiency.  Will plan to repeat labs today. Discussed need to correct vitamin D deficiency as first step in evaluation. We did review the potential impact of primary hyperparathyroidism on bone and that osteoporosis would be an indication for surgery. Determine next steps after review of labs.       Return in about 4 months (around 7/15/2024) for Next scheduled follow up. The patient was instructed to contact the clinic with any interval questions or concerns.    Electronically signed by: Ivana Banerjee MD     Dictated Utilizing Dragon Dictation

## 2024-03-28 ENCOUNTER — TREATMENT (OUTPATIENT)
Dept: PHYSICAL THERAPY | Facility: CLINIC | Age: 79
End: 2024-03-28
Payer: MEDICARE

## 2024-03-28 DIAGNOSIS — M25.511 CHRONIC RIGHT SHOULDER PAIN: ICD-10-CM

## 2024-03-28 DIAGNOSIS — S62.101D CLOSED FRACTURE OF RIGHT WRIST WITH ROUTINE HEALING, SUBSEQUENT ENCOUNTER: Primary | ICD-10-CM

## 2024-03-28 DIAGNOSIS — G89.29 CHRONIC RIGHT SHOULDER PAIN: ICD-10-CM

## 2024-03-28 DIAGNOSIS — R29.898 WEAKNESS OF BOTH LOWER EXTREMITIES: ICD-10-CM

## 2024-03-28 NOTE — PROGRESS NOTES
Physical Therapy Daily Progress Note    Subjective   Jackie Cordova reports: she has been trying to do more of her exercises at home.      Objective   See Exercise, Manual, and Modality Logs for complete treatment.       Assessment/Plan  Pt tolerated treatment well. She was unable to perform cone walking using the left leg leading due to increased pain in her groin. All other exercises went well and did not irritate her pain. Pt would benefit from continued skilled PT.     Progress per Plan of Care           Manual Therapy:         mins  17273;  Therapeutic Exercise:    15     mins  29187;     Neuromuscular Dima:    10    mins  52523;    Therapeutic Activity:          mins  05925;     Gait Training:           mins  65668;     Ultrasound:          mins  04184;    Electrical Stimulation:         mins  08421 ( );  E-Stim Attended:         mins  05625  Iontophoresis          mins 64160   Traction         mins  66901  Fluidotherapy          mins  08428  Dry Needling          mins self-pay - No Charge  Paraffin          mins  20569    Timed Treatment:    25  mins   Total Treatment:     49   mins    Mare Arceo, PT, DPT  Physical Therapist

## 2024-04-04 ENCOUNTER — TREATMENT (OUTPATIENT)
Dept: PHYSICAL THERAPY | Facility: CLINIC | Age: 79
End: 2024-04-04
Payer: MEDICARE

## 2024-04-04 DIAGNOSIS — G89.29 CHRONIC RIGHT SHOULDER PAIN: ICD-10-CM

## 2024-04-04 DIAGNOSIS — M25.511 CHRONIC RIGHT SHOULDER PAIN: ICD-10-CM

## 2024-04-04 DIAGNOSIS — S62.101D CLOSED FRACTURE OF RIGHT WRIST WITH ROUTINE HEALING, SUBSEQUENT ENCOUNTER: Primary | ICD-10-CM

## 2024-04-04 DIAGNOSIS — R29.898 WEAKNESS OF BOTH LOWER EXTREMITIES: ICD-10-CM

## 2024-04-18 ENCOUNTER — TREATMENT (OUTPATIENT)
Dept: PHYSICAL THERAPY | Facility: CLINIC | Age: 79
End: 2024-04-18
Payer: MEDICARE

## 2024-04-18 DIAGNOSIS — M25.511 CHRONIC RIGHT SHOULDER PAIN: ICD-10-CM

## 2024-04-18 DIAGNOSIS — R29.898 WEAKNESS OF BOTH LOWER EXTREMITIES: ICD-10-CM

## 2024-04-18 DIAGNOSIS — S62.101D CLOSED FRACTURE OF RIGHT WRIST WITH ROUTINE HEALING, SUBSEQUENT ENCOUNTER: Primary | ICD-10-CM

## 2024-04-18 DIAGNOSIS — G89.29 CHRONIC RIGHT SHOULDER PAIN: ICD-10-CM

## 2024-04-18 NOTE — PROGRESS NOTES
Physical Therapy Daily Progress Note    Subjective   Jackie Cordova reports: her knee feels about the same overall. She is noticing some itching in her right thumb.      Objective   See Exercise, Manual, and Modality Logs for complete treatment.       Assessment/Plan  Pt tolerated treatment well. She was able to perform sit to stands with min A today. She is still limited by swelling of the left knee. Pt would benefit from continued skilled PT.     Progress per Plan of Care           Manual Therapy:         mins  74090;  Therapeutic Exercise:    15     mins  47881;     Neuromuscular Dima:    12    mins  87319;    Therapeutic Activity:          mins  64694;     Gait Training:           mins  63756;     Ultrasound:          mins  37278;    Electrical Stimulation:         mins  62416 ( );  E-Stim Attended:         mins  33369  Iontophoresis         mins 05039   Traction          mins  81561  Fluidotherapy          mins  14931  Dry Needling          mins self-pay - No Charge  Paraffin          mins  80303    Timed Treatment:   27   mins   Total Treatment:     49   mins    Mare Arceo, PT, DPT  Physical Therapist

## 2024-05-09 RX ORDER — ERGOCALCIFEROL 1.25 MG/1
50000 CAPSULE ORAL WEEKLY
Qty: 8 CAPSULE | Refills: 0 | OUTPATIENT
Start: 2024-05-09

## 2024-05-13 ENCOUNTER — TREATMENT (OUTPATIENT)
Dept: PHYSICAL THERAPY | Facility: CLINIC | Age: 79
End: 2024-05-13
Payer: MEDICARE

## 2024-05-13 DIAGNOSIS — R29.898 WEAKNESS OF BOTH LOWER EXTREMITIES: ICD-10-CM

## 2024-05-13 DIAGNOSIS — G89.29 CHRONIC RIGHT SHOULDER PAIN: ICD-10-CM

## 2024-05-13 DIAGNOSIS — M25.511 CHRONIC RIGHT SHOULDER PAIN: ICD-10-CM

## 2024-05-13 DIAGNOSIS — S62.101D CLOSED FRACTURE OF RIGHT WRIST WITH ROUTINE HEALING, SUBSEQUENT ENCOUNTER: Primary | ICD-10-CM

## 2024-05-13 RX ORDER — ERGOCALCIFEROL 1.25 MG/1
50000 CAPSULE ORAL WEEKLY
Qty: 8 CAPSULE | Refills: 0 | OUTPATIENT
Start: 2024-05-13

## 2024-05-13 NOTE — TELEPHONE ENCOUNTER
[facility] IN [place] [place] [facility] FA[facility]ED REQUEST FOR VITAMIN D RX.

## 2024-05-14 ENCOUNTER — LAB (OUTPATIENT)
Dept: INTERNAL MEDICINE | Facility: CLINIC | Age: 79
End: 2024-05-14
Payer: MEDICARE

## 2024-05-14 ENCOUNTER — OFFICE VISIT (OUTPATIENT)
Dept: INTERNAL MEDICINE | Facility: CLINIC | Age: 79
End: 2024-05-14
Payer: MEDICARE

## 2024-05-14 VITALS
HEART RATE: 71 BPM | DIASTOLIC BLOOD PRESSURE: 62 MMHG | SYSTOLIC BLOOD PRESSURE: 112 MMHG | TEMPERATURE: 96.4 F | HEIGHT: 62 IN | BODY MASS INDEX: 31.54 KG/M2 | WEIGHT: 171.4 LBS | OXYGEN SATURATION: 97 %

## 2024-05-14 DIAGNOSIS — R53.1 GENERALIZED WEAKNESS: ICD-10-CM

## 2024-05-14 DIAGNOSIS — M81.0 OSTEOPOROSIS WITHOUT CURRENT PATHOLOGICAL FRACTURE, UNSPECIFIED OSTEOPOROSIS TYPE: Primary | ICD-10-CM

## 2024-05-14 DIAGNOSIS — J44.9 CHRONIC OBSTRUCTIVE PULMONARY DISEASE, UNSPECIFIED COPD TYPE: Primary | ICD-10-CM

## 2024-05-14 DIAGNOSIS — R79.89 INCREASED PTH LEVEL: ICD-10-CM

## 2024-05-14 DIAGNOSIS — E78.00 PURE HYPERCHOLESTEROLEMIA: ICD-10-CM

## 2024-05-14 DIAGNOSIS — E21.3 HYPERPARATHYROIDISM: ICD-10-CM

## 2024-05-14 DIAGNOSIS — E55.9 VITAMIN D DEFICIENCY: ICD-10-CM

## 2024-05-14 DIAGNOSIS — M81.0 OSTEOPOROSIS WITHOUT CURRENT PATHOLOGICAL FRACTURE, UNSPECIFIED OSTEOPOROSIS TYPE: ICD-10-CM

## 2024-05-14 DIAGNOSIS — K21.9 GERD WITHOUT ESOPHAGITIS: ICD-10-CM

## 2024-05-14 LAB
25(OH)D3 SERPL-MCNC: 48 NG/ML (ref 30–100)
ALBUMIN SERPL-MCNC: 4.1 G/DL (ref 3.5–5.2)
ANION GAP SERPL CALCULATED.3IONS-SCNC: 10 MMOL/L (ref 5–15)
BUN SERPL-MCNC: 28 MG/DL (ref 8–23)
BUN/CREAT SERPL: 36.8 (ref 7–25)
CALCIUM SPEC-SCNC: 10.1 MG/DL (ref 8.6–10.5)
CHLORIDE SERPL-SCNC: 102 MMOL/L (ref 98–107)
CO2 SERPL-SCNC: 25 MMOL/L (ref 22–29)
CREAT SERPL-MCNC: 0.76 MG/DL (ref 0.57–1)
EGFRCR SERPLBLD CKD-EPI 2021: 80.3 ML/MIN/1.73
GLUCOSE SERPL-MCNC: 87 MG/DL (ref 65–99)
PHOSPHATE SERPL-MCNC: 3.5 MG/DL (ref 2.5–4.5)
POTASSIUM SERPL-SCNC: 4.9 MMOL/L (ref 3.5–5.2)
PTH-INTACT SERPL-MCNC: 67 PG/ML (ref 15–65)
SODIUM SERPL-SCNC: 137 MMOL/L (ref 136–145)

## 2024-05-14 PROCEDURE — 36415 COLL VENOUS BLD VENIPUNCTURE: CPT | Performed by: INTERNAL MEDICINE

## 2024-05-14 PROCEDURE — 1160F RVW MEDS BY RX/DR IN RCRD: CPT | Performed by: PHYSICIAN ASSISTANT

## 2024-05-14 PROCEDURE — 99214 OFFICE O/P EST MOD 30 MIN: CPT | Performed by: PHYSICIAN ASSISTANT

## 2024-05-14 PROCEDURE — 1126F AMNT PAIN NOTED NONE PRSNT: CPT | Performed by: PHYSICIAN ASSISTANT

## 2024-05-14 PROCEDURE — 82652 VIT D 1 25-DIHYDROXY: CPT | Performed by: INTERNAL MEDICINE

## 2024-05-14 PROCEDURE — 82306 VITAMIN D 25 HYDROXY: CPT | Performed by: INTERNAL MEDICINE

## 2024-05-14 PROCEDURE — 1159F MED LIST DOCD IN RCRD: CPT | Performed by: PHYSICIAN ASSISTANT

## 2024-05-14 NOTE — PROGRESS NOTES
Chief Complaint  Labs Only (Endocrinology  stated needs labs ) and COPD    Subjective          History of Present Illness  Jackie Cordova presents to [facility] for   History of Present Illness  HLD:  Well controlled on Simvastatin 40mg. No AE  Lab Results       Component                Value               Date                       CHOL                     162                 07/31/2023                 TRIG                     69                  07/31/2023                 HDL                      66 (H)              07/31/2023                 LDL                      83                  07/31/2023              Osteoporosis:  Last dexa showed osteopenia of lumbar spine, BMD not measured at hip due to femur fracture.  Takes Fosamax but not every week. Is now on vitamin D daily and also the weekly high dose as rxed by meme, she has repeat labs today to see if cont to have vit D def. Concern for hyperparathyroidism.     COPD:  On Breo daily but does not use it every day. Does not have wheeze or SOA very often, just when she gets sick with bronchitis or if she goes on a long walk. Uses alb prn a few times a month at most.     Arthritis:  She will have joint pain with rainy weather. She has pain in elbows, knees, shoulders, occ hand pain but not too bad, sometimes has knuckle swelling. Occ rt knee swelling, had sx on this knee previously. Takes tylenol pm and celebrex 200 for pain.  She is getting physical therapy to help with overall strength and mobility.     Low vit D:  Taking weekly vitamin D, is avoiding calcium supplement as her calcium had been elevated. She also takes a daily vitamin D supplement.       The following portions of the patient's history were reviewed and updated as appropriate: allergies, current medications, past family history, past medical history, past social history, past surgical history and problem list.  No Known Allergies    Current Outpatient Medications:      "acetaminophen (TYLENOL) 325 MG tablet, Take 2 tablets by mouth Every 4 (Four) Hours As Needed for Mild Pain. (Patient taking differently: Take 2 tablets by mouth Every 6 (Six) Hours As Needed for Mild Pain.), Disp: , Rfl:     albuterol sulfate  (90 Base) MCG/ACT inhaler, Inhale 2 puffs Every 4 (Four) Hours As Needed for Wheezing., Disp: 18 g, Rfl: 0    Breo Ellipta 100-25 MCG/ACT aerosol powder , Inhale 1 puff Daily., Disp: 60 each, Rfl: 5    celecoxib (CeleBREX) 200 MG capsule, Take 1 capsule by mouth Daily., Disp: 90 capsule, Rfl: 2    nystatin (MYCOSTATIN) 614572 UNIT/GM cream, , Disp: , Rfl:     simvastatin (ZOCOR) 40 MG tablet, Take 1 tablet by mouth Every Night., Disp: 90 tablet, Rfl: 3    vitamin D (ERGOCALCIFEROL) 1.25 MG (10182 UT) capsule capsule, Take 1 capsule by mouth 1 (One) Time Per Week., Disp: 8 capsule, Rfl: 0    alendronate (Fosamax) 70 MG tablet, Take 1 tablet by mouth Every 7 (Seven) Days. (Patient not taking: Reported on 3/15/2024), Disp: , Rfl:   No orders of the defined types were placed in this encounter.    Social History     Tobacco Use   Smoking Status Former    Current packs/day: 0.00    Average packs/day: 0.5 packs/day for 40.0 years (20.0 ttl pk-yrs)    Types: Cigarettes    Start date: 1981    Quit date: 2021    Years since quitting: 3.3   Smokeless Tobacco Never        Objective   Vital Signs:   Vitals:    05/14/24 0930   BP: 112/62   Pulse: 71   Temp: 96.4 °F (35.8 °C)   TempSrc: Temporal   SpO2: 97%   Weight: 77.7 kg (171 lb 6.4 oz)   Height: 157.5 cm (62.01\")      Body mass index is 31.34 kg/m².  Physical Exam  Vitals reviewed.   Constitutional:       General: She is not in acute distress.     Appearance: Normal appearance.   HENT:      Head: Normocephalic and atraumatic.   Eyes:      General: No scleral icterus.     Extraocular Movements: Extraocular movements intact.      Conjunctiva/sclera: Conjunctivae normal.   Cardiovascular:      Rate and Rhythm: Normal rate and " regular rhythm.      Heart sounds: Normal heart sounds. No murmur heard.  Pulmonary:      Effort: Pulmonary effort is normal. No respiratory distress.      Breath sounds: Normal breath sounds. No stridor. No wheezing or rhonchi.   Musculoskeletal:      Cervical back: Normal range of motion and neck supple.      Comments: Walking with walker, difficulty getting up from chair   Skin:     General: Skin is warm and dry.      Coloration: Skin is not jaundiced.   Neurological:      General: No focal deficit present.      Mental Status: She is alert and oriented to person, place, and time.      Gait: Gait normal.   Psychiatric:         Mood and Affect: Mood normal.         Behavior: Behavior normal.        No LMP recorded. Patient is postmenopausal.         Result Review :                   Assessment and Plan    Diagnoses and all orders for this visit:    1. Chronic obstructive pulmonary disease, unspecified COPD type (Primary)  Assessment & Plan:  COPD is improving with treatment.  Continue current medications.  Reviewed to use Breo daily and albuterol only prn if needed.       2. Osteoporosis without current pathological fracture, unspecified osteoporosis type  Assessment & Plan:  Cont vit D, continue Fosamax, cont to f/u with Endo as dir    Orders:  -     PTH, Intact  -     Renal Function Panel  -     Vitamin D,25-Hydroxy  -     Calcitriol (1,25 di-OH Vitamin D)    3. Increased PTH level  -     PTH, Intact  -     Renal Function Panel  -     Vitamin D,25-Hydroxy  -     Calcitriol (1,25 di-OH Vitamin D)    4. Vitamin D deficiency  Assessment & Plan:  Cont vitamin D as dir by endo    Orders:  -     PTH, Intact  -     Renal Function Panel  -     Vitamin D,25-Hydroxy  -     Calcitriol (1,25 di-OH Vitamin D)    5. Pure hypercholesterolemia  Assessment & Plan:  Lipid abnormalities are improving with treatment.  Nutritional counseling was provided. and Pharmacotherapy as ordered.  Lipids will be reassessed in 6 months. Cont  Zocor      6. GERD without esophagitis  Assessment & Plan:  Stable w/o protonix at this time, monitor      7. Hyperparathyroidism  Assessment & Plan:  Get repeat labs today      8. Generalized weakness  Assessment & Plan:  Stable, cont PT            Follow Up   Return in about 6 months (around 11/14/2024) for Yearly Physical.    Follow up if symptoms worsen or persist or has new or concerning symptoms, go to ER for severe symptoms.   Reviewed common medication effects and side effects and advised to report side effects immediately.  Encouraged medication compliance and the importance of keeping scheduled follow up appointments with me and any other providers.  If a referral was made please contact our office if you have not heard about an appointment in the next 2 weeks.   If labs or images are ordered we will contact you with the results within the next week.  If you have not heard from us after a week please call our office to inquire about the results.   Patient was given instructions and counseling regarding her condition or for health maintenance advice. Please see specific information pulled into the AVS if appropriate.     Whitley Treadwell PA-C    * Please note that portions of this note were completed with a voice recognition program.

## 2024-05-15 DIAGNOSIS — R79.89 INCREASED PTH LEVEL: Primary | ICD-10-CM

## 2024-05-16 ENCOUNTER — LAB (OUTPATIENT)
Dept: ENDOCRINOLOGY | Facility: CLINIC | Age: 79
End: 2024-05-16
Payer: MEDICARE

## 2024-05-16 NOTE — PROGRESS NOTES
Physical Therapy Re Certification Of Plan of Care    [facility]   [address] [facility], [address] [place] [address]    Patient: Jackie Cordova   : 1945  Diagnosis/ICD-10 Code:  Closed fracture of right wrist with routine healing, subsequent encounter [S62.101D]  Referring practitioner: Whitley Treadwell PA-C  Date of Initial Visit: Type: THERAPY  Noted: 2023  Today's Date: 2024  Patient seen for 28 sessions         Visit Diagnoses:    ICD-10-CM ICD-9-CM   1. Closed fracture of right wrist with routine healing, subsequent encounter  S62.101D V54.19   2. Weakness of both lower extremities  R29.898 729.89   3. Chronic right shoulder pain  M25.511 719.41    G89.29 338.29         Jackie Cordova reports that she continues to feel some instability when walking and fatigue when up and walking for longer periods of time, but she feels more safe when using the walker. Carlos has been having some increased soreness in the right shoulder recently as well, but she denies any increase in pain or exacerbation of symptoms with therapy treatments. She does reports feeling a little more than 50% improvement since beginning therapy.     Clinical Progress: improved  Home Program Compliance: Yes  Treatment has included: therapeutic exercise, neuromuscular re-education, manual therapy, and therapeutic activity      Subjective       Objective     Objective          Active Range of Motion     Right Wrist   Wrist flexion: 54 degrees   Wrist extension: 51 degrees     Strength/Myotome Testing     Left Shoulder     Planes of Motion   Flexion: 4-   Abduction: 3+   External rotation at 0°: 4+   Internal rotation at 0°: 4+     Right Shoulder     Planes of Motion   Flexion: 4-   Abduction: 3+   External rotation at 0°: 4   Internal rotation at 0°: 4+     Left Elbow   Flexion: 5  Extension: 5    Right Elbow   Flexion: 4  Extension: 4      Left Hip   Planes of Motion   Flexion: 4+    Right Hip   Planes of Motion   Flexion: 4+    Left  Knee   Flexion: 5  Extension: 4+    Right Knee   Flexion: 5  Extension: 4+    Left Ankle/Foot   Dorsiflexion: 4    Right Ankle/Foot   Dorsiflexion: 4+    Assessment/Plan    Pt has responded well to therapy and she demonstrates an improvement in her stability and safety with ambulation in the clinic using a rollator walker. She tolerates prolonged exercise in the clinic as well and demonstrates an improvement in her overall endurance and strength. She will cont to benefit from skilled PT services to address remaining strength limitations and improve safety when performing functional tasks.      Recommendations: Continue as planned  Timeframe: 6 weeks  Prognosis to achieve goals: good      Timed:         Manual Therapy:         mins  91846;     Therapeutic Exercise:    30     mins  95184;     Neuromuscular Dima:    14    mins  57054;    Therapeutic Activity:     10     mins  97466;     Gait Training:           mins  32966;     Ultrasound:          mins  50248;    Ionto                                   mins   42669  Self Care                            mins   47976  Canalith Repos         mins 10354      Un-Timed:  Electrical Stimulation:         mins  28124 (MC );  Dry Needling          mins self-pay  Traction          mins 54973  Re-Eval                               mins  84060      Timed Treatment:   54   mins   Total Treatment:     54   mins          PT: Francois Fernandez PT, DPT, OCS, Cert. DN   [place] License:  [id number]    Electronically signed by Francois Fernandez PT, 05/16/24, 4:41 PM EDT    Certification Period: 5/16/2024 thru 8/13/2024  I certify that the therapy services are furnished while this patient is under my care.  The services outlined above are required by this patient, and will be reviewed every 90 days.         Physician Signature:__________________________________________________    PHYSICIAN: Whitley Treadwell PA-C  NPI: [id number]                                      DATE:  :     Please sign and  return via fax to [email or web address] . Thank you, [facility]

## 2024-05-17 LAB — 1,25(OH)2D SERPL-MCNC: 75.7 PG/ML (ref 24.8–81.5)

## 2024-05-20 ENCOUNTER — TELEPHONE (OUTPATIENT)
Dept: PHYSICAL THERAPY | Facility: CLINIC | Age: 79
End: 2024-05-20

## 2024-05-29 ENCOUNTER — TELEPHONE (OUTPATIENT)
Dept: INTERNAL MEDICINE | Facility: CLINIC | Age: 79
End: 2024-05-29
Payer: MEDICARE

## 2024-05-29 DIAGNOSIS — S62.101A CLOSED FRACTURE OF RIGHT WRIST, INITIAL ENCOUNTER: Primary | ICD-10-CM

## 2024-05-29 DIAGNOSIS — M25.511 CHRONIC RIGHT SHOULDER PAIN: ICD-10-CM

## 2024-05-29 DIAGNOSIS — G89.29 CHRONIC RIGHT SHOULDER PAIN: ICD-10-CM

## 2024-05-29 NOTE — TELEPHONE ENCOUNTER
[facility] informed patient that she will need a new referral for PT for Closed fracture of right wrist, initial encounter  Chronic right shoulder pain

## 2024-06-03 ENCOUNTER — TREATMENT (OUTPATIENT)
Dept: PHYSICAL THERAPY | Facility: CLINIC | Age: 79
End: 2024-06-03
Payer: MEDICARE

## 2024-06-03 DIAGNOSIS — S62.101D CLOSED FRACTURE OF RIGHT WRIST WITH ROUTINE HEALING, SUBSEQUENT ENCOUNTER: Primary | ICD-10-CM

## 2024-06-03 DIAGNOSIS — G89.29 CHRONIC RIGHT SHOULDER PAIN: ICD-10-CM

## 2024-06-03 DIAGNOSIS — M25.511 CHRONIC RIGHT SHOULDER PAIN: ICD-10-CM

## 2024-06-03 DIAGNOSIS — R29.898 WEAKNESS OF BOTH LOWER EXTREMITIES: ICD-10-CM

## 2024-06-03 NOTE — PROGRESS NOTES
Physical Therapy Daily Treatment Note                [address] [place] [address]       Patient: Jackie Cordova   : 1945  Diagnosis/ICD-10 Code:  Closed fracture of right wrist with routine healing, subsequent encounter [S62.101D]  Referring practitioner: Whitley Treadwell PA-C  Date of Initial Visit: Type: THERAPY  Noted: 2023  Today's Date: 6/3/2024  Patient seen for 29 sessions             Subjective   Jackie Cordova reports: doing okay with no new complaints. Still dealing with difficulty getting up out of chairs and walking.  strength is getting better with right hand.     Objective   See Exercise, Manual, and Modality Logs for complete treatment.       Assessment/Plan  Pt continues to need cues to pick her feet up to decrease shuffling. She was able to complete mini squats and sit to stand with a decrease in legs pain when activating gluteus baljinder more. Gave band with handles so she can complete scapular retraction with resistance for progression.   Other awaiting authorization to continue           Timed:  Manual Therapy:         mins  32851;  Therapeutic Exercise:         mins  41249;     Neuromuscular Dima:        mins  79317;    Therapeutic Activity:          mins  74513;     Gait Training:           mins  44772;     Ultrasound:          mins  60526;    Electrical Stimulation:         mins  84501;  Iontophoresis          mins  71624    Untimed:  Electrical Stimulation:         mins  70494 ( );  Mechanical Traction:         mins  17856;   Fluidotherapy          mins  65238    Timed Treatment:   0   mins   Total Treatment:     0   mins        Juanita Osborne PTA  Physical Therapist Assistant

## 2024-06-17 ENCOUNTER — LAB (OUTPATIENT)
Dept: ENDOCRINOLOGY | Facility: CLINIC | Age: 79
End: 2024-06-17
Payer: MEDICARE

## 2024-06-17 DIAGNOSIS — R79.89 INCREASED PTH LEVEL: ICD-10-CM

## 2024-06-17 LAB
CALCIUM 24H UR-MCNC: 7.6 MG/DL
CALCIUM 24H UR-MRATE: 60.8 MG/24 HR (ref 100–300)
COLLECT DURATION TIME UR: 24 HRS
COLLECT DURATION TIME UR: 24 HRS
CREAT UR-MCNC: 56.7 MG/DL
CREATINE 24H UR-MRATE: 0.45 G/24 HR (ref 0.7–1.6)
SPECIMEN VOL 24H UR: 800 ML
SPECIMEN VOL 24H UR: 800 ML

## 2024-07-03 ENCOUNTER — TREATMENT (OUTPATIENT)
Dept: PHYSICAL THERAPY | Facility: CLINIC | Age: 79
End: 2024-07-03
Payer: MEDICARE

## 2024-07-03 DIAGNOSIS — R29.898 WEAKNESS OF BOTH LOWER EXTREMITIES: ICD-10-CM

## 2024-07-03 DIAGNOSIS — S62.101D CLOSED FRACTURE OF RIGHT WRIST WITH ROUTINE HEALING, SUBSEQUENT ENCOUNTER: Primary | ICD-10-CM

## 2024-07-03 DIAGNOSIS — M25.511 CHRONIC RIGHT SHOULDER PAIN: ICD-10-CM

## 2024-07-03 DIAGNOSIS — G89.29 CHRONIC RIGHT SHOULDER PAIN: ICD-10-CM

## 2024-07-03 NOTE — PROGRESS NOTES
Physical Therapy Daily Treatment Note    [facility]   [address] [facility], [address] [place] [address]      Patient: Jackie Cordova   : 1945  Referring practitioner: Whitley Treadwell PA-C  Date of Initial Visit: Type: THERAPY  Noted: 2023  Today's Date: 2024  Patient seen for 30 sessions    Certification Period 2024 thru 10/2/2024       Visit Diagnoses:    ICD-10-CM ICD-9-CM   1. Closed fracture of right wrist with routine healing, subsequent encounter  S62.101D V54.19   2. Weakness of both lower extremities  R29.898 729.89   3. Chronic right shoulder pain  M25.511 719.41    G89.29 338.29       Subjective     Jackie Cordova presents to the physical therapy clinic today reporting stable symptom pattern following their previous therapy visit. Noting most significant pain in left knee; posteriorly.    Objective          Functional Assessment     Comments  5xSTS    Trial 1 - 60s  Trial 2 - 42s  Trial 3 - unable            See Exercise, Manual, and Modality Logs for complete treatment.       Assessment/Plan     During physical therapy session, patient demonstrates fair response to treatment, progress with functional activity, pain levels and progress towards therapy goals from previous visits. Progress specifically noted during today's session includes improvement in STS noted with verbal cues for forward trunk and eye gaze as well as manual cueing for TKE on left; improved to regular seat height within session. Remaining deficits still noted during session today are limited knee ROM and difficulty with functional activities/performance. Jackie Cordova will continue to benefit from skilled physical therapy services to address deficits and continue to work towards reaching functional goals. No changes to current PT POC, patient will continue AT.       Next Visit:  Continue to work on left knee tightness  STS practice      Timed:         Manual Therapy:    10     mins  06674;     Therapeutic  Exercise:         mins  20021;     Neuromuscular Dima:        mins  12731;    Therapeutic Activity:     15     mins  53418;     Gait Training:           mins  48956;     Ultrasound:          mins  17311;    Ionto                                   mins   00194  Self Care                            mins   24415  Canalith Repos         mins 82814  Electrical Stimulation:         mins  27378    Un-Timed:  Electrical Stimulation:         mins  19341 ( );  Dry Needling          mins self-pay  Traction          mins 78043      Timed Treatment:   25   mins   Total Treatment:     25   mins    Visit and Documentation completed by Azael Collier PT,  DINA   [place] License Number: [id number]    Francois Fernandez PT, DINA, OCS, Cert. DN  [place] License: [id number]

## 2024-07-08 ENCOUNTER — TREATMENT (OUTPATIENT)
Dept: PHYSICAL THERAPY | Facility: CLINIC | Age: 79
End: 2024-07-08
Payer: MEDICARE

## 2024-07-08 DIAGNOSIS — S62.101D CLOSED FRACTURE OF RIGHT WRIST WITH ROUTINE HEALING, SUBSEQUENT ENCOUNTER: Primary | ICD-10-CM

## 2024-07-08 DIAGNOSIS — R29.898 WEAKNESS OF BOTH LOWER EXTREMITIES: ICD-10-CM

## 2024-07-08 DIAGNOSIS — G89.29 CHRONIC RIGHT SHOULDER PAIN: ICD-10-CM

## 2024-07-08 DIAGNOSIS — M25.511 CHRONIC RIGHT SHOULDER PAIN: ICD-10-CM

## 2024-07-08 NOTE — PROGRESS NOTES
Physical Therapy Daily Treatment Note    [facility]   [address] [facility] [address], [address] [place] [address]      Patient: Jackie Cordova   : 1945  Referring practitioner: Whitley Treadwell PA-C  Date of Initial Visit: Type: THERAPY  Noted: 2023  Today's Date: 2024  Patient seen for 31 sessions    Certification Period 2024 thru 10/5/2024       Visit Diagnoses:    ICD-10-CM ICD-9-CM   1. Closed fracture of right wrist with routine healing, subsequent encounter  S62.101D V54.19   2. Weakness of both lower extremities  R29.898 729.89   3. Chronic right shoulder pain  M25.511 719.41    G89.29 338.29       Subjective     Jackie Cordova presents to the physical therapy clinic today reporting stable symptom pattern following their previous therapy visit. They state good compliance to home interventions; completed them over the weekend. Other reports from patient during visit today include: knee continues to feel stiff bilaterally.     Objective   See Exercise, Manual, and Modality Logs for complete treatment.       Assessment/Plan     During physical therapy session, patient demonstrates fair response to treatment, progress with functional activity, pain levels and progress towards therapy goals from previous visits. Progress specifically noted during today's session includes able to perform recumbent bridge and fair performance of quadriceps activation. Remaining deficits still noted during session today are limitedc functional performance of supine<>sit. Jackie Cordova will continue to benefit from skilled physical therapy services to address deficits and continue to work towards reaching functional goals. No changes to current PT POC, patient will continue AT.       Next Visit:  Continue with passive stretching  Transfer practice  Shoulder AROM      Timed:         Manual Therapy:    15     mins  32886;     Therapeutic Exercise:         mins  20302;     Neuromuscular Dima:        mins  71497;     Therapeutic Activity:     25     mins  84692;     Gait Training:           mins  28534;     Ultrasound:          mins  87324;    Ionto                                   mins   55348  Self Care                            mins   46203  Canalith Repos         mins 04577  Electrical Stimulation:         mins  56395    Un-Timed:  Electrical Stimulation:         mins  26651 ( );  Dry Needling          mins self-pay  Traction          mins 37490      Timed Treatment:   40   mins   Total Treatment:     40   mins    Visit and Documentation completed by Azael Collier PT,  DINA   [place] License Number: [id number]    Francois Fernandez PT, DINA, OCS, Cert. DN  [place] License: [id number]

## 2024-07-15 ENCOUNTER — TREATMENT (OUTPATIENT)
Dept: PHYSICAL THERAPY | Facility: CLINIC | Age: 79
End: 2024-07-15
Payer: MEDICARE

## 2024-07-15 DIAGNOSIS — S62.101D CLOSED FRACTURE OF RIGHT WRIST WITH ROUTINE HEALING, SUBSEQUENT ENCOUNTER: Primary | ICD-10-CM

## 2024-07-15 DIAGNOSIS — G89.29 CHRONIC RIGHT SHOULDER PAIN: ICD-10-CM

## 2024-07-15 DIAGNOSIS — R29.898 WEAKNESS OF BOTH LOWER EXTREMITIES: ICD-10-CM

## 2024-07-15 DIAGNOSIS — M25.511 CHRONIC RIGHT SHOULDER PAIN: ICD-10-CM

## 2024-07-15 NOTE — PROGRESS NOTES
Physical Therapy Daily Treatment Note    [facility]   [address] [facility] [address], [address] [place] [address]      Patient: Jackie Cordova   : 1945  Referring practitioner: Whitley Treadwell PA-C  Date of Initial Visit: Type: THERAPY  Noted: 2023  Today's Date: 7/15/2024  Patient seen for 32 sessions    Certification Period 7/15/2024 thru 10/12/2024       Visit Diagnoses:    ICD-10-CM ICD-9-CM   1. Closed fracture of right wrist with routine healing, subsequent encounter  S62.101D V54.19   2. Weakness of both lower extremities  R29.898 729.89   3. Chronic right shoulder pain  M25.511 719.41    G89.29 338.29       Subjective     Jackie Cordova presents to the physical therapy clinic today reporting stable symptom pattern following their previous therapy visit. They state poor compliance to home interventions. Other reports from patient during visit today include: pain mostly limited by knees to date. Although feels confident about mobility in home and basic community navigation.    Objective   See Exercise, Manual, and Modality Logs for complete treatment.       Assessment/Plan     During physical therapy session, patient demonstrates fair response to treatment, progress with functional activity, pain levels and progress towards therapy goals from previous visits. Progress specifically noted during today's session includes improvement in performance of STS within session for verbal cues and external cueing to shift weight forward for standing from a low surface. Remaining deficits still noted during session today are limited knee ROM and generalized weakness. Jackie Cordova will continue to benefit from skilled physical therapy services to address deficits and continue to work towards reaching functional goals. Changes made today were updated HEP for continued care and IND at home.      Next Visit:  D/c      Timed:         Manual Therapy:         mins  46920;     Therapeutic Exercise:    15     mins  11217;      Neuromuscular Dima:        mins  65542;    Therapeutic Activity:     15     mins  54334;     Gait Training:           mins  45232;     Ultrasound:          mins  62644;    Ionto                                   mins   10331  Self Care                            mins   69962  Canalith Repos         mins 72395  Electrical Stimulation:         mins  79366    Un-Timed:  Electrical Stimulation:         mins  50539 ( );  Dry Needling          mins self-pay  Traction          mins 27391      Timed Treatment:   30   mins   Total Treatment:     30   mins    Visit and Documentation completed by Azael Collier PT,  DINA   [place] License Number: [id number]    rFancois Fernandez PT, DINA, OCS, Cert. DN  [place] License: [id number]

## 2024-07-22 ENCOUNTER — TELEPHONE (OUTPATIENT)
Dept: PHYSICAL THERAPY | Facility: OTHER | Age: 79
End: 2024-07-22
Payer: MEDICARE

## 2024-07-22 NOTE — TELEPHONE ENCOUNTER
Caller: Jackie Cordova    Relationship: Self    What was the call regarding: PATIENT CANCELLED APPT TODAY BECAUSE THEY CANT MAKE IT

## 2024-07-30 ENCOUNTER — TREATMENT (OUTPATIENT)
Dept: PHYSICAL THERAPY | Facility: CLINIC | Age: 79
End: 2024-07-30
Payer: MEDICARE

## 2024-07-30 DIAGNOSIS — R29.898 WEAKNESS OF BOTH LOWER EXTREMITIES: ICD-10-CM

## 2024-07-30 DIAGNOSIS — M25.511 CHRONIC RIGHT SHOULDER PAIN: ICD-10-CM

## 2024-07-30 DIAGNOSIS — S62.101D CLOSED FRACTURE OF RIGHT WRIST WITH ROUTINE HEALING, SUBSEQUENT ENCOUNTER: Primary | ICD-10-CM

## 2024-07-30 DIAGNOSIS — G89.29 CHRONIC RIGHT SHOULDER PAIN: ICD-10-CM

## 2024-07-30 NOTE — PROGRESS NOTES
Physical Therapy Daily Treatment Note    [facility]   [address] [facility], [address] [place] [address]      Patient: Jackie Cordova   : 1945  Referring practitioner: Whitley Treadwell PA-C  Date of Initial Visit: Type: THERAPY  Noted: 2023  Today's Date: 2024  Patient seen for 33 sessions    Certification Period 2024 thru 10/29/2024       Visit Diagnoses:    ICD-10-CM ICD-9-CM   1. Closed fracture of right wrist with routine healing, subsequent encounter  S62.101D V54.19   2. Weakness of both lower extremities  R29.898 729.89   3. Chronic right shoulder pain  M25.511 719.41    G89.29 338.29       Subjective     Jackie Cordova presents to the physical therapy clinic today reporting stable symptom pattern following their previous therapy visit. They state good compliance to home interventions; able to complete most functional mobility without limitations, only stiffness in LE still noted as limiting.   Objective   See Exercise, Manual, and Modality Logs for complete treatment.       Assessment/Plan     During physical therapy session, patient demonstrates good response to treatment, progress with functional activity, pain levels and progress towards therapy goals from previous visits. Progress specifically noted during today's session includes excellent performance of STS today without UE assst and IND within session. Remaining deficits still noted during session today are limited LE mobility however patient with satisfactory meeting of functional goals and ability to perform IND within session for safe mobility. Jackie Cordova verbalizes satisfaction with therapy course of care and will continued with interventions at home as planned. Patient educated on continued compliance to interventions at home for preventative care and to contact office with any further questions or concerns. She will be discharged from further therapy services. It was a pleasure to work with her.    Timed:         Manual  Therapy:    8     mins  24254;     Therapeutic Exercise:         mins  00380;     Neuromuscular Dima:        mins  09791;    Therapeutic Activity:     20     mins  55212;     Gait Training:           mins  30945;     Ultrasound:          mins  74273;    Ionto                                   mins   00802  Self Care                            mins   31128  Canalith Repos        mins 24661  Electrical Stimulation:         mins  27188    Un-Timed:  Electrical Stimulation:         mins  76062 ( );  Dry Needling          mins self-pay  Traction          mins 38072      Timed Treatment:   28   mins   Total Treatment:     28   mins    Visit and Documentation completed by Azael Collier PT,  DINA   [place] License Number: [id number]    Francois Fernandez PT, DINA, OCS, Cert. DN  [place] License: [id number]

## 2024-08-29 ENCOUNTER — OFFICE VISIT (OUTPATIENT)
Dept: ENDOCRINOLOGY | Facility: CLINIC | Age: 79
End: 2024-08-29
Payer: MEDICARE

## 2024-08-29 VITALS
BODY MASS INDEX: 31.2 KG/M2 | DIASTOLIC BLOOD PRESSURE: 82 MMHG | HEART RATE: 74 BPM | WEIGHT: 170.6 LBS | SYSTOLIC BLOOD PRESSURE: 130 MMHG | OXYGEN SATURATION: 98 %

## 2024-08-29 DIAGNOSIS — R79.89 HIGH SERUM PARATHYROID HORMONE (PTH): ICD-10-CM

## 2024-08-29 DIAGNOSIS — E55.9 VITAMIN D DEFICIENCY: ICD-10-CM

## 2024-08-29 DIAGNOSIS — M81.0 OSTEOPOROSIS WITHOUT CURRENT PATHOLOGICAL FRACTURE, UNSPECIFIED OSTEOPOROSIS TYPE: Primary | ICD-10-CM

## 2024-08-29 PROCEDURE — 99214 OFFICE O/P EST MOD 30 MIN: CPT | Performed by: INTERNAL MEDICINE

## 2024-08-29 RX ORDER — ALENDRONATE SODIUM 70 MG/1
70 TABLET ORAL
Qty: 12 TABLET | Refills: 1 | Status: SHIPPED | OUTPATIENT
Start: 2024-08-29

## 2024-08-29 NOTE — PROGRESS NOTES
Chief Complaint   Patient presents with    Osteoporosis        HPI   Jackie Cordova is a 79 y.o. female had concerns including Osteoporosis.      Patient did have a single fall in the interim since last visit but did not sustain injury.  She reports that she was reaching for something while in the shower and slipped.  She is not currently taking medical therapy for osteoporosis.  She is not taking calcium or vitamin D.    The following portions of the patient's history were reviewed and updated as appropriate: allergies, current medications, and past social history.    Review of Systems   Musculoskeletal:  Positive for gait problem.      /82   Pulse 74   Wt 77.4 kg (170 lb 9.6 oz)   SpO2 98%   BMI 31.20 kg/m²      Physical Exam      Constitutional:  well developed; well nourished  no acute distress   ENT/Thyroid: not examined   Eyes: Conjunctiva: clear   Respiratory:  breathing is unlabored  clear to auscultation bilaterally   Cardiovascular:  regular rate and rhythm   Chest:  Not performed.   Abdomen: Not performed.   : Not performed.   Musculoskeletal: Not performed   Skin: not performed.  dry and warm   Neuro: mental status, speech normal   Psych: mood and affect are within normal limits       Labs/Imaging   Latest Reference Range & Units 03/15/24 10:27 05/14/24 10:03 06/17/24 10:52   Sodium 136 - 145 mmol/L 139 137    Potassium 3.5 - 5.2 mmol/L 5.7 (H) 4.9    Chloride 98 - 107 mmol/L 104 102    CO2 22.0 - 29.0 mmol/L 25.9 25.0    Anion Gap 5.0 - 15.0 mmol/L 9.1 10.0    BUN 8 - 23 mg/dL 27 (H) 28 (H)    Creatinine 0.57 - 1.00 mg/dL 0.92 0.76    BUN/Creatinine Ratio 7.0 - 25.0  29.3 (H) 36.8 (H)    eGFR >60.0 mL/min/1.73 64.3 80.3    Glucose 65 - 99 mg/dL 78 87    Calcium 8.6 - 10.5 mg/dL 10.7 (H) 10.1    Ionized Calcium 1.15 - 1.35 mmol/L  4.6 - 5.4 mg/dL 1.46 (H)  5.8 (H)     Phosphorus 2.5 - 4.5 mg/dL 3.7 3.5    Albumin 3.5 - 5.2 g/dL 4.4 4.1    PTH Intact (Serial Monitor) 15.0 - 65.0 pg/mL 56.7 67.0  (H)    1,25-Dihydroxy, Vitamin D 24.8 - 81.5 pg/mL  75.7    25 Hydroxy, Vitamin D 30.0 - 100.0 ng/ml 19.0 (L) 48.0    Calcium, 24H Urine 100.0 - 300.0 mg/24 hr   60.8 (L)   Creatinine, 24H  0.70 - 1.60 g/24 hr   0.45 (L)   Urine Volume mL  mL   800  800   (H): Data is abnormally high  (L): Data is abnormally low    Diagnoses and all orders for this visit:    1. Osteoporosis without current pathological fracture, unspecified osteoporosis type (Primary)  -     alendronate (Fosamax) 70 MG tablet; Take 1 tablet by mouth Every 7 (Seven) Days.  Dispense: 12 tablet; Refill: 1  Most recent DEXA scan from August 2023.  [place] were [place].  Patient with osteopenia of the lumbar spine.  She does have a history of hip fracture.  Discussed that medical therapy is recommended given clinical osteoporosis.  Patient previously tolerated Fosamax without issue, following discussion of risks and benefits, patient will resume Fosamax 70 mg weekly.  Reviewed administration instructions and when to contact the clinic should there be concern for adverse effect.  Patient voiced understanding.    2. High serum parathyroid hormone (PTH)  This did not correct following vitamin D supplementation.  Value was 67 in May 2024.  However, repeat calcium was normal.  24-hour urine collection was not completed accurately.  Patient's sister reports that this will be difficult but she does not always make it to the restroom.  Will defer repeat evaluation at this time given normal calcium.  We did review potential impact of elevated PTH and osteoporosis.    3. Vitamin D deficiency  Patient completed supplementation with 50,000 international units weekly.  Levels were normal on testing in May 2024.  Discussed need to initiate daily supplement to prevent values from again becoming low.  Patient will start 1000 international units daily       Return in about 6 months (around 2/28/2025) for Next scheduled follow up. The  patient was instructed to contact the clinic with any interval questions or concerns.    Electronically signed by: Ivana Banerjee MD   Endocrinologist    Dictated Utilizing Dragon Dictation

## 2024-09-10 DIAGNOSIS — G89.29 CHRONIC RIGHT SHOULDER PAIN: ICD-10-CM

## 2024-09-10 DIAGNOSIS — M25.561 CHRONIC PAIN OF BOTH KNEES: ICD-10-CM

## 2024-09-10 DIAGNOSIS — M25.511 CHRONIC RIGHT SHOULDER PAIN: ICD-10-CM

## 2024-09-10 DIAGNOSIS — M25.562 CHRONIC PAIN OF BOTH KNEES: ICD-10-CM

## 2024-09-10 DIAGNOSIS — G89.29 CHRONIC PAIN OF BOTH KNEES: ICD-10-CM

## 2024-09-10 DIAGNOSIS — S62.101A CLOSED FRACTURE OF RIGHT WRIST, INITIAL ENCOUNTER: ICD-10-CM

## 2024-09-10 NOTE — TELEPHONE ENCOUNTER
Rx Refill Note  Requested Prescriptions     Pending Prescriptions Disp Refills    celecoxib (CeleBREX) 200 MG capsule [Pharmacy Med Name: CELECOXIB 200 MG CAPSULE] 90 capsule 2     Sig: TAKE 1 CAPSULE BY MOUTH DAILY      Last office visit with prescribing clinician: 5/14/2024   Last telemedicine visit with prescribing clinician: Visit date not found   Next office visit with prescribing clinician: 11/15/2024       Jenny Garcia MA  09/10/24, 17:43 EDT

## 2024-09-11 RX ORDER — CELECOXIB 200 MG/1
200 CAPSULE ORAL DAILY
Qty: 90 CAPSULE | Refills: 1 | Status: SHIPPED | OUTPATIENT
Start: 2024-09-11

## 2024-10-21 DIAGNOSIS — E78.5 HYPERLIPIDEMIA, UNSPECIFIED HYPERLIPIDEMIA TYPE: ICD-10-CM

## 2024-10-21 RX ORDER — SIMVASTATIN 40 MG
40 TABLET ORAL NIGHTLY
Qty: 90 TABLET | Refills: 3 | Status: SHIPPED | OUTPATIENT
Start: 2024-10-21

## 2024-11-15 ENCOUNTER — OFFICE VISIT (OUTPATIENT)
Dept: INTERNAL MEDICINE | Facility: CLINIC | Age: 79
End: 2024-11-15
Payer: MEDICARE

## 2024-11-15 ENCOUNTER — LAB (OUTPATIENT)
Dept: INTERNAL MEDICINE | Facility: CLINIC | Age: 79
End: 2024-11-15
Payer: MEDICARE

## 2024-11-15 VITALS
OXYGEN SATURATION: 98 % | WEIGHT: 173.8 LBS | DIASTOLIC BLOOD PRESSURE: 76 MMHG | HEART RATE: 66 BPM | RESPIRATION RATE: 20 BRPM | HEIGHT: 62 IN | TEMPERATURE: 97.8 F | SYSTOLIC BLOOD PRESSURE: 110 MMHG | BODY MASS INDEX: 31.98 KG/M2

## 2024-11-15 DIAGNOSIS — E55.9 VITAMIN D DEFICIENCY: ICD-10-CM

## 2024-11-15 DIAGNOSIS — E78.00 PURE HYPERCHOLESTEROLEMIA: ICD-10-CM

## 2024-11-15 DIAGNOSIS — N30.01 ACUTE CYSTITIS WITH HEMATURIA: ICD-10-CM

## 2024-11-15 DIAGNOSIS — M81.8 OTHER OSTEOPOROSIS WITHOUT CURRENT PATHOLOGICAL FRACTURE: ICD-10-CM

## 2024-11-15 DIAGNOSIS — E21.3 HYPERPARATHYROIDISM: ICD-10-CM

## 2024-11-15 DIAGNOSIS — G89.29 CHRONIC RIGHT SHOULDER PAIN: ICD-10-CM

## 2024-11-15 DIAGNOSIS — R32 URINARY INCONTINENCE, UNSPECIFIED TYPE: ICD-10-CM

## 2024-11-15 DIAGNOSIS — R60.0 LOWER EXTREMITY EDEMA: ICD-10-CM

## 2024-11-15 DIAGNOSIS — M25.562 CHRONIC PAIN OF BOTH KNEES: ICD-10-CM

## 2024-11-15 DIAGNOSIS — M81.0 OSTEOPOROSIS WITHOUT CURRENT PATHOLOGICAL FRACTURE, UNSPECIFIED OSTEOPOROSIS TYPE: ICD-10-CM

## 2024-11-15 DIAGNOSIS — G89.29 CHRONIC PAIN OF LEFT ANKLE: ICD-10-CM

## 2024-11-15 DIAGNOSIS — M25.561 CHRONIC PAIN OF BOTH KNEES: ICD-10-CM

## 2024-11-15 DIAGNOSIS — M25.511 CHRONIC RIGHT SHOULDER PAIN: ICD-10-CM

## 2024-11-15 DIAGNOSIS — J44.9 CHRONIC OBSTRUCTIVE PULMONARY DISEASE, UNSPECIFIED COPD TYPE: ICD-10-CM

## 2024-11-15 DIAGNOSIS — Z00.00 MEDICARE ANNUAL WELLNESS VISIT, SUBSEQUENT: Primary | ICD-10-CM

## 2024-11-15 DIAGNOSIS — R31.29 MICROSCOPIC HEMATURIA: ICD-10-CM

## 2024-11-15 DIAGNOSIS — G89.29 CHRONIC PAIN OF BOTH KNEES: ICD-10-CM

## 2024-11-15 DIAGNOSIS — M25.572 CHRONIC PAIN OF LEFT ANKLE: ICD-10-CM

## 2024-11-15 LAB
25(OH)D3 SERPL-MCNC: 31.9 NG/ML (ref 30–100)
ALBUMIN SERPL-MCNC: 4.5 G/DL (ref 3.5–5.2)
ALBUMIN/GLOB SERPL: 1.6 G/DL
ALP SERPL-CCNC: 87 U/L (ref 39–117)
ALT SERPL W P-5'-P-CCNC: 23 U/L (ref 1–33)
ANION GAP SERPL CALCULATED.3IONS-SCNC: 10 MMOL/L (ref 5–15)
AST SERPL-CCNC: 27 U/L (ref 1–32)
BASOPHILS # BLD AUTO: 0.05 10*3/MM3 (ref 0–0.2)
BASOPHILS NFR BLD AUTO: 0.5 % (ref 0–1.5)
BILIRUB BLD-MCNC: NEGATIVE MG/DL
BILIRUB SERPL-MCNC: 0.4 MG/DL (ref 0–1.2)
BUN SERPL-MCNC: 29 MG/DL (ref 8–23)
BUN/CREAT SERPL: 34.1 (ref 7–25)
CALCIUM SPEC-SCNC: 11.3 MG/DL (ref 8.6–10.5)
CHLORIDE SERPL-SCNC: 103 MMOL/L (ref 98–107)
CHOLEST SERPL-MCNC: 174 MG/DL (ref 0–200)
CLARITY, POC: ABNORMAL
CO2 SERPL-SCNC: 25 MMOL/L (ref 22–29)
COLOR UR: ABNORMAL
CREAT SERPL-MCNC: 0.85 MG/DL (ref 0.57–1)
DEPRECATED RDW RBC AUTO: 44.3 FL (ref 37–54)
EGFRCR SERPLBLD CKD-EPI 2021: 70.2 ML/MIN/1.73
EOSINOPHIL # BLD AUTO: 0.46 10*3/MM3 (ref 0–0.4)
EOSINOPHIL NFR BLD AUTO: 4.7 % (ref 0.3–6.2)
ERYTHROCYTE [DISTWIDTH] IN BLOOD BY AUTOMATED COUNT: 13.3 % (ref 12.3–15.4)
EXPIRATION DATE: ABNORMAL
GLOBULIN UR ELPH-MCNC: 2.8 GM/DL
GLUCOSE SERPL-MCNC: 82 MG/DL (ref 65–99)
GLUCOSE UR STRIP-MCNC: NEGATIVE MG/DL
HCT VFR BLD AUTO: 38.3 % (ref 34–46.6)
HDLC SERPL-MCNC: 61 MG/DL (ref 40–60)
HGB BLD-MCNC: 12.6 G/DL (ref 12–15.9)
IMM GRANULOCYTES # BLD AUTO: 0.02 10*3/MM3 (ref 0–0.05)
IMM GRANULOCYTES NFR BLD AUTO: 0.2 % (ref 0–0.5)
KETONES UR QL: NEGATIVE
LDLC SERPL CALC-MCNC: 96 MG/DL (ref 0–100)
LDLC/HDLC SERPL: 1.54 {RATIO}
LEUKOCYTE EST, POC: ABNORMAL
LYMPHOCYTES # BLD AUTO: 2.86 10*3/MM3 (ref 0.7–3.1)
LYMPHOCYTES NFR BLD AUTO: 29.5 % (ref 19.6–45.3)
Lab: ABNORMAL
MCH RBC QN AUTO: 29.9 PG (ref 26.6–33)
MCHC RBC AUTO-ENTMCNC: 32.9 G/DL (ref 31.5–35.7)
MCV RBC AUTO: 90.8 FL (ref 79–97)
MONOCYTES # BLD AUTO: 0.84 10*3/MM3 (ref 0.1–0.9)
MONOCYTES NFR BLD AUTO: 8.7 % (ref 5–12)
NEUTROPHILS NFR BLD AUTO: 5.48 10*3/MM3 (ref 1.7–7)
NEUTROPHILS NFR BLD AUTO: 56.4 % (ref 42.7–76)
NITRITE UR-MCNC: POSITIVE MG/ML
NRBC BLD AUTO-RTO: 0 /100 WBC (ref 0–0.2)
PH UR: 5.5 [PH] (ref 5–8)
PLATELET # BLD AUTO: 392 10*3/MM3 (ref 140–450)
PMV BLD AUTO: 10.2 FL (ref 6–12)
POTASSIUM SERPL-SCNC: 5.4 MMOL/L (ref 3.5–5.2)
PROT SERPL-MCNC: 7.3 G/DL (ref 6–8.5)
PROT UR STRIP-MCNC: NEGATIVE MG/DL
RBC # BLD AUTO: 4.22 10*6/MM3 (ref 3.77–5.28)
RBC # UR STRIP: ABNORMAL /UL
SODIUM SERPL-SCNC: 138 MMOL/L (ref 136–145)
SP GR UR: 1.01 (ref 1–1.03)
TRIGL SERPL-MCNC: 94 MG/DL (ref 0–150)
TSH SERPL DL<=0.05 MIU/L-ACNC: 1.97 UIU/ML (ref 0.27–4.2)
UROBILINOGEN UR QL: NORMAL
VLDLC SERPL-MCNC: 17 MG/DL (ref 5–40)
WBC NRBC COR # BLD AUTO: 9.71 10*3/MM3 (ref 3.4–10.8)

## 2024-11-15 PROCEDURE — 82306 VITAMIN D 25 HYDROXY: CPT | Performed by: PHYSICIAN ASSISTANT

## 2024-11-15 PROCEDURE — 84443 ASSAY THYROID STIM HORMONE: CPT | Performed by: PHYSICIAN ASSISTANT

## 2024-11-15 PROCEDURE — G0439 PPPS, SUBSEQ VISIT: HCPCS | Performed by: PHYSICIAN ASSISTANT

## 2024-11-15 PROCEDURE — 99214 OFFICE O/P EST MOD 30 MIN: CPT | Performed by: PHYSICIAN ASSISTANT

## 2024-11-15 PROCEDURE — 1160F RVW MEDS BY RX/DR IN RCRD: CPT | Performed by: PHYSICIAN ASSISTANT

## 2024-11-15 PROCEDURE — 85025 COMPLETE CBC W/AUTO DIFF WBC: CPT | Performed by: PHYSICIAN ASSISTANT

## 2024-11-15 PROCEDURE — 99397 PER PM REEVAL EST PAT 65+ YR: CPT | Performed by: PHYSICIAN ASSISTANT

## 2024-11-15 PROCEDURE — 36415 COLL VENOUS BLD VENIPUNCTURE: CPT | Performed by: PHYSICIAN ASSISTANT

## 2024-11-15 PROCEDURE — 1126F AMNT PAIN NOTED NONE PRSNT: CPT | Performed by: PHYSICIAN ASSISTANT

## 2024-11-15 PROCEDURE — 87088 URINE BACTERIA CULTURE: CPT | Performed by: PHYSICIAN ASSISTANT

## 2024-11-15 PROCEDURE — 80061 LIPID PANEL: CPT | Performed by: PHYSICIAN ASSISTANT

## 2024-11-15 PROCEDURE — 81003 URINALYSIS AUTO W/O SCOPE: CPT | Performed by: PHYSICIAN ASSISTANT

## 2024-11-15 PROCEDURE — 87186 SC STD MICRODIL/AGAR DIL: CPT | Performed by: PHYSICIAN ASSISTANT

## 2024-11-15 PROCEDURE — 87086 URINE CULTURE/COLONY COUNT: CPT | Performed by: PHYSICIAN ASSISTANT

## 2024-11-15 PROCEDURE — 80053 COMPREHEN METABOLIC PANEL: CPT | Performed by: PHYSICIAN ASSISTANT

## 2024-11-15 PROCEDURE — 1159F MED LIST DOCD IN RCRD: CPT | Performed by: PHYSICIAN ASSISTANT

## 2024-11-15 RX ORDER — ACETAMINOPHEN 500 MG
1 TABLET ORAL DAILY
COMMUNITY

## 2024-11-15 RX ORDER — FLUTICASONE FUROATE AND VILANTEROL TRIFENATATE 100; 25 UG/1; UG/1
1 POWDER RESPIRATORY (INHALATION)
Qty: 60 EACH | Refills: 5 | Status: SHIPPED | OUTPATIENT
Start: 2024-11-15

## 2024-11-15 RX ORDER — CELECOXIB 200 MG/1
200 CAPSULE ORAL DAILY
Qty: 90 CAPSULE | Refills: 1 | Status: SHIPPED | OUTPATIENT
Start: 2024-11-15

## 2024-11-15 RX ORDER — ALBUTEROL SULFATE 90 UG/1
2 INHALANT RESPIRATORY (INHALATION) EVERY 4 HOURS PRN
Qty: 18 G | Refills: 1 | Status: SHIPPED | OUTPATIENT
Start: 2024-11-15

## 2024-11-15 RX ORDER — NITROFURANTOIN 25; 75 MG/1; MG/1
100 CAPSULE ORAL 2 TIMES DAILY
Qty: 14 CAPSULE | Refills: 0 | Status: SHIPPED | OUTPATIENT
Start: 2024-11-15

## 2024-11-15 RX ORDER — FUROSEMIDE 20 MG/1
20 TABLET ORAL DAILY PRN
Qty: 30 TABLET | Refills: 1 | Status: SHIPPED | OUTPATIENT
Start: 2024-11-15

## 2024-11-15 NOTE — ASSESSMENT & PLAN NOTE
Chronic, stable, cont Breo and prn albuterol  Orders:    Breo Ellipta 100-25 MCG/ACT aerosol powder ; Inhale 1 puff Daily.    albuterol sulfate  (90 Base) MCG/ACT inhaler; Inhale 2 puffs Every 4 (Four) Hours As Needed for Wheezing.

## 2024-11-15 NOTE — ASSESSMENT & PLAN NOTE
Recheck labs, f/u with endo  Orders:    Comprehensive Metabolic Panel; Future    CBC Auto Differential; Future

## 2024-11-15 NOTE — ASSESSMENT & PLAN NOTE
Will get xray for eval  Orders:    XR Ankle 3+ View Left; Future    celecoxib (CeleBREX) 200 MG capsule; Take 1 capsule by mouth Daily.

## 2024-11-15 NOTE — ASSESSMENT & PLAN NOTE
Chronic, stable, cont Fosamax, calcium and vit D  Orders:    TSH Rfx On Abnormal To Free T4; Future

## 2024-11-15 NOTE — ASSESSMENT & PLAN NOTE
Refer to urology. Treat for UTI d/t abnormal U/A with macrobid.   Orders:    POC Urinalysis Dipstick, Automated    Comprehensive Metabolic Panel; Future    CBC Auto Differential; Future    Urine Culture - Urine, Urine, Clean Catch    nitrofurantoin, macrocrystal-monohydrate, (Macrobid) 100 MG capsule; Take 1 capsule by mouth 2 (Two) Times a Day.    Ambulatory Referral to Urology

## 2024-11-15 NOTE — PROGRESS NOTES
Subjective   The ABCs of the Annual Wellness Visit  Medicare Wellness Visit      Jackie Cordova is a 79 y.o. patient who presents for a Medicare Wellness Visit.    The following portions of the patient's history were reviewed and   updated as appropriate: allergies, current medications, past family history, past medical history, past social history, past surgical history, and problem list.    Compared to one year ago, the patient's physical   health is better.  Compared to one year ago, the patient's mental   health is the same.    Recent Hospitalizations:  She was not admitted to the hospital during the last year.     Current Medical Providers:  Patient Care Team:  Whitley Treadwell PA-C as PCP - General (Physician Assistant)  Ernesto Wilson DO ([facility])  Ivana Banerjee MD as Consulting Physician (Endocrinology)    Outpatient Medications Prior to Visit   Medication Sig Dispense Refill    acetaminophen (TYLENOL) 325 MG tablet Take 2 tablets by mouth Every 4 (Four) Hours As Needed for Mild Pain. (Patient taking differently: Take 2 tablets by mouth Every 6 (Six) Hours As Needed for Mild Pain.)      alendronate (Fosamax) 70 MG tablet Take 1 tablet by mouth Every 7 (Seven) Days. 12 tablet 1    Calcium Carbonate-Vit D-Min (Calcium 1200) 6154-7094 MG-UNIT chewable tablet Chew 1 each Daily. Calcium and Vitamin D      nystatin (MYCOSTATIN) 668952 UNIT/GM cream       simvastatin (ZOCOR) 40 MG tablet TAKE ONE TABLET BY MOUTH ONCE NIGHTLY 90 tablet 3    albuterol sulfate  (90 Base) MCG/ACT inhaler Inhale 2 puffs Every 4 (Four) Hours As Needed for Wheezing. 18 g 0    Breo Ellipta 100-25 MCG/ACT aerosol powder  Inhale 1 puff Daily. 60 each 5    celecoxib (CeleBREX) 200 MG capsule TAKE 1 CAPSULE BY MOUTH DAILY 90 capsule 1     No facility-administered medications prior to visit.     No opioid medication identified on active medication list. I have reviewed chart for other potential  high risk  "medication/s and harmful drug interactions in the elderly.      Aspirin is not on active medication list.  Aspirin use is not indicated based on review of current medical condition/s. Risk of harm outweighs potential benefits.  .    Patient Active Problem List   Diagnosis    Osteoporosis without current pathological fracture    Chronic obstructive pulmonary disease    Kidney cysts    Hyperlipidemia    Arthritis    Microscopic hematuria    Hypercalcemia    GERD without esophagitis    Generalized weakness    Fall    BLAYNE (acute kidney injury)    Rhabdomyolysis    Leukocytosis    Periprosthetic fracture of hip    Femur fracture, right    History of fall    Vitamin D deficiency    Urinary frequency    Constipation    Nocturnal enuresis    Lower extremity edema    Closed fracture of right wrist    Hyperparathyroidism    Chronic pain of left ankle    Chronic pain of both knees    Chronic right shoulder pain    Urinary incontinence    Medicare annual wellness visit, subsequent     Advance Care Planning Advance Directive is on file.  ACP discussion was held with the patient during this visit. Patient has an advance directive in EMR which is still valid.             Objective   Vitals:    11/15/24 0954   BP: 110/76   Pulse: 66   Resp: 20   Temp: 97.8 °F (36.6 °C)   TempSrc: Temporal   SpO2: 98%   Weight: 78.8 kg (173 lb 12.8 oz)   Height: 157.5 cm (62.01\")   PainSc: 0-No pain       Estimated body mass index is 31.78 kg/m² as calculated from the following:    Height as of this encounter: 157.5 cm (62.01\").    Weight as of this encounter: 78.8 kg (173 lb 12.8 oz).       Does the patient have evidence of cognitive impairment? No  Lab Results   Component Value Date    TRIG 94 11/15/2024    HDL 61 (H) 11/15/2024    LDL 96 11/15/2024    VLDL 17 11/15/2024                                                                                                Health  Risk Assessment    Smoking Status:  Social History     Tobacco Use   Smoking " Status Former    Current packs/day: 0.00    Average packs/day: 0.5 packs/day for 40.0 years (20.0 ttl pk-yrs)    Types: Cigarettes    Start date: 1981    Quit date:     Years since quitting: 3.9   Smokeless Tobacco Never     Alcohol Consumption:  Social History     Substance and Sexual Activity   Alcohol Use Not Currently    Comment: occasionally a glass of wine       Fall Risk Screen  DOLLYADI Fall Risk Assessment was completed, and patient is at HIGH risk for falls. Assessment completed on:11/15/2024    Depression Screening   Little interest or pleasure in doing things? Not at all   Feeling down, depressed, or hopeless? Not at all   PHQ-2 Total Score 0      Health Habits and Functional and Cognitive Screenin/9/2024     8:58 AM   Functional & Cognitive Status   Do you have difficulty preparing food and eating? No    Do you have difficulty bathing yourself, getting dressed or grooming yourself? Yes    Do you have difficulty using the toilet? No    Do you have difficulty moving around from place to place? Yes    Do you have trouble with steps or getting out of a bed or a chair? Yes    Current Diet Other    Dental Exam Unknown    Eye Exam Up to date    Exercise (times per week) 0 times per week    Current Exercises Include No Regular Exercise    Do you need help using the phone?  No    Are you deaf or do you have serious difficulty hearing?  No    Do you need help to go to places out of walking distance? Yes    Do you need help shopping? Yes    Do you need help preparing meals?  No    Do you need help with housework?  Yes    Do you need help with laundry? Yes    Do you need help taking your medications? No    Do you need help managing money? Yes    Do you ever drive or ride in a car without wearing a seat belt? No    Have you felt unusual stress, anger or loneliness in the last month? No    Who do you live with? Alone    If you need help, do you have trouble finding someone available to you? Yes     Have you been bothered in the last four weeks by sexual problems? No    Do you have difficulty concentrating, remembering or making decisions? No        Patient-reported           Age-appropriate Screening Schedule:  Refer to the list below for future screening recommendations based on patient's age, sex and/or medical conditions. Orders for these recommended tests are listed in the plan section. The patient has been provided with a written plan.    Health Maintenance List  Health Maintenance   Topic Date Due    TDAP/TD VACCINES (1 - Tdap) 05/14/2025 (Originally 4/21/1964)    ZOSTER VACCINE (2 of 2) 11/15/2025 (Originally 11/6/2024)    BMI FOLLOWUP  05/29/2025    DXA SCAN  08/09/2025    ANNUAL WELLNESS VISIT  11/15/2025    LIPID PANEL  11/15/2025    HEPATITIS C SCREENING  Completed    COVID-19 Vaccine  Completed    RSV Vaccine - Adults  Completed    INFLUENZA VACCINE  Completed    Pneumococcal Vaccine 65+  Completed    LUNG CANCER SCREENING  Discontinued                                                                                                                                                CMS Preventative Services Quick Reference  Risk Factors Identified During Encounter  Inactivity/Sedentary:  adv exercise as tolerated to promote balance and strength.  Urinary Incontinence:  will treat for urinary infection, f/u if sx persist or worsen.  Dental Screening Recommended  Vision Screening Recommended    The above risks/problems have been discussed with the patient.  Pertinent information has been shared with the patient in the After Visit Summary.  An After Visit Summary and PPPS were made available to the patient.    Follow Up:   Next Medicare Wellness visit to be scheduled in 1 year.         Additional E&M Note during same encounter follows:  Patient has additional, significant, and separately identifiable condition(s)/problem(s) that require work above and beyond the Medicare Wellness Visit     Chief  Complaint  Medicare Wellness-subsequent, Leg Swelling (Left ankle is swollen. Pt broke right leg in February. ), and Urinary Incontinence (Pt has been having urinary incontinence since she broke her leg in February. )    Subjective     Jackie is also being seen today for an annual adult preventative physical exam.  and Jackie is also being seen today for additional medical problem/s.  History of Present Illness  HLD:  Well controlled on Simvastatin 40mg. No AE  Lab Results       Component                Value               Date                       CHOL                     162                 07/31/2023                 TRIG                     69                  07/31/2023                 HDL                      66 (H)              07/31/2023                 LDL                      83                  07/31/2023              Osteoporosis:  Last dexa showed osteopenia of lumbar spine, BMD not measured at hip due to femur fracture.  Is taking Fosamax. Is now on vitamin D daily and also the weekly high dose as rxed by meme, she has repeat labs today to see if cont to have vit D def.     COPD:  On Breo daily but does not use it every day. Does not have wheeze or SOA very often, just when she gets sick with bronchitis or if she goes on a long walk. Uses alb prn a few times a month at most.     Arthritis:  She will have joint pain with rainy weather. She has pain in elbows, knees, shoulders, occ hand pain but not too bad, sometimes has knuckle swelling. Occ rt knee swelling, had sx on this knee previously. Takes tylenol pm and celebrex 200 for pain.  Was getting physical therapy to help with overall strength and mobility but ins won't cover any more at this time.   Does note some ankle pain in left ankle for a few months and more swollen than the right one. Not sure if she injured it.    LE edema:  Did not have significant issues with swelling prior to having femur fracture. Has not been as active since then but does have  "improvement in swelling if she walks around some. Has not had redness or significant worsening recently. Tried to get compression stockings but they wouldn't fit.     Low vit D/Hyperparathyroid:  Taking weekly vitamin D, is avoiding calcium supplement as her calcium had been elevated (from hyperparathyroidism). She also takes a daily vitamin D supplement. Followed by meme.    Kidney Cyst/Hematuria/Urinary incont:  Did see urology years ago, had benign w/u for hematuria w/cystoscopy, repeat BMP and u/a yearly and f/u prn. She does have some urinary frequency notable in the evenings that is getting worse. Has not had pain w/voids. No fevers                Objective   Vital Signs:  /76   Pulse 66   Temp 97.8 °F (36.6 °C) (Temporal)   Resp 20   Ht 157.5 cm (62.01\")   Wt 78.8 kg (173 lb 12.8 oz)   SpO2 98%   BMI 31.78 kg/m²   Physical Exam  Vitals reviewed.   Constitutional:       General: She is not in acute distress.     Appearance: Normal appearance.   HENT:      Head: Normocephalic and atraumatic.   Eyes:      General: No scleral icterus.     Extraocular Movements: Extraocular movements intact.      Conjunctiva/sclera: Conjunctivae normal.   Cardiovascular:      Rate and Rhythm: Normal rate and regular rhythm.      Heart sounds: Normal heart sounds. No murmur heard.  Pulmonary:      Effort: Pulmonary effort is normal. No respiratory distress.      Breath sounds: Normal breath sounds. No stridor. No wheezing or rhonchi.   Musculoskeletal:         General: Tenderness (left ankle) present.      Cervical back: Normal range of motion and neck supple.      Right lower leg: Edema present.      Left lower leg: Edema present.   Skin:     General: Skin is warm and dry.      Coloration: Skin is not jaundiced.   Neurological:      General: No focal deficit present.      Mental Status: She is alert and oriented to person, place, and time.      Gait: Gait normal.   Psychiatric:         Mood and Affect: Mood normal.    "      Behavior: Behavior normal.                       Assessment and Plan Additional age appropriate preventative wellness advice topics were discussed during today's preventative wellness exam(some topics already addressed during AWV portion of the note above):    Physical Activity: Advised cardiovascular activity 150 minutes per week as tolerated. (example brisk walk for 30 minutes, 5 days a week).     Nutrition: Discussed nutrition plan with patient. Information shared in after visit summary. Goal is for a well balanced diet to enhance overall health.     Healthy Weight: Discussed current and goal BMI with patient. Steps to attain this goal discussed. Information shared in after visit summary.      Results for orders placed or performed in visit on 11/15/24   POC Urinalysis Dipstick, Automated    Collection Time: 11/15/24 10:38 AM    Specimen: Urine   Result Value Ref Range    Color Dark Yellow Yellow, Straw, Dark Yellow, Suzan    Clarity, UA Cloudy (A) Clear    Specific Gravity  1.015 1.005 - 1.030    pH, Urine 5.5 5.0 - 8.0    Leukocytes Trace (A) Negative    Nitrite, UA Positive (A) Negative    Protein, POC Negative Negative mg/dL    Glucose, UA Negative Negative mg/dL    Ketones, UA Negative Negative    Urobilinogen, UA Normal Normal, 0.2 E.U./dL    Bilirubin Negative Negative    Blood, UA 2+ (A) Negative    Lot Number [id number]     Expiration Date 03/03/2026    Urine Culture - Urine, Urine, Clean Catch    Collection Time: 11/15/24 11:10 AM    Specimen: Urine, Clean Catch   Result Value Ref Range    Urine Culture >100,000 CFU/mL Escherichia coli (A)        Susceptibility    Escherichia coli - CHIN     Amoxicillin + Clavulanate  Susceptible ug/ml     Ampicillin  Susceptible ug/ml     Ampicillin + Sulbactam  Susceptible ug/ml     Cefepime  Susceptible ug/ml     Ceftazidime  Susceptible ug/ml     Ceftriaxone  Susceptible ug/ml     Gentamicin  Susceptible ug/ml     Levofloxacin  Susceptible ug/ml      Nitrofurantoin  Susceptible ug/ml     Piperacillin + Tazobactam  Susceptible ug/ml     Trimethoprim + Sulfamethoxazole  Susceptible ug/ml        Medicare annual wellness visit, subsequent         Urinary incontinence, unspecified type  Refer to urology. Treat for UTI d/t abnormal U/A with macrobid.   Orders:    POC Urinalysis Dipstick, Automated    Comprehensive Metabolic Panel; Future    CBC Auto Differential; Future    Urine Culture - Urine, Urine, Clean Catch    nitrofurantoin, macrocrystal-monohydrate, (Macrobid) 100 MG capsule; Take 1 capsule by mouth 2 (Two) Times a Day.    Ambulatory Referral to Urology    Chronic obstructive pulmonary disease, unspecified COPD type  Chronic, stable, cont Breo and prn albuterol  Orders:    Breo Ellipta 100-25 MCG/ACT aerosol powder ; Inhale 1 puff Daily.    albuterol sulfate  (90 Base) MCG/ACT inhaler; Inhale 2 puffs Every 4 (Four) Hours As Needed for Wheezing.    Osteoporosis without current pathological fracture, unspecified osteoporosis type  Chronic, stable, cont Fosamax, calcium and vit D  Orders:    TSH Rfx On Abnormal To Free T4; Future    Hyperparathyroidism  Recheck labs, f/u with endo  Orders:    Comprehensive Metabolic Panel; Future    CBC Auto Differential; Future    Vitamin D deficiency    Orders:    Vitamin D,25-Hydroxy; Future    Pure hypercholesterolemia   Chronic, stable, cont zocor 40  Orders:    Lipid Panel; Future    Lower extremity edema  Chronic, uncontrolled but stable, cont to try to increase activity, elevate legs in the evenings, and try compression stockings. Will rx lasix for prn use for a few days in a row a few times a month as needed. Will order venous duplex and consider referral to vascular.   Orders:    Duplex Venous Lower Extremity - Bilateral CAR; Future    furosemide (Lasix) 20 MG tablet; Take 1 tablet by mouth Daily As Needed (edema).    Chronic pain of both knees  Orders:    celecoxib (CeleBREX) 200 MG capsule; Take 1 capsule by  mouth Daily.    Chronic right shoulder pain  Orders:    celecoxib (CeleBREX) 200 MG capsule; Take 1 capsule by mouth Daily.    Chronic pain of left ankle  Will get xray for eval  Orders:    XR Ankle 3+ View Left; Future    celecoxib (CeleBREX) 200 MG capsule; Take 1 capsule by mouth Daily.    Other osteoporosis without current pathological fracture  Orders:    TSH Rfx On Abnormal To Free T4; Future    Acute cystitis with hematuria  Treat with Macrobid to cover for UTI, sent for culture  Orders:    Urine Culture - Urine, Urine, Clean Catch    nitrofurantoin, macrocrystal-monohydrate, (Macrobid) 100 MG capsule; Take 1 capsule by mouth 2 (Two) Times a Day.    Microscopic hematuria    Orders:    Ambulatory Referral to Urology            Follow Up   Return in about 6 months (around 5/15/2025) for Chronic Conditions.  Patient was given instructions and counseling regarding her condition or for health maintenance advice. Please see specific information pulled into the AVS if appropriate.

## 2024-11-15 NOTE — ASSESSMENT & PLAN NOTE
Chronic, uncontrolled but stable, cont to try to increase activity, elevate legs in the evenings, and try compression stockings. Will rx lasix for prn use for a few days in a row a few times a month as needed. Will order venous duplex and consider referral to vascular.   Orders:    Duplex Venous Lower Extremity - Bilateral CAR; Future    furosemide (Lasix) 20 MG tablet; Take 1 tablet by mouth Daily As Needed (edema).

## 2024-11-17 DIAGNOSIS — N30.01 ACUTE CYSTITIS WITH HEMATURIA: ICD-10-CM

## 2024-11-17 DIAGNOSIS — E21.3 HYPERPARATHYROIDISM: Primary | ICD-10-CM

## 2024-11-17 LAB — BACTERIA SPEC AEROBE CULT: ABNORMAL

## 2024-11-18 ENCOUNTER — TELEPHONE (OUTPATIENT)
Dept: INTERNAL MEDICINE | Facility: CLINIC | Age: 79
End: 2024-11-18
Payer: MEDICARE

## 2024-11-18 NOTE — TELEPHONE ENCOUNTER
Name: BREANNJORGEEWA      Relationship: Emergency Contact      Best Callback Number: [phone]       HUB PROVIDED THE RELAY MESSAGE FROM THE OFFICE      PATIENT: VOICED UNDERSTANDING AND HAS NO FURTHER QUESTIONS AT THIS TIME    ADDITIONAL INFORMATION:

## 2024-11-18 NOTE — TELEPHONE ENCOUNTER
HUB to relay both messages.  Labs overall looked good and stable from previous, except your calcium is a little more elevated.  We should repeat this in a month with another urine sample as a lab visit, make sure you are well-hydrated for the labs. Vitamin D is normal. Cholesterol looked good, thyroid is normal.

## 2024-11-18 NOTE — TELEPHONE ENCOUNTER
Hub to relay:  Urine showed infection, it was susceptible to the Macrobid antibiotic that we sent in, make sure to take the whole week of it and let me know if you get any new symptoms such as fevers or blood in urine.

## 2024-11-18 NOTE — TELEPHONE ENCOUNTER
----- Message from Whitley Treadwell sent at 11/17/2024  8:03 PM EST -----  Labs overall looked good and stable from previous, except your calcium is a little more elevated.  We should repeat this in a month with another urine sample as a lab visit, make sure you are well-hydrated for the labs. Vitamin D is normal. Cholesterol looked good, thyroid is normal.

## 2024-11-18 NOTE — TELEPHONE ENCOUNTER
----- Message from Whitley Treadwell sent at 11/17/2024  7:56 PM EST -----  Urine showed infection, it was susceptible to the Macrobid antibiotic that we sent in, make sure to take the whole week of it and let me know if you get any new symptoms such as fevers or blood in urine.

## 2024-12-11 ENCOUNTER — TRANSCRIBE ORDERS (OUTPATIENT)
Dept: ADMINISTRATIVE | Facility: HOSPITAL | Age: 79
End: 2024-12-11
Payer: MEDICARE

## 2024-12-11 DIAGNOSIS — Z12.31 VISIT FOR SCREENING MAMMOGRAM: Primary | ICD-10-CM

## 2024-12-17 ENCOUNTER — LAB (OUTPATIENT)
Dept: INTERNAL MEDICINE | Facility: CLINIC | Age: 79
End: 2024-12-17
Payer: MEDICARE

## 2024-12-17 DIAGNOSIS — E21.3 HYPERPARATHYROIDISM: ICD-10-CM

## 2024-12-17 LAB
CA-I SERPL ISE-MCNC: 1.35 MMOL/L (ref 1.15–1.35)
PTH-INTACT SERPL-MCNC: 66.2 PG/ML (ref 15–65)

## 2024-12-17 PROCEDURE — 82330 ASSAY OF CALCIUM: CPT | Performed by: PHYSICIAN ASSISTANT

## 2024-12-17 PROCEDURE — 36415 COLL VENOUS BLD VENIPUNCTURE: CPT | Performed by: PHYSICIAN ASSISTANT

## 2024-12-17 PROCEDURE — 83970 ASSAY OF PARATHORMONE: CPT | Performed by: PHYSICIAN ASSISTANT

## 2024-12-20 DIAGNOSIS — I87.2 VENOUS INSUFFICIENCY: ICD-10-CM

## 2024-12-20 DIAGNOSIS — R60.0 LOWER EXTREMITY EDEMA: Primary | ICD-10-CM

## 2024-12-30 DIAGNOSIS — N30.01 ACUTE CYSTITIS WITH HEMATURIA: ICD-10-CM

## 2024-12-30 LAB
BACTERIA UR QL AUTO: ABNORMAL /HPF
BILIRUB UR QL STRIP: NEGATIVE
CLARITY UR: CLEAR
COLOR UR: YELLOW
GLUCOSE UR STRIP-MCNC: NEGATIVE MG/DL
HGB UR QL STRIP.AUTO: NEGATIVE
HYALINE CASTS UR QL AUTO: ABNORMAL /LPF
KETONES UR QL STRIP: NEGATIVE
LEUKOCYTE ESTERASE UR QL STRIP.AUTO: ABNORMAL
NITRITE UR QL STRIP: NEGATIVE
PH UR STRIP.AUTO: 5.5 [PH] (ref 5–8)
PROT UR QL STRIP: NEGATIVE
RBC # UR STRIP: ABNORMAL /HPF
REF LAB TEST METHOD: ABNORMAL
SP GR UR STRIP: 1.02 (ref 1–1.03)
SQUAMOUS #/AREA URNS HPF: ABNORMAL /HPF
UROBILINOGEN UR QL STRIP: ABNORMAL
WBC # UR STRIP: ABNORMAL /HPF

## 2024-12-30 PROCEDURE — 81001 URINALYSIS AUTO W/SCOPE: CPT | Performed by: PHYSICIAN ASSISTANT

## 2024-12-30 PROCEDURE — 87077 CULTURE AEROBIC IDENTIFY: CPT | Performed by: PHYSICIAN ASSISTANT

## 2024-12-30 PROCEDURE — 87186 SC STD MICRODIL/AGAR DIL: CPT | Performed by: PHYSICIAN ASSISTANT

## 2024-12-30 PROCEDURE — 87086 URINE CULTURE/COLONY COUNT: CPT | Performed by: PHYSICIAN ASSISTANT

## 2025-01-01 LAB — BACTERIA SPEC AEROBE CULT: ABNORMAL

## 2025-01-02 DIAGNOSIS — R32 URINARY INCONTINENCE, UNSPECIFIED TYPE: ICD-10-CM

## 2025-01-02 DIAGNOSIS — N30.01 ACUTE CYSTITIS WITH HEMATURIA: ICD-10-CM

## 2025-01-02 RX ORDER — NITROFURANTOIN 25; 75 MG/1; MG/1
100 CAPSULE ORAL 2 TIMES DAILY
Qty: 14 CAPSULE | Refills: 0 | Status: SHIPPED | OUTPATIENT
Start: 2025-01-02

## 2025-01-03 ENCOUNTER — TELEPHONE (OUTPATIENT)
Dept: INTERNAL MEDICINE | Facility: CLINIC | Age: 80
End: 2025-01-03
Payer: MEDICARE

## 2025-01-03 NOTE — TELEPHONE ENCOUNTER
----- Message from Whitley Treadwell sent at 1/2/2025  6:24 PM EST -----  Urine showed infection again. I sent in Macrobid again as it is sensitive to this antibiotic still. Please drop off another urine sample in 2 weeks after you finish the antibiotic

## 2025-01-13 DIAGNOSIS — R60.0 LOWER EXTREMITY EDEMA: ICD-10-CM

## 2025-01-13 RX ORDER — FUROSEMIDE 20 MG/1
20 TABLET ORAL DAILY PRN
Qty: 30 TABLET | Refills: 0 | Status: SHIPPED | OUTPATIENT
Start: 2025-01-13

## 2025-01-13 NOTE — TELEPHONE ENCOUNTER
Rx Refill Note  Requested Prescriptions     Pending Prescriptions Disp Refills    furosemide (LASIX) 20 MG tablet [Pharmacy Med Name: FUROSEMIDE 20 MG TABLET] 30 tablet      Sig: TAKE 1 TABLET BY MOUTH DAILY AS NEEDED      Last office visit with prescribing clinician: 11/15/2024     Next office visit with prescribing clinician: 5/15/2025       Diuretics Protocol Failed    Normal serum potassium in past 12 months          Madalyn Stapleton  01/13/25, 07:45 EST

## 2025-01-21 NOTE — PROGRESS NOTES
Physical Therapy Daily Progress Note    Subjective   Jackie Cordova reports: she follows up with the doctor who did the surgery on her femur today. He wants to watch her walk.      Objective   See Exercise, Manual, and Modality Logs for complete treatment.       Assessment/Plan  Pt tolerated treatment well. She continues to require cues for foot clearance and heel strike. She also has increased difficulty with  activities. Pt would benefit from continued skilled PT.     Progress per Plan of Care           Manual Therapy:         mins  92789;  Therapeutic Exercise:    24     mins  20182;     Neuromuscular Dima:    15    mins  55570;    Therapeutic Activity:     15     mins  29663;     Gait Training:           mins  93153;     Ultrasound:          mins  60746;    Electrical Stimulation:         mins  77893 ( );  E-Stim Attended:         mins  38087  Iontophoresis          mins 81046   Traction          mins  74662  Fluidotherapy          mins  22122  Dry Needling          mins self-pay - No Charge  Paraffin          mins  93607    Timed Treatment:   54   mins   Total Treatment:     54   mins    Mare Arceo, PT, DPT  Physical Therapist  
no

## 2025-01-23 DIAGNOSIS — N30.01 ACUTE CYSTITIS WITH HEMATURIA: ICD-10-CM

## 2025-01-23 DIAGNOSIS — R32 URINARY INCONTINENCE, UNSPECIFIED TYPE: ICD-10-CM

## 2025-01-23 PROCEDURE — 87086 URINE CULTURE/COLONY COUNT: CPT | Performed by: PHYSICIAN ASSISTANT

## 2025-01-25 LAB — BACTERIA SPEC AEROBE CULT: NO GROWTH

## 2025-03-07 ENCOUNTER — HOSPITAL ENCOUNTER (OUTPATIENT)
Dept: MAMMOGRAPHY | Facility: HOSPITAL | Age: 80
Discharge: HOME | End: 2025-03-07
Admitting: PHYSICIAN ASSISTANT
Payer: MEDICARE

## 2025-03-07 DIAGNOSIS — Z12.31 VISIT FOR SCREENING MAMMOGRAM: ICD-10-CM

## 2025-03-07 PROCEDURE — 77063 BREAST TOMOSYNTHESIS BI: CPT

## 2025-03-07 PROCEDURE — 77067 SCR MAMMO BI INCL CAD: CPT

## 2025-03-10 ENCOUNTER — OFFICE VISIT (OUTPATIENT)
Dept: UROLOGY | Facility: CLINIC | Age: 80
End: 2025-03-10
Payer: MEDICARE

## 2025-03-10 VITALS — HEART RATE: 77 BPM | DIASTOLIC BLOOD PRESSURE: 76 MMHG | SYSTOLIC BLOOD PRESSURE: 111 MMHG | OXYGEN SATURATION: 96 %

## 2025-03-10 DIAGNOSIS — N32.81 OAB (OVERACTIVE BLADDER): ICD-10-CM

## 2025-03-10 DIAGNOSIS — N95.2 VAGINAL ATROPHY: ICD-10-CM

## 2025-03-10 DIAGNOSIS — N39.0 RECURRENT UTI: ICD-10-CM

## 2025-03-10 DIAGNOSIS — R31.29 MICROSCOPIC HEMATURIA: Primary | ICD-10-CM

## 2025-03-10 LAB
BILIRUB BLD-MCNC: NEGATIVE MG/DL
BILIRUB UR QL STRIP: NEGATIVE
CLARITY UR: CLEAR
CLARITY, POC: CLEAR
COLOR UR: YELLOW
COLOR UR: YELLOW
EXPIRATION DATE: ABNORMAL
GLUCOSE UR STRIP-MCNC: NEGATIVE MG/DL
GLUCOSE UR STRIP-MCNC: NEGATIVE MG/DL
HGB UR QL STRIP.AUTO: NEGATIVE
KETONES UR QL STRIP: NEGATIVE
KETONES UR QL: NEGATIVE
LEUKOCYTE EST, POC: NEGATIVE
LEUKOCYTE ESTERASE UR QL STRIP.AUTO: NEGATIVE
Lab: ABNORMAL
NITRITE UR QL STRIP: NEGATIVE
NITRITE UR-MCNC: NEGATIVE MG/ML
PH UR STRIP.AUTO: 5.5 [PH] (ref 5–8)
PH UR: 6 [PH] (ref 5–8)
PROT UR QL STRIP: NEGATIVE
PROT UR STRIP-MCNC: NEGATIVE MG/DL
RBC # UR STRIP: ABNORMAL /UL
SP GR UR STRIP: 1.02 (ref 1–1.03)
SP GR UR: 1.02 (ref 1–1.03)
UROBILINOGEN UR QL STRIP: NORMAL
UROBILINOGEN UR QL: NORMAL

## 2025-03-10 PROCEDURE — 81001 URINALYSIS AUTO W/SCOPE: CPT

## 2025-03-10 RX ORDER — ESTRADIOL 0.1 MG/G
CREAM VAGINAL
Qty: 2 G | Refills: 6 | Status: SHIPPED | OUTPATIENT
Start: 2025-03-10

## 2025-03-10 NOTE — PROGRESS NOTES
LUTS Female Office Visit      Patient Name: Jackie Cordova  : 1945   MRN: [id number]     Chief Complaint:  Lower Urinary Tract Symptoms.   Chief Complaint   Patient presents with    Blood in Urine    Urinary Incontinence       Referring Provider: Whitley Treadwell PA-C    History of Present Illness: Ms. Cordova is a 79 y.o. female with past medical history including hyperlipidemia, vitamin D deficiency, hyperparathyroidism, GERD, BLAYNE, osteoporosis, right femur fracture, COPD and generalized weakness. Patient presents to urology for recurrent UTI and microscopic hematuria.      Patient last known UTI in December. Patient had positive urine cultures from November and December + for E. coli. Patient was treated by PCP with two short courses of antibiotic. Patient reports she does not experience UTI related symptoms. Patient does report vaginal dryness that is painful.    Urinalysis negative for infection, positive for trace blood.    Patient has a history of microscopic hematuria. Patient underwent negative microscopic hematuria workup with Dr. Hudson in .  She is a former smoker, 1/2 PPD for 40 years. No family history for bladder or renal carcinoma.     Patient reports urinary incontince started two years ago after patient fell and broke right femur. Patient wears brief daily.  Patient reports occasional urinary frequency, urgency with urinary leakage. Patient reports the symptoms are bothersome and affecting quality of life. BBSQ 21.    Daily fluid intake includes 1-2 cups pf coffee, 8 ounces of milk, minimal water intake and occassional unsweet tea.     Denies dysuria, gross hematuria or nephrolithiasis. Patient denies history of breast or gynecological carcinoma.      Subjective      Review of System: Review of Systems   Genitourinary:  Positive for frequency.        Nighttime urinary incontinence   All other systems reviewed and are negative.     I have reviewed the ROS documented by my clinical  staff, I have updated appropriately and I agree. MIGUELINA Tanner    Past Medical History:  Past Medical History:   Diagnosis Date    Arthritis     Asthma ??    Uses inhalers    Cataract     Corrected    COPD (chronic obstructive pulmonary disease)     Glaucoma     Tested twice a year at eye doctor    Hyperlipidemia     Osteoporosis     Urinary incontinence ?    Started after broken leg    Vitamin D deficiency     Wrist fracture        Past Surgical History:  Past Surgical History:   Procedure Laterality Date    COLONOSCOPY      ENDOSCOPY      HIP SURGERY      LEG SURGERY Right     ORIF HIP FRACTURE Right     REPLACEMENT TOTAL KNEE         Medications:    Current Outpatient Medications:     acetaminophen (TYLENOL) 325 MG tablet, Take 2 tablets by mouth Every 4 (Four) Hours As Needed for Mild Pain. (Patient taking differently: Take 2 tablets by mouth Every 6 (Six) Hours As Needed for Mild Pain.), Disp: , Rfl:     albuterol sulfate  (90 Base) MCG/ACT inhaler, Inhale 2 puffs Every 4 (Four) Hours As Needed for Wheezing., Disp: 18 g, Rfl: 1    alendronate (Fosamax) 70 MG tablet, Take 1 tablet by mouth Every 7 (Seven) Days., Disp: 12 tablet, Rfl: 1    Breo Ellipta 100-25 MCG/ACT aerosol powder , Inhale 1 puff Daily., Disp: 60 each, Rfl: 5    Calcium Carbonate-Vit D-Min (Calcium 1200) 4092-3909 MG-UNIT chewable tablet, Chew 1 each Daily. Calcium and Vitamin D, Disp: , Rfl:     celecoxib (CeleBREX) 200 MG capsule, Take 1 capsule by mouth Daily., Disp: 90 capsule, Rfl: 1    furosemide (LASIX) 20 MG tablet, Take 1 tablet by mouth Daily As Needed (edema)., Disp: 30 tablet, Rfl: 0    nitrofurantoin, macrocrystal-monohydrate, (Macrobid) 100 MG capsule, Take 1 capsule by mouth 2 (Two) Times a Day., Disp: 14 capsule, Rfl: 0    nystatin (MYCOSTATIN) 240312 UNIT/GM cream, , Disp: , Rfl:     simvastatin (ZOCOR) 40 MG tablet, TAKE ONE TABLET BY MOUTH ONCE NIGHTLY, Disp: 90 tablet, Rfl: 3    estradiol (ESTRACE) 0.1 MG/GM  vaginal cream, Insert 2 g of vaginal cream around vaginal tissue three times weekly. Monday-Wednesday- Friday, Disp: 2 g, Rfl: 6    Vibegron 75 MG tablet, Take 1 tablet by mouth Daily., Disp: 28 tablet, Rfl: 0    Allergies:  No Known Allergies    Social History:  Social History     Socioeconomic History    Marital status:    Tobacco Use    Smoking status: Former     Current packs/day: 0.00     Average packs/day: 0.5 packs/day for 40.0 years (20.0 ttl pk-yrs)     Types: Cigarettes     Start date: 1981     Quit date:      Years since quittin.1    Smokeless tobacco: Never   Vaping Use    Vaping status: Never Used   Substance and Sexual Activity    Alcohol use: Not Currently     Comment: occasionally a glass of wine    Drug use: Never    Sexual activity: Not Currently     Partners: Male     Birth control/protection: Abstinence       Family History:  Family History   Problem Relation Age of Onset    Arthritis Mother     Cancer Father     Arthritis Sister     Arthritis Brother     Diabetes Brother     Arthritis Brother     Diabetes Brother     Arthritis Brother     Arthritis Brother     Breast cancer Neg Hx      Bladder & Bowel Symptom Questionnaire    How often do you usually urinate during the day ?   2 - About every 2-3 hours    2.   How many timed do you urinate at night?   3 - 4 times at night   3.   What is the reason that you usually urinate?   2 - Moderate urge (can delay 10-60 min)   4.   Once you get the urge to go, how long can you     comfortably delay?   3 - Less than 10 min   5.   How often do you get a sudden urge that makes you rush to the bathroom?   4 - At least once a day   6.   How often does a sudden urge to urinate result in you leaking urine or wetting pads?   4 - At least once a day   7.  In your opinion, how good is your bladder control?   3 - Poor   8.  Do you have accidental bowel leakage?   yes   9.  Do you have difficulty fully emptying your bladder?   yes   10.  Do you  experience accidental leakage when performing some physical activity such as coughing, sneezing, laughing or exercise?   yes   11. Have you tried medications to help improve your symptoms?   no   12. Would you be interested in learning about a long-lasting option that may help you with your symptoms?   yes                                                                             Total Score   21     0-7 (Mild) 8-16 (Moderate) 17-28 (Severe)        Objective     Physical Exam:   Vital Signs:   Vitals:    03/10/25 1318   BP: 111/76   Pulse: 77   SpO2: 96%     There is no height or weight on file to calculate BMI.     Physical Exam  Vitals and nursing note reviewed.   Constitutional:       Appearance: Normal appearance.   Pulmonary:      Effort: Pulmonary effort is normal.   Neurological:      Mental Status: She is alert and oriented to person, place, and time.   Psychiatric:         Mood and Affect: Mood normal.         Behavior: Behavior normal.         Labs:   Brief Urine Lab Results  (Last result in the past 365 days)        Color   Clarity   Blood   Leuk Est   Nitrite   Protein   CREAT   Urine HCG        03/10/25 1325 Yellow   Clear   1+   Negative   Negative   Negative                   Urine Culture          11/15/2024    11:10 12/30/2024    14:52 1/23/2025    15:29   Urine Culture   Urine Culture >100,000 CFU/mL Escherichia coli  >100,000 CFU/mL Escherichia coli  No growth         Lab Results   Component Value Date    GLUCOSE 82 11/15/2024    CALCIUM 11.3 (H) 11/15/2024     11/15/2024    K 5.4 (H) 11/15/2024    CO2 25.0 11/15/2024     11/15/2024    BUN 29 (H) 11/15/2024    CREATININE 0.85 11/15/2024    BCR 34.1 (H) 11/15/2024    ANIONGAP 10.0 11/15/2024       Lab Results   Component Value Date    WBC 9.71 11/15/2024    HGB 12.6 11/15/2024    HCT 38.3 11/15/2024    MCV 90.8 11/15/2024     11/15/2024       Images:   Mammo Screening Digital Tomosynthesis Bilateral With CAD  Result Date:  3/10/2025  No suspicious abnormality identified.  OVERALL ASSESSMENT: ACR BI-RADS CATEGORY: 1, NEGATIVE:  Recommend continued routine annual screening mammogram.  The standard false-negative rate of mammography is between 10% and 25%. Complex patterns or increased breast density will markedly elevate the false-negative rate of mammography.   A letter, in lay terminology, with the results of this exam will be mailed to the patient.   3/10/2025 11:49 AM by Elodia Ko MD on Workstation: [email or web address]        Measures:   Tobacco:   Jackie Cordova  reports that she quit smoking about 4 years ago. Her smoking use included cigarettes. She started smoking about 44 years ago. She has a 20 pack-year smoking history. She has never used smokeless tobacco.            Urine Incontinence: Patient reports that she is currently experiencing symptoms of urinary incontinence.       Assessment / Plan      Assessment:  Mrs. Cordova is a 79 y.o. female who presented today with recurrent UTI and laparoscopic hematuria.  Urinalysis is negative for infection today.  We will send urine for guidance PCR to assess for uropathogens. I will notify patient's sister of results once available. I have advised patient to begin vaginal estrogen cream three times weekly for UTI prophylaxis. Urinalysis positive for trace blood I will send urine for UA micro for further evaluation of RBCs. I will notify with results once available.    We discussed conservative strategies to improve OAB related symptoms.  Patient will increase water intake and decrease bladder irritants such as coffee and tea. Have discussed initiating beta 3 agonist, Gemtesa. Patient and patient's sister are understanding and agreeable with plan of care. We will follow-up in 6 weeks for symptom check.      Diagnoses and all orders for this visit:    1. Microscopic hematuria (Primary)  -     Urinalysis With Microscopic - Urine, Clean Catch; Future  -     POC Urinalysis Dipstick,  Automated    2. Vaginal atrophy  -     estradiol (ESTRACE) 0.1 MG/GM vaginal cream; Insert 2 g of vaginal cream around vaginal tissue three times weekly. Monday-Wednesday- Friday  Dispense: 2 g; Refill: 6    3. OAB (overactive bladder)  -     Vibegron 75 MG tablet; Take 1 tablet by mouth Daily.  Dispense: 28 tablet; Refill: 0    4. Recurrent UTI  -     estradiol (ESTRACE) 0.1 MG/GM vaginal cream; Insert 2 g of vaginal cream around vaginal tissue three times weekly. Monday-Wednesday- Friday  Dispense: 2 g; Refill: 6           Follow Up:   Return in about 6 weeks (around 4/21/2025).    I spent approximately 45 minutes providing clinical care for this patient; including review of patient's chart and provider documentation, face to face time spent with patient in examination room (obtaining history, performing physical exam, discussing diagnosis and management options), placing orders, and completing patient documentation.     MIGUELINA Tanner  [facility] [place]

## 2025-03-11 LAB
BACTERIA UR QL AUTO: ABNORMAL /HPF
HYALINE CASTS UR QL AUTO: ABNORMAL /LPF
NCCN CRITERIA FLAG: NORMAL
RBC # UR STRIP: ABNORMAL /HPF
REF LAB TEST METHOD: ABNORMAL
SQUAMOUS #/AREA URNS HPF: ABNORMAL /HPF
TYRER CUZICK SCORE: 2.5
WBC # UR STRIP: ABNORMAL /HPF

## 2025-04-23 ENCOUNTER — OFFICE VISIT (OUTPATIENT)
Dept: UROLOGY | Facility: CLINIC | Age: 80
End: 2025-04-23
Payer: MEDICARE

## 2025-04-23 ENCOUNTER — TELEPHONE (OUTPATIENT)
Dept: UROLOGY | Facility: CLINIC | Age: 80
End: 2025-04-23

## 2025-04-23 DIAGNOSIS — R31.29 MICROSCOPIC HEMATURIA: ICD-10-CM

## 2025-04-23 DIAGNOSIS — N32.81 OAB (OVERACTIVE BLADDER): Primary | ICD-10-CM

## 2025-04-23 DIAGNOSIS — N39.0 RECURRENT UTI: Primary | ICD-10-CM

## 2025-04-23 DIAGNOSIS — N32.81 OAB (OVERACTIVE BLADDER): ICD-10-CM

## 2025-04-23 LAB
BILIRUB BLD-MCNC: NEGATIVE MG/DL
CLARITY, POC: CLEAR
COLOR UR: ABNORMAL
EXPIRATION DATE: ABNORMAL
GLUCOSE UR STRIP-MCNC: NEGATIVE MG/DL
KETONES UR QL: NEGATIVE
LEUKOCYTE EST, POC: NEGATIVE
Lab: ABNORMAL
NITRITE UR-MCNC: NEGATIVE MG/ML
PH UR: 5.5 [PH] (ref 5–8)
PROT UR STRIP-MCNC: NEGATIVE MG/DL
RBC # UR STRIP: ABNORMAL /UL
SP GR UR: 1.01 (ref 1–1.03)
UROBILINOGEN UR QL: NORMAL

## 2025-04-23 PROCEDURE — 81001 URINALYSIS AUTO W/SCOPE: CPT

## 2025-04-23 RX ORDER — ADHESIVE TAPE 3"X 2.3 YD
500 TAPE, NON-MEDICATED TOPICAL DAILY
Qty: 28 EACH | Refills: 0 | COMMUNITY
Start: 2025-04-23

## 2025-04-23 NOTE — PROGRESS NOTES
LUTS Female Office Visit      Patient Name: Jackie Cordova  : 1945   MRN: [id number]     Chief Complaint:  Lower Urinary Tract Symptoms.   Chief Complaint   Patient presents with    Recurrent UTI    Overactive bladder    Blood in Urine     microscopic       Referring Provider: No ref. provider found    History of Present Illness: Ms. Cordova is a 80 y.o. female with history lower urinary tract symptoms.    Patient last seen in our office on 3/10/25 with urinary incontinence. Patient reported occasional urinary frequency, urgency with urinary leakage. Patient reported the symptoms were bothersome and affecting quality of life.  Patient completed 4-week trial of beta 3 agonist, Gemtesa. Patient reports significant symptom improvement stating decreased urinary frequency and urgency with minimal urinary leakage.  Patient wears pads daily instead of adult briefs while taking Gemtesa.    Patient unable to take anticholinergic due to chronic constipation and dry mouth.  Patient takes daily MiraLAX and as needed stool softener to aid with constipation.    Patient reports improvement with vaginal atrophy and UTI related symptoms with vaginal estrogen cream. Urinalysis today is negative for infection. Positive for +1 blood.    Patient reports she was overall very happy with symptom improvement.    Subjective      Review of System: Review of Systems   Genitourinary:  Positive for frequency and urgency.   All other systems reviewed and are negative.     I have reviewed the ROS documented by my clinical staff, I have updated appropriately and I agree. MIGUELINA Tanner    Past Medical History:  Past Medical History:   Diagnosis Date    Arthritis     Asthma ??    Uses inhalers    Cataract     Corrected    COPD (chronic obstructive pulmonary disease)     Glaucoma     Tested twice a year at eye doctor    Hyperlipidemia     Osteoporosis     Urinary incontinence ?    Started after broken leg    Urinary tract infection  2023    Vitamin D deficiency     Wrist fracture        Past Surgical History:  Past Surgical History:   Procedure Laterality Date    COLONOSCOPY      ENDOSCOPY      HIP SURGERY      LEG SURGERY Right     ORIF HIP FRACTURE Right     REPLACEMENT TOTAL KNEE         Medications:    Current Outpatient Medications:     albuterol sulfate  (90 Base) MCG/ACT inhaler, Inhale 2 puffs Every 4 (Four) Hours As Needed for Wheezing., Disp: 18 g, Rfl: 1    alendronate (Fosamax) 70 MG tablet, Take 1 tablet by mouth Every 7 (Seven) Days., Disp: 12 tablet, Rfl: 1    Breo Ellipta 100-25 MCG/ACT aerosol powder , Inhale 1 puff Daily., Disp: 60 each, Rfl: 5    Calcium Carbonate-Vit D-Min (Calcium 1200) 0353-4764 MG-UNIT chewable tablet, Chew 1 each Daily. Calcium and Vitamin D, Disp: , Rfl:     celecoxib (CeleBREX) 200 MG capsule, Take 1 capsule by mouth Daily., Disp: 90 capsule, Rfl: 1    estradiol (ESTRACE) 0.1 MG/GM vaginal cream, Insert 2 g of vaginal cream around vaginal tissue three times weekly. Monday-Wednesday- Friday, Disp: 2 g, Rfl: 6    furosemide (LASIX) 20 MG tablet, Take 1 tablet by mouth Daily As Needed (edema)., Disp: 30 tablet, Rfl: 0    nitrofurantoin, macrocrystal-monohydrate, (Macrobid) 100 MG capsule, Take 1 capsule by mouth 2 (Two) Times a Day., Disp: 14 capsule, Rfl: 0    nystatin (MYCOSTATIN) 150001 UNIT/GM cream, , Disp: , Rfl:     simvastatin (ZOCOR) 40 MG tablet, TAKE ONE TABLET BY MOUTH ONCE NIGHTLY, Disp: 90 tablet, Rfl: 3    Cranberry 500 MG capsule, Take 500 mg by mouth Daily., Disp: 28 each, Rfl: 0    Vibegron 75 MG tablet, Take 1 tablet by mouth Daily., Disp: 30 tablet, Rfl: 2    Allergies:  No Known Allergies    Social History:  Social History     Socioeconomic History    Marital status:    Tobacco Use    Smoking status: Former     Current packs/day: 0.00     Average packs/day: 0.5 packs/day for 40.0 years (20.0 ttl pk-yrs)     Types: Cigarettes     Start date: 1/1/1981     Quit date: 2021      Years since quittin.3    Smokeless tobacco: Never   Vaping Use    Vaping status: Never Used   Substance and Sexual Activity    Alcohol use: Not Currently     Comment: occasionally a glass of wine    Drug use: Never    Sexual activity: Not Currently     Partners: Male     Birth control/protection: Abstinence       Family History:  Family History   Problem Relation Age of Onset    Arthritis Mother     Cancer Father     Arthritis Sister     Arthritis Brother     Diabetes Brother     Arthritis Brother     Diabetes Brother     Arthritis Brother     Arthritis Brother     Breast cancer Neg Hx      Bladder & Bowel Symptom Questionnaire    How often do you usually urinate during the day ?   2 - About every 2-3 hours    2.   How many timed do you urinate at night?   2 - 3 times at night   3.   What is the reason that you usually urinate?   2 - Moderate urge (can delay 10-60 min)   4.   Once you get the urge to go, how long can you     comfortably delay?   1 - 30-60 min   5.   How often do you get a sudden urge that makes you rush to the bathroom?   3 - A few times a week   6.   How often does a sudden urge to urinate result in you leaking urine or wetting pads?   4 - At least once a day   7.  In your opinion, how good is your bladder control?   2 - Good   8.  Do you have accidental bowel leakage?   no   9.  Do you have difficulty fully emptying your bladder?   no   10.  Do you experience accidental leakage when performing some physical activity such as coughing, sneezing, laughing or exercise?   yes   11. Have you tried medications to help improve your symptoms?   yes   12. Would you be interested in learning about a long-lasting option that may help you with your symptoms?   no                                                                             Total Score   16     0-7 (Mild) 8-16 (Moderate) 17-28 (Severe)        Post void residual bladder scan:    0 cc     Objective     Physical Exam:   Vital Signs: There  were no vitals filed for this visit.  There is no height or weight on file to calculate BMI.     Physical Exam  Vitals and nursing note reviewed.   Constitutional:       Appearance: Normal appearance.   Pulmonary:      Effort: Pulmonary effort is normal.   Neurological:      Mental Status: She is alert and oriented to person, place, and time.   Psychiatric:         Mood and Affect: Mood normal.         Behavior: Behavior normal.         Labs:   Brief Urine Lab Results  (Last result in the past 365 days)        Color   Clarity   Blood   Leuk Est   Nitrite   Protein   CREAT   Urine HCG        04/23/25 1123 Straw   Clear   1+   Negative   Negative   Negative                   Urine Culture          11/15/2024    11:10 12/30/2024    14:52 1/23/2025    15:29   Urine Culture   Urine Culture >100,000 CFU/mL Escherichia coli  >100,000 CFU/mL Escherichia coli  No growth         Lab Results   Component Value Date    GLUCOSE 82 11/15/2024    CALCIUM 11.3 (H) 11/15/2024     11/15/2024    K 5.4 (H) 11/15/2024    CO2 25.0 11/15/2024     11/15/2024    BUN 29 (H) 11/15/2024    CREATININE 0.85 11/15/2024    BCR 34.1 (H) 11/15/2024    ANIONGAP 10.0 11/15/2024       Lab Results   Component Value Date    WBC 9.71 11/15/2024    HGB 12.6 11/15/2024    HCT 38.3 11/15/2024    MCV 90.8 11/15/2024     11/15/2024       Images:   Mammo Screening Digital Tomosynthesis Bilateral With CAD  Result Date: 3/10/2025  No suspicious abnormality identified.  OVERALL ASSESSMENT: ACR BI-RADS CATEGORY: 1, NEGATIVE:  Recommend continued routine annual screening mammogram.  The standard false-negative rate of mammography is between 10% and 25%. Complex patterns or increased breast density will markedly elevate the false-negative rate of mammography.   A letter, in lay terminology, with the results of this exam will be mailed to the patient.   3/10/2025 11:49 AM by Elodia Ko MD on Workstation: [id number]        Measures:   Tobacco:    Jackie Cordova  reports that she quit smoking about 4 years ago. Her smoking use included cigarettes. She started smoking about 44 years ago. She has a 20 pack-year smoking history. She has never used smokeless tobacco.        Urine Incontinence: Patient reports that she is not currently experiencing any symptoms of urinary incontinence.       Assessment / Plan      Assessment:  Mrs. Cordova is a 80 y.o. female who presented today with lower urinary tract symptoms including urinary frequency, urgency with incontinence. Patient reports significant lower urinary tract symptom improvement while taking 4-week trial of beta 3 agonist, Gemtesa. Patient would like to proceed with formal prescription of Gemtesa. We will trial TheraCran max daily for UTI prophylaxis. Urinalysis today is negative for infection.  Positive for +1 blood we will send urinalysis for UA micro to further assess red blood cells. I will notify patient once results are available. Patient is understanding and agreeable plan of care. She will alert with questions or concerns in the interim. We will follow-up in 3 months for symptom check.        Diagnoses and all orders for this visit:    1. Recurrent UTI (Primary)  -     POC Urinalysis Dipstick, Automated  -     Cranberry 500 MG capsule; Take 500 mg by mouth Daily.  Dispense: 28 each; Refill: 0    2. OAB (overactive bladder)  -     Vibegron 75 MG tablet; Take 1 tablet by mouth Daily.  Dispense: 30 tablet; Refill: 2    3. Microscopic hematuria  -     Urinalysis With Microscopic - Urine, Clean Catch; Future  -     Urinalysis With Microscopic - Urine, Clean Catch           Follow Up:   Return in about 3 months (around 7/23/2025).    I spent approximately 30 minutes providing clinical care for this patient; including review of patient's chart and provider documentation, face to face time spent with patient in examination room (obtaining history, performing physical exam, discussing diagnosis and management  options), placing orders, and completing patient documentation.     MIGUELINA Tanner  [facility] [place] [place] [facility] [place]

## 2025-04-23 NOTE — TELEPHONE ENCOUNTER
Caller: EWA    Relationship: SISTER    Best call back number: [phone]    Which medication are you concerned about: GEMTESA    Who prescribed you this medication: JOSE GIL    When did you start taking this medication: N/A    What are your concerns: SAYS GEMTESA COSTS $200.    How long have you had these concerns: TODAY

## 2025-04-24 LAB
BACTERIA UR QL AUTO: ABNORMAL /HPF
BILIRUB UR QL STRIP: NEGATIVE
CLARITY UR: CLEAR
COLOR UR: YELLOW
GLUCOSE UR STRIP-MCNC: NEGATIVE MG/DL
HGB UR QL STRIP.AUTO: NEGATIVE
HYALINE CASTS UR QL AUTO: ABNORMAL /LPF
KETONES UR QL STRIP: NEGATIVE
LEUKOCYTE ESTERASE UR QL STRIP.AUTO: ABNORMAL
NITRITE UR QL STRIP: NEGATIVE
PH UR STRIP.AUTO: 5.5 [PH] (ref 5–8)
PROT UR QL STRIP: NEGATIVE
RBC # UR STRIP: ABNORMAL /HPF
REF LAB TEST METHOD: ABNORMAL
SP GR UR STRIP: 1.01 (ref 1–1.03)
SQUAMOUS #/AREA URNS HPF: ABNORMAL /HPF
UROBILINOGEN UR QL STRIP: ABNORMAL
WBC # UR STRIP: ABNORMAL /HPF

## 2025-05-14 DIAGNOSIS — M25.511 CHRONIC RIGHT SHOULDER PAIN: ICD-10-CM

## 2025-05-14 DIAGNOSIS — M25.562 CHRONIC PAIN OF BOTH KNEES: ICD-10-CM

## 2025-05-14 DIAGNOSIS — M25.572 CHRONIC PAIN OF LEFT ANKLE: ICD-10-CM

## 2025-05-14 DIAGNOSIS — M25.561 CHRONIC PAIN OF BOTH KNEES: ICD-10-CM

## 2025-05-14 DIAGNOSIS — G89.29 CHRONIC PAIN OF BOTH KNEES: ICD-10-CM

## 2025-05-14 DIAGNOSIS — G89.29 CHRONIC PAIN OF LEFT ANKLE: ICD-10-CM

## 2025-05-14 DIAGNOSIS — G89.29 CHRONIC RIGHT SHOULDER PAIN: ICD-10-CM

## 2025-05-15 ENCOUNTER — OFFICE VISIT (OUTPATIENT)
Dept: INTERNAL MEDICINE | Facility: CLINIC | Age: 80
End: 2025-05-15
Payer: MEDICARE

## 2025-05-15 ENCOUNTER — LAB (OUTPATIENT)
Dept: INTERNAL MEDICINE | Facility: CLINIC | Age: 80
End: 2025-05-15
Payer: MEDICARE

## 2025-05-15 VITALS
HEART RATE: 67 BPM | OXYGEN SATURATION: 98 % | WEIGHT: 174 LBS | HEIGHT: 62 IN | TEMPERATURE: 97.7 F | BODY MASS INDEX: 32.02 KG/M2 | RESPIRATION RATE: 20 BRPM | DIASTOLIC BLOOD PRESSURE: 64 MMHG | SYSTOLIC BLOOD PRESSURE: 112 MMHG

## 2025-05-15 DIAGNOSIS — M25.561 CHRONIC PAIN OF BOTH KNEES: ICD-10-CM

## 2025-05-15 DIAGNOSIS — N32.81 OAB (OVERACTIVE BLADDER): ICD-10-CM

## 2025-05-15 DIAGNOSIS — G89.29 CHRONIC PAIN OF LEFT ANKLE: ICD-10-CM

## 2025-05-15 DIAGNOSIS — E21.3 HYPERPARATHYROIDISM: ICD-10-CM

## 2025-05-15 DIAGNOSIS — E78.00 PURE HYPERCHOLESTEROLEMIA: ICD-10-CM

## 2025-05-15 DIAGNOSIS — M25.572 CHRONIC PAIN OF LEFT ANKLE: ICD-10-CM

## 2025-05-15 DIAGNOSIS — E55.9 VITAMIN D DEFICIENCY: ICD-10-CM

## 2025-05-15 DIAGNOSIS — J44.9 CHRONIC OBSTRUCTIVE PULMONARY DISEASE, UNSPECIFIED COPD TYPE: ICD-10-CM

## 2025-05-15 DIAGNOSIS — G89.29 CHRONIC RIGHT SHOULDER PAIN: ICD-10-CM

## 2025-05-15 DIAGNOSIS — M25.511 CHRONIC RIGHT SHOULDER PAIN: ICD-10-CM

## 2025-05-15 DIAGNOSIS — G89.29 CHRONIC PAIN OF LEFT KNEE: Primary | ICD-10-CM

## 2025-05-15 DIAGNOSIS — R60.0 LOWER EXTREMITY EDEMA: ICD-10-CM

## 2025-05-15 DIAGNOSIS — G89.29 CHRONIC PAIN OF BOTH KNEES: ICD-10-CM

## 2025-05-15 DIAGNOSIS — M25.562 CHRONIC PAIN OF LEFT KNEE: Primary | ICD-10-CM

## 2025-05-15 DIAGNOSIS — M25.562 CHRONIC PAIN OF BOTH KNEES: ICD-10-CM

## 2025-05-15 DIAGNOSIS — I87.2 VENOUS INSUFFICIENCY: ICD-10-CM

## 2025-05-15 DIAGNOSIS — E83.52 HYPERCALCEMIA: ICD-10-CM

## 2025-05-15 DIAGNOSIS — M81.0 OSTEOPOROSIS WITHOUT CURRENT PATHOLOGICAL FRACTURE, UNSPECIFIED OSTEOPOROSIS TYPE: ICD-10-CM

## 2025-05-15 LAB
ALBUMIN SERPL-MCNC: 4.1 G/DL (ref 3.5–5.2)
ALBUMIN/GLOB SERPL: 1.2 G/DL
ALP SERPL-CCNC: 91 U/L (ref 39–117)
ALT SERPL W P-5'-P-CCNC: 27 U/L (ref 1–33)
ANION GAP SERPL CALCULATED.3IONS-SCNC: 12.1 MMOL/L (ref 5–15)
AST SERPL-CCNC: 37 U/L (ref 1–32)
BASOPHILS # BLD AUTO: 0.04 10*3/MM3 (ref 0–0.2)
BASOPHILS NFR BLD AUTO: 0.5 % (ref 0–1.5)
BILIRUB SERPL-MCNC: 0.4 MG/DL (ref 0–1.2)
BUN SERPL-MCNC: 23 MG/DL (ref 8–23)
BUN/CREAT SERPL: 26.1 (ref 7–25)
CALCIUM SPEC-SCNC: 10.3 MG/DL (ref 8.6–10.5)
CHLORIDE SERPL-SCNC: 103 MMOL/L (ref 98–107)
CO2 SERPL-SCNC: 23.9 MMOL/L (ref 22–29)
CREAT SERPL-MCNC: 0.88 MG/DL (ref 0.57–1)
DEPRECATED RDW RBC AUTO: 43.4 FL (ref 37–54)
EGFRCR SERPLBLD CKD-EPI 2021: 66.9 ML/MIN/1.73
EOSINOPHIL # BLD AUTO: 0.35 10*3/MM3 (ref 0–0.4)
EOSINOPHIL NFR BLD AUTO: 4.8 % (ref 0.3–6.2)
ERYTHROCYTE [DISTWIDTH] IN BLOOD BY AUTOMATED COUNT: 12.9 % (ref 12.3–15.4)
GLOBULIN UR ELPH-MCNC: 3.3 GM/DL
GLUCOSE SERPL-MCNC: 71 MG/DL (ref 65–99)
HCT VFR BLD AUTO: 36.6 % (ref 34–46.6)
HGB BLD-MCNC: 12.2 G/DL (ref 12–15.9)
IMM GRANULOCYTES # BLD AUTO: 0.01 10*3/MM3 (ref 0–0.05)
IMM GRANULOCYTES NFR BLD AUTO: 0.1 % (ref 0–0.5)
LYMPHOCYTES # BLD AUTO: 2.06 10*3/MM3 (ref 0.7–3.1)
LYMPHOCYTES NFR BLD AUTO: 28.2 % (ref 19.6–45.3)
MCH RBC QN AUTO: 30.9 PG (ref 26.6–33)
MCHC RBC AUTO-ENTMCNC: 33.3 G/DL (ref 31.5–35.7)
MCV RBC AUTO: 92.7 FL (ref 79–97)
MONOCYTES # BLD AUTO: 0.73 10*3/MM3 (ref 0.1–0.9)
MONOCYTES NFR BLD AUTO: 10 % (ref 5–12)
NEUTROPHILS NFR BLD AUTO: 4.11 10*3/MM3 (ref 1.7–7)
NEUTROPHILS NFR BLD AUTO: 56.4 % (ref 42.7–76)
NRBC BLD AUTO-RTO: 0 /100 WBC (ref 0–0.2)
PLATELET # BLD AUTO: 338 10*3/MM3 (ref 140–450)
PMV BLD AUTO: 10.1 FL (ref 6–12)
POTASSIUM SERPL-SCNC: 5 MMOL/L (ref 3.5–5.2)
PROT SERPL-MCNC: 7.4 G/DL (ref 6–8.5)
RBC # BLD AUTO: 3.95 10*6/MM3 (ref 3.77–5.28)
SODIUM SERPL-SCNC: 139 MMOL/L (ref 136–145)
WBC NRBC COR # BLD AUTO: 7.3 10*3/MM3 (ref 3.4–10.8)

## 2025-05-15 PROCEDURE — 83970 ASSAY OF PARATHORMONE: CPT | Performed by: PHYSICIAN ASSISTANT

## 2025-05-15 PROCEDURE — 85025 COMPLETE CBC W/AUTO DIFF WBC: CPT | Performed by: PHYSICIAN ASSISTANT

## 2025-05-15 PROCEDURE — 80053 COMPREHEN METABOLIC PANEL: CPT | Performed by: PHYSICIAN ASSISTANT

## 2025-05-15 PROCEDURE — 36415 COLL VENOUS BLD VENIPUNCTURE: CPT | Performed by: PHYSICIAN ASSISTANT

## 2025-05-15 PROCEDURE — 82306 VITAMIN D 25 HYDROXY: CPT | Performed by: PHYSICIAN ASSISTANT

## 2025-05-15 RX ORDER — CELECOXIB 200 MG/1
200 CAPSULE ORAL DAILY
Qty: 90 CAPSULE | Refills: 1 | OUTPATIENT
Start: 2025-05-15

## 2025-05-15 RX ORDER — FLUTICASONE FUROATE AND VILANTEROL TRIFENATATE 100; 25 UG/1; UG/1
1 POWDER RESPIRATORY (INHALATION)
Qty: 60 EACH | Refills: 5 | Status: SHIPPED | OUTPATIENT
Start: 2025-05-15

## 2025-05-15 RX ORDER — CELECOXIB 200 MG/1
200 CAPSULE ORAL DAILY
Qty: 90 CAPSULE | Refills: 1 | Status: SHIPPED | OUTPATIENT
Start: 2025-05-15

## 2025-05-15 RX ORDER — ALBUTEROL SULFATE 90 UG/1
2 INHALANT RESPIRATORY (INHALATION) EVERY 4 HOURS PRN
Qty: 18 G | Refills: 1 | Status: SHIPPED | OUTPATIENT
Start: 2025-05-15

## 2025-05-15 RX ORDER — ALENDRONATE SODIUM 70 MG/1
70 TABLET ORAL
Qty: 12 TABLET | Refills: 1 | Status: SHIPPED | OUTPATIENT
Start: 2025-05-15

## 2025-05-15 NOTE — ASSESSMENT & PLAN NOTE
Lasix prn, Refer to vascular, reorder u/s bilat lower legs, adv to use compression stockings, elevate legs when sitting but adv to walk periodically to help blood flow.

## 2025-05-15 NOTE — PROGRESS NOTES
Answers submitted by the patient for this visit:  Problem not listed (Submitted on 2025)  Chief Complaint: Other medical problem  Reason for appointment: Checkup  abdominal pain: No  anorexia: No  joint pain: Yes  change in stool: Yes  chest pain: No  chills: Yes  nasal congestion: No  cough: No  diaphoresis: Yes  fatigue: No  fever: No  headaches: No  joint swelling: Yes  myalgias: No  nausea: No  neck pain: No  numbness: No  rash: Yes  sore throat: No  swollen glands: No  dysuria: No  vertigo: No  visual change: No  vomiting: No  weakness: No  Onset: 1 to 5 years  Chronicity: recurrent  Medications tried: On medication , Celecoxib,Simvastatin  Additional information: Gemtesa, sample provided by Urologist. Can not afford subscription      Office Note     Name: Jackie Cordova    : 1945     MRN: [id number]     Chief Complaint  COPD and Hyperlipidemia    Subjective   History of Present Illness  Jackie Cordova presents to [facility] for   History of Present Illness  HLD:  Well controlled on Simvastatin 40mg. No AE  Lab Results       Component                Value               Date                       CHOL                     162                 2023                 TRIG                     69                  2023                 HDL                      66 (H)              2023                 LDL                      83                  2023              Osteoporosis:  Last dexa showed osteopenia of lumbar spine, BMD not measured at hip due to femur fracture.  Is taking Fosamax. Is now on vitamin D daily and also the weekly high dose as rxed by endo.     COPD:  On Breo daily but does not use it every day. Does not have wheeze or SOA very often, just when she gets sick with bronchitis or if she goes on a long walk. Uses alb prn a few times a month at most.     Arthritis:  She is having worsening left knee pain, has had bilat knee pain for years, did  get joint inj in the past years ago ago in left knee and it helped.   She will have joint pain with rainy weather. She has pain in elbows, knees, shoulders, occ hand pain but not too bad, sometimes has knuckle swelling. Occ rt knee swelling, had sx on this knee previously. Takes tylenol pm and celebrex 200 for pain.  Was getting physical therapy to help with overall strength and mobility but ins won't cover any more at this time.   Does note some ankle pain in left ankle for a few months and more swollen than the right one. Not sure if she injured it.    LE edema:  Did not have significant issues with swelling prior to having femur fracture. Has not been as active since then but does have improvement in swelling if she walks around some. Has not had redness or significant worsening recently. Tried to get compression stockings but she couldn't get them on well.  U/s was ordered of legs bilat but not done yet. Does have tenderness in both legs    Low vit D/Hyperparathyroid:  Taking weekly vitamin D, is avoiding calcium supplement as her calcium had been elevated (from hyperparathyroidism). She also takes a daily vitamin D supplement. Followed by endo.    Urinary incont:  Followed by urology, doing well on Gemtessa but it is expensive.   had benign w/u for hematuria w/cystoscopy in the past, most recent u/a no blood noted with urology.    The following portions of the patient's history were reviewed and updated as appropriate: allergies, current medications, past family history, past medical history, past social history, past surgical history and problem list.  No Known Allergies    Current Outpatient Medications:   •  albuterol sulfate  (90 Base) MCG/ACT inhaler, Inhale 2 puffs Every 4 (Four) Hours As Needed for Wheezing., Disp: 18 g, Rfl: 1  •  alendronate (Fosamax) 70 MG tablet, Take 1 tablet by mouth Every 7 (Seven) Days., Disp: 12 tablet, Rfl: 1  •  Breo Ellipta 100-25 MCG/ACT aerosol powder , Inhale 1 puff  "Daily., Disp: 60 each, Rfl: 5  •  Calcium Carbonate-Vit D-Min (Calcium 1200) 9595-2399 MG-UNIT chewable tablet, Chew 1 each Daily. Calcium and Vitamin D, Disp: , Rfl:   •  celecoxib (CeleBREX) 200 MG capsule, Take 1 capsule by mouth Daily., Disp: 90 capsule, Rfl: 1  •  Cranberry 500 MG capsule, Take 500 mg by mouth Daily., Disp: 28 each, Rfl: 0  •  estradiol (ESTRACE) 0.1 MG/GM vaginal cream, Insert 2 g of vaginal cream around vaginal tissue three times weekly. Monday-Wednesday- Friday, Disp: 2 g, Rfl: 6  •  furosemide (LASIX) 20 MG tablet, Take 1 tablet by mouth Daily As Needed (edema)., Disp: 30 tablet, Rfl: 0  •  nystatin (MYCOSTATIN) 904200 UNIT/GM cream, , Disp: , Rfl:   •  simvastatin (ZOCOR) 40 MG tablet, TAKE ONE TABLET BY MOUTH ONCE NIGHTLY, Disp: 90 tablet, Rfl: 3  •  Vibegron 75 MG tablet, Take 1 tablet by mouth Daily., Disp: 30 tablet, Rfl: 2  Social History     Tobacco Use   Smoking Status Former   • Current packs/day: 0.00   • Average packs/day: 0.5 packs/day for 40.0 years (20.0 ttl pk-yrs)   • Types: Cigarettes   • Start date: 1981   • Quit date:    • Years since quittin.3   Smokeless Tobacco Never        Objective   Vital Signs:   Vitals:    05/15/25 1019   BP: 112/64   Pulse: 67   Resp: 20   Temp: 97.7 °F (36.5 °C)   TempSrc: Temporal   SpO2: 98%   Weight: 78.9 kg (174 lb)   Height: 157.5 cm (62.01\")   PainSc: 0-No pain      Body mass index is 31.81 kg/m².  Physical Exam  Vitals reviewed.   Constitutional:       General: She is not in acute distress.     Appearance: Normal appearance.   HENT:      Head: Normocephalic and atraumatic.   Eyes:      General: No scleral icterus.     Extraocular Movements: Extraocular movements intact.      Conjunctiva/sclera: Conjunctivae normal.   Cardiovascular:      Rate and Rhythm: Normal rate and regular rhythm.      Heart sounds: Normal heart sounds. No murmur heard.  Pulmonary:      Effort: Pulmonary effort is normal. No respiratory distress.      " Breath sounds: Normal breath sounds. No stridor. No wheezing or rhonchi.   Musculoskeletal:         General: Tenderness present.      Cervical back: Normal range of motion and neck supple.      Right lower leg: Edema present.      Left lower leg: Edema present.      Comments: Lft knee swelling   Skin:     General: Skin is warm and dry.      Coloration: Skin is not jaundiced.   Neurological:      General: No focal deficit present.      Mental Status: She is alert and oriented to person, place, and time.      Gait: Gait normal.   Psychiatric:         Mood and Affect: Mood normal.         Behavior: Behavior normal.        No LMP recorded. Patient is postmenopausal.         Result Review :         Assessment and Plan        Chronic pain of left knee  Refer to ortho for eval/imaging and to discuss joint injection. Cont Celebrex prn.  Lower extremity edema  Lasix prn, Refer to vascular, reorder u/s bilat lower legs, adv to use compression stockings, elevate legs when sitting but adv to walk periodically to help blood flow.  Venous insufficiency  Refer to vascular, reorder u/s bilat lower legs  Hypercalcemia  Recheck labs, cont to avoid calcium supplement.  Hyperparathyroidism  Recheck labs, f/u with endo  Vitamin D deficiency  Check labs  Pure hypercholesterolemia   Chronic, stable, cont zocor 40  OAB (overactive bladder)  Chronic, controlled on Gemtessa, f/u with urology as dir  Chronic obstructive pulmonary disease, unspecified COPD type  Chronic, stable, cont Breo and prn albuterol  Osteoporosis without current pathological fracture, unspecified osteoporosis type  Refills on Fosamax    Orders Placed This Encounter   Procedures   • Comprehensive Metabolic Panel   • CBC Auto Differential   • Vitamin D,25-Hydroxy   • PTH, Intact   • Ambulatory Referral to Orthopedic Surgery   • Ambulatory Referral to Vascular Surgery     New Medications Ordered This Visit   Medications   • albuterol sulfate  (90 Base) MCG/ACT  inhaler     Sig: Inhale 2 puffs Every 4 (Four) Hours As Needed for Wheezing.     Dispense:  18 g     Refill:  1   • Breo Ellipta 100-25 MCG/ACT aerosol powder      Sig: Inhale 1 puff Daily.     Dispense:  60 each     Refill:  5   • celecoxib (CeleBREX) 200 MG capsule     Sig: Take 1 capsule by mouth Daily.     Dispense:  90 capsule     Refill:  1   • alendronate (Fosamax) 70 MG tablet     Sig: Take 1 tablet by mouth Every 7 (Seven) Days.     Dispense:  12 tablet     Refill:  1       Follow Up  Return in about 6 months (around 11/16/2025) for Yearly Physical.    Follow up if symptoms worsen or persist or has new or concerning symptoms, go to ER for severe symptoms.   Reviewed common medication effects and side effects and advised to report side effects immediately.   Encouraged medication compliance and the importance of keeping scheduled follow up appointments with me and any other providers.  If a referral was made please contact our office if you have not heard about an appointment in the next 2 weeks.   If labs or images are ordered we will contact you with the results within the next week.  If you have not heard from us after a week please call our office to inquire about the results.   Patient was given instructions and counseling regarding her condition and for health maintenance advice. Please see specific information pulled into the AVS if appropriate.   All questions were answered, the patient verbalized good understanding.     Whitley Treadwell PA-C    * Please note that portions of this note were completed with a voice recognition program.

## 2025-05-16 LAB
25(OH)D3 SERPL-MCNC: 32.1 NG/ML (ref 30–100)
PTH-INTACT SERPL-MCNC: 61.4 PG/ML (ref 15–65)

## 2025-05-21 ENCOUNTER — TELEPHONE (OUTPATIENT)
Dept: UROLOGY | Facility: CLINIC | Age: 80
End: 2025-05-21
Payer: MEDICARE

## 2025-05-21 NOTE — TELEPHONE ENCOUNTER
Called to let us know that the Gemtesa was too expensive for her. She talked with her PCP Whitley Treadwell and she suggested that the pt try oxybutynin. They want to know if you will call that in for her to trial.

## 2025-06-06 ENCOUNTER — PRIOR AUTHORIZATION (OUTPATIENT)
Dept: UROLOGY | Facility: CLINIC | Age: 80
End: 2025-06-06
Payer: MEDICARE

## 2025-06-06 NOTE — TELEPHONE ENCOUNTER
"PA started for Gemtesa. Per covermymeds- \"Available without authorization. The member is able to fill the requested drug at the pharmacy. If coverage is still needed or requesting prior to the expiration of a current authorization, a request can be made by sending a fax or calling the number on the back of the member's ID card\"    PA not necessary, pt can fill prescription at the pharmacy.   "

## 2025-07-08 ENCOUNTER — TELEPHONE (OUTPATIENT)
Dept: UROLOGY | Facility: CLINIC | Age: 80
End: 2025-07-08

## 2025-07-09 DIAGNOSIS — N32.81 OAB (OVERACTIVE BLADDER): Primary | ICD-10-CM

## 2025-07-16 ENCOUNTER — OFFICE VISIT (OUTPATIENT)
Dept: ENDOCRINOLOGY | Facility: CLINIC | Age: 80
End: 2025-07-16
Payer: MEDICARE

## 2025-07-16 VITALS
WEIGHT: 179 LBS | HEART RATE: 83 BPM | BODY MASS INDEX: 32.94 KG/M2 | DIASTOLIC BLOOD PRESSURE: 70 MMHG | SYSTOLIC BLOOD PRESSURE: 112 MMHG | HEIGHT: 62 IN

## 2025-07-16 DIAGNOSIS — M81.0 OSTEOPOROSIS WITHOUT CURRENT PATHOLOGICAL FRACTURE, UNSPECIFIED OSTEOPOROSIS TYPE: Primary | ICD-10-CM

## 2025-07-16 DIAGNOSIS — E55.9 VITAMIN D DEFICIENCY: ICD-10-CM

## 2025-07-16 DIAGNOSIS — R79.89 HIGH SERUM PARATHYROID HORMONE (PTH): ICD-10-CM

## 2025-07-16 PROCEDURE — 99214 OFFICE O/P EST MOD 30 MIN: CPT | Performed by: INTERNAL MEDICINE

## 2025-07-16 NOTE — PROGRESS NOTES
"Chief Complaint   Patient presents with    Osteoporosis        HPI   Jackie Cordova is a 80 y.o. female had concerns including Osteoporosis.      Patient continues on Fosamax 70 mg weekly for osteoporosis.  She is also taking over-the-counter calcium and vitamin D.  She had interval labs with her PCP which were reviewed.  She denies any interval falls.    The following portions of the patient's history were reviewed and updated as appropriate: allergies, current medications, and past social history.    Review of Systems   Musculoskeletal:  Positive for gait problem.      /70   Pulse 83   Ht 157.5 cm (62.01\")   Wt 81.2 kg (179 lb)   BMI 32.73 kg/m²      Physical Exam      Constitutional:  well developed; well nourished  no acute distress  appears stated age   ENT/Thyroid: not examined   Eyes: Conjunctiva: clear   Respiratory:  breathing is unlabored  clear to auscultation bilaterally   Cardiovascular:  regular rate and rhythm   Chest:  Not performed.   Abdomen: Not performed.   : Not performed.   Musculoskeletal: Not performed   Skin: not performed.   Neuro: mental status, speech normal   Psych: mood and affect are within normal limits       Labs/Imaging   Latest Reference Range & Units 05/15/25 11:30   Sodium 136 - 145 mmol/L 139   Potassium 3.5 - 5.2 mmol/L 5.0   Chloride 98 - 107 mmol/L 103   CO2 22.0 - 29.0 mmol/L 23.9   Anion Gap 5.0 - 15.0 mmol/L 12.1   BUN 8 - 23 mg/dL 23   Creatinine 0.57 - 1.00 mg/dL 0.88   BUN/Creatinine Ratio 7.0 - 25.0  26.1 (H)   eGFR >60.0 mL/min/1.73 66.9   Glucose 65 - 99 mg/dL 71   Calcium 8.6 - 10.5 mg/dL 10.3   Alkaline Phosphatase 39 - 117 U/L 91   Total Protein 6.0 - 8.5 g/dL 7.4   Albumin 3.5 - 5.2 g/dL 4.1   Globulin gm/dL 3.3   A/G Ratio g/dL 1.2   AST (SGOT) 1 - 32 U/L 37 (H)   ALT (SGPT) 1 - 33 U/L 27   Total Bilirubin 0.0 - 1.2 mg/dL 0.4   PTH Intact (Serial Monitor) 15.0 - 65.0 pg/mL 61.4   25 Hydroxy, Vitamin D 30.0 - 100.0 ng/ml 32.1   (H): Data is abnormally " high    Diagnoses and all orders for this visit:    1. Osteoporosis without current pathological fracture, unspecified osteoporosis type (Primary)  -     DEXA Bone Density Axial; Future  Most recent DEXA scan was completed in August 2023.  Prior records are not available.  Patient has osteopenia of the lumbar spine.  Patient has clinical osteoporosis given history of hip fracture.  She is currently taking Fosamax 70 mg weekly and denies adverse effect.  She will continue supplemental calcium and vitamin D.  Repeat DEXA scan is due in August and was ordered during visit today.  CMP reviewed from May 2025 with normal EGFR and calcium.    2. High serum parathyroid hormone (PTH)  Patient with prior PTH elevation that persisted despite correction of vitamin D deficiency. Patient with prior mild elevation of calcium which is subsequently normalized.  Repeat labs from May 2025 with high normal PTH.  Continue to monitor annually.    3. Vitamin D deficiency  Vitamin D level reviewed from May 2025, 32.1.  Patient will continue current supplementation.         Return in about 1 year (around 7/16/2026). The patient was instructed to contact the clinic with any interval questions or concerns.    Electronically signed by: Ivana Banerjee MD   Endocrinologist    Dictated Utilizing Dragon Dictation

## 2025-07-22 ENCOUNTER — TELEPHONE (OUTPATIENT)
Dept: ENDOCRINOLOGY | Facility: CLINIC | Age: 80
End: 2025-07-22
Payer: MEDICARE

## 2025-07-22 NOTE — TELEPHONE ENCOUNTER
Sister return call and stated the pt told her 3 yrs ago she had DXA. The pt told the sister she could not get on the table. She sits in recliner and sleeps in a reclining position. She is confined to the house and when out in public she is in a wheelchair.    Please advise.

## 2025-07-22 NOTE — TELEPHONE ENCOUNTER
PT'S SISTER EWA CALLED STATING THIS PT IS UNABLE TO GET ON TABLE FOR BONE DENSITY EXAM AND THEREFORE CANNOT HAVE ONE DONE. SHE REQUESTED A CALL BACK TO CONSULT.

## 2025-07-22 NOTE — TELEPHONE ENCOUNTER
Please contact patient.  A DEXA is the standard of care for evaluating bone density.  If she is not able to lie flat, they may not be able to evaluate the spine or hip.  We could consider measurement at the one third radius.  However, this was not measured previously and thus we would not have any values for comparison.  I can change order to instruct for measurement at the radius, if desired.

## 2025-07-22 NOTE — TELEPHONE ENCOUNTER
Please clarify.  What I was referencing (measurement of the one third radius) is still a DEXA scan, just measuring an alternative site.

## 2025-07-23 ENCOUNTER — OFFICE VISIT (OUTPATIENT)
Dept: UROLOGY | Facility: CLINIC | Age: 80
End: 2025-07-23
Payer: MEDICARE

## 2025-07-23 VITALS — HEART RATE: 70 BPM | DIASTOLIC BLOOD PRESSURE: 77 MMHG | OXYGEN SATURATION: 94 % | SYSTOLIC BLOOD PRESSURE: 114 MMHG

## 2025-07-23 DIAGNOSIS — N32.81 OAB (OVERACTIVE BLADDER): ICD-10-CM

## 2025-07-23 DIAGNOSIS — R31.29 MICROSCOPIC HEMATURIA: Primary | ICD-10-CM

## 2025-07-23 LAB
BACTERIA UR QL AUTO: ABNORMAL /HPF
BILIRUB BLD-MCNC: NEGATIVE MG/DL
BILIRUB UR QL STRIP: NEGATIVE
CLARITY UR: CLEAR
CLARITY, POC: CLEAR
COLOR UR: YELLOW
COLOR UR: YELLOW
EXPIRATION DATE: ABNORMAL
GLUCOSE UR STRIP-MCNC: NEGATIVE MG/DL
GLUCOSE UR STRIP-MCNC: NEGATIVE MG/DL
HGB UR QL STRIP.AUTO: ABNORMAL
HYALINE CASTS UR QL AUTO: ABNORMAL /LPF
KETONES UR QL STRIP: NEGATIVE
KETONES UR QL: NEGATIVE
LEUKOCYTE EST, POC: NEGATIVE
LEUKOCYTE ESTERASE UR QL STRIP.AUTO: ABNORMAL
Lab: ABNORMAL
NITRITE UR QL STRIP: NEGATIVE
NITRITE UR-MCNC: NEGATIVE MG/ML
PH UR STRIP.AUTO: 5.5 [PH] (ref 5–8)
PH UR: 6 [PH] (ref 5–8)
PROT UR QL STRIP: NEGATIVE
PROT UR STRIP-MCNC: NEGATIVE MG/DL
RBC # UR STRIP: ABNORMAL /HPF
RBC # UR STRIP: ABNORMAL /UL
REF LAB TEST METHOD: ABNORMAL
SP GR UR STRIP: 1.02 (ref 1–1.03)
SP GR UR: 1.02 (ref 1–1.03)
SQUAMOUS #/AREA URNS HPF: ABNORMAL /HPF
UROBILINOGEN UR QL STRIP: ABNORMAL
UROBILINOGEN UR QL: NORMAL
WBC # UR STRIP: ABNORMAL /HPF

## 2025-07-23 PROCEDURE — 81001 URINALYSIS AUTO W/SCOPE: CPT

## 2025-07-23 NOTE — PROGRESS NOTES
Microscopic Hematuria Female Office Visit      Patient Name: Jackie Cordova  : 1945   MRN: [id number]     Chief Complaint:  Microscopic Hematuria.     Chief Complaint   Patient presents with    Recurrent UTI    Overactive Bladder    Blood in Urine     Microscopic    .     Referring Provider: No ref. provider found    History of Present Illness: Ms. Cordova is a 80 y.o. female with history of microscopic gross hematuria lower urinary tract symptoms.    Patient last seen in our clinic on 2025.  At this time formal prescription of beta 3 agonist, Gemtesa sent for OAB related symptoms. Patient reports improvement with urinary incontinence, urinary frequency and urgency. Continues to wear pads daily instead of adult briefs while taking Gemtesa.    Patient continues vaginal estrogen cream with improvement in vaginal estrogen. Urinalysis today is negative for infection, positive for +1 blood.    Patient denies dysuria, gross hematuria or known nephrolithiasis.  Patient follows PCP for chronic constipation.  Daily fluid intake includes 25 ounces of water. Patient reports she has decreased coffee intake to aid in OAB related symptoms.      Subjective      Review of System: Review of Systems   Genitourinary:  Positive for frequency and urgency.        Nocturia 1-2   All other systems reviewed and are negative.     I have reviewed the ROS documented by my clinical staff, I have updated appropriately and I agree. MIGUELINA Tanner    Past Medical History:  Past Medical History:   Diagnosis Date    Arthritis     Asthma ??    Uses inhalers    Cataract     Corrected    COPD (chronic obstructive pulmonary disease)     Glaucoma     Tested twice a year at eye doctor    Hyperlipidemia     Osteoporosis     Urinary incontinence ?    Started after broken leg    Urinary tract infection     Vitamin D deficiency     Wrist fracture        Past Surgical History:  Past Surgical History:   Procedure Laterality Date     COLONOSCOPY      ENDOSCOPY      HIP SURGERY      LEG SURGERY Right     ORIF HIP FRACTURE Right     REPLACEMENT TOTAL KNEE         Medications:    Current Outpatient Medications:     albuterol sulfate  (90 Base) MCG/ACT inhaler, Inhale 2 puffs Every 4 (Four) Hours As Needed for Wheezing., Disp: 18 g, Rfl: 1    alendronate (Fosamax) 70 MG tablet, Take 1 tablet by mouth Every 7 (Seven) Days., Disp: 12 tablet, Rfl: 1    Breo Ellipta 100-25 MCG/ACT aerosol powder , Inhale 1 puff Daily., Disp: 60 each, Rfl: 5    Calcium Carbonate-Vit D-Min (Calcium 1200) 3276-2496 MG-UNIT chewable tablet, Chew 1 each Daily. Calcium and Vitamin D, Disp: , Rfl:     celecoxib (CeleBREX) 200 MG capsule, Take 1 capsule by mouth Daily., Disp: 90 capsule, Rfl: 1    Cranberry 500 MG capsule, Take 500 mg by mouth Daily., Disp: 28 each, Rfl: 0    estradiol (ESTRACE) 0.1 MG/GM vaginal cream, Insert 2 g of vaginal cream around vaginal tissue three times weekly. Monday-Wednesday- Friday, Disp: 2 g, Rfl: 6    furosemide (LASIX) 20 MG tablet, Take 1 tablet by mouth Daily As Needed (edema)., Disp: 30 tablet, Rfl: 0    nystatin (MYCOSTATIN) 905658 UNIT/GM cream, , Disp: , Rfl:     simvastatin (ZOCOR) 40 MG tablet, TAKE ONE TABLET BY MOUTH ONCE NIGHTLY, Disp: 90 tablet, Rfl: 3    Vibegron 75 MG tablet, Take 1 tablet by mouth Daily., Disp: 28 tablet, Rfl: 0    Vibegron 75 MG tablet, Take 1 tablet by mouth Daily., Disp: 30 tablet, Rfl: 2    Allergies:  No Known Allergies    Social History:  Social History     Socioeconomic History    Marital status:    Tobacco Use    Smoking status: Former     Current packs/day: 0.00     Average packs/day: 0.5 packs/day for 40.0 years (20.0 ttl pk-yrs)     Types: Cigarettes     Start date: 1981     Quit date:      Years since quittin.5     Passive exposure: Past    Smokeless tobacco: Never   Vaping Use    Vaping status: Never Used   Substance and Sexual Activity    Alcohol use: Not Currently    Drug  use: Never    Sexual activity: Not Currently     Partners: Male     Birth control/protection: Abstinence       Family History:  Family History   Problem Relation Age of Onset    Cancer Father     Arthritis Mother     Arthritis Sister     Arthritis Brother     Diabetes Brother     Arthritis Brother     Diabetes Brother     Arthritis Brother     Arthritis Brother     Breast cancer Neg Hx     Kidney cancer Neg Hx         bladder cancer     Bladder & Bowel Symptom Questionnaire    How often do you usually urinate during the day ?   3 - About every 1-2 hours   2.   How many timed do you urinate at night?   1 - 2 times at night   3.   What is the reason that you usually urinate?   2 - Moderate urge (can delay 10-60 min)   4.   Once you get the urge to go, how long can you     comfortably delay?   3 - Less than 10 min   5.   How often do you get a sudden urge that makes you rush to the bathroom?   4 - At least once a day   6.   How often does a sudden urge to urinate result in you leaking urine or wetting pads?   4 - At least once a day   7.  In your opinion, how good is your bladder control?   2 - Good   8.  Do you have accidental bowel leakage?   yes   9.  Do you have difficulty fully emptying your bladder?   no   10.  Do you experience accidental leakage when performing some physical activity such as coughing, sneezing, laughing or exercise?   yes   11. Have you tried medications to help improve your symptoms?   yes   12. Would you be interested in learning about a long-lasting option that may help you with your symptoms?   no                                                                             Total Score   19     0-7 (Mild) 8-16 (Moderate) 17-28 (Severe)        Post void residual bladder scan:   0 cc     Objective     Physical Exam:   Vital Signs:   Vitals:    07/23/25 1145   BP: 114/77   Pulse: 70   SpO2: 94%     There is no height or weight on file to calculate BMI.     Physical Exam  Vitals and nursing note  reviewed.   Constitutional:       Appearance: Normal appearance.   Pulmonary:      Effort: Pulmonary effort is normal.   Neurological:      Mental Status: She is alert and oriented to person, place, and time.   Psychiatric:         Mood and Affect: Mood normal.         Behavior: Behavior normal.         Labs:   Brief Urine Lab Results  (Last result in the past 365 days)        Color   Clarity   Blood   Leuk Est   Nitrite   Protein   CREAT   Urine HCG        07/23/25 1258 Yellow   Clear   1+   Negative   Negative   Negative                   Urine Culture          11/15/2024    11:10 12/30/2024    14:52 1/23/2025    15:29   Urine Culture   Urine Culture >100,000 CFU/mL Escherichia coli  >100,000 CFU/mL Escherichia coli  No growth         Lab Results   Component Value Date    GLUCOSE 71 05/15/2025    CALCIUM 10.3 05/15/2025     05/15/2025    K 5.0 05/15/2025    CO2 23.9 05/15/2025     05/15/2025    BUN 23 05/15/2025    CREATININE 0.88 05/15/2025    BCR 26.1 (H) 05/15/2025    ANIONGAP 12.1 05/15/2025       Lab Results   Component Value Date    WBC 7.30 05/15/2025    HGB 12.2 05/15/2025    HCT 36.6 05/15/2025    MCV 92.7 05/15/2025     05/15/2025       Images:   No Images in the past 120 days found..    Measures:   Tobacco:   Jackie Cordova  reports that she quit smoking about 4 years ago. Her smoking use included cigarettes. She started smoking about 44 years ago. She has a 20 pack-year smoking history. She has been exposed to tobacco smoke. She has never used smokeless tobacco.        Urine Incontinence: Patient reports that she is  currently experiencing  symptoms of urinary incontinence.       Assessment / Plan      Assessment/Plan:   Ms. Jackie Cordova is a 80 y.o. female who presents today for asymptomatic microscopic hematuria and lower urinary tract symptoms including urinary frequency, urgency with incontinence and nocturia. Patient continues beta 3 agonist, Gemtesa to aid in OAB related  symptoms.  Patient reports improvement while taking Gemtesa. Unfortunately, patient's co-pay is 200 monthly for Gemtesa. We discussed placing formal prescription through [facility] pharmacy with hopes to decrease monthly co-pay for patient.  We have provided patient with additional samples while we await medication from [email or web address] pharmacy. Urinalysis today is negative for infection. Urinalysis is positive for blood. We will send urinalysis for UA micro to further evaluate the presence of RBCs.  Patient is understanding and agreeable plan of care. She will alert questions or concerns in the interim.          Diagnoses and all orders for this visit:    1. Microscopic hematuria (Primary)  -     Urinalysis With Microscopic - Urine, Clean Catch; Future  -     Urinalysis With Microscopic - Urine, Clean Catch  -     POC Urinalysis Dipstick, Automated    2. OAB (overactive bladder)  -     Vibegron 75 MG tablet; Take 1 tablet by mouth Daily.  Dispense: 28 tablet; Refill: 0  -     Vibegron 75 MG tablet; Take 1 tablet by mouth Daily.  Dispense: 30 tablet; Refill: 2         Follow Up:   Return in about 3 months (around 10/23/2025) for Next scheduled follow up.    I spent approximately 30 minutes providing clinical care for this patient; including review of patient's chart and provider documentation, face to face time spent with patient in examination room (obtaining history, performing physical exam, discussing diagnosis and management options), placing orders, and completing patient documentation.     MIGUELINA Tanner  [facility] [place]

## 2025-07-31 ENCOUNTER — HOSPITAL ENCOUNTER (OUTPATIENT)
Dept: CARDIOLOGY | Facility: HOSPITAL | Age: 80
Discharge: HOME | End: 2025-07-31
Admitting: PHYSICIAN ASSISTANT
Payer: MEDICARE

## 2025-07-31 DIAGNOSIS — R60.0 LOWER EXTREMITY EDEMA: ICD-10-CM

## 2025-07-31 DIAGNOSIS — I87.2 VENOUS INSUFFICIENCY: ICD-10-CM

## 2025-07-31 LAB
BH CV LEFT LOWER VAS COMMON FEMORAL REFLUX TIME: 3.4 SEC
BH CV LEFT LOWER VAS GSV KNEE REFLUX TIME: 5.7 SEC
BH CV LEFT LOWER VAS GSV KNEE TRANSVERSE DIAMETER: 0.2 CM
BH CV LEFT LOWER VAS GSV MID TRANSVERSE DIAMETER: 0.5 CM
BH CV LEFT LOWER VAS GSV PROX REFLUX TIME: 6 SEC
BH CV LEFT LOWER VAS GSV PROX TRANSVERSE DIAMETER: 0.4 CM
BH CV LEFT LOWER VAS GSVBELOW KNEE TRANSVERSE DIAMETER: 0.1 CM
BH CV LEFT LOWER VAS SAPHENOFEMORAL JUNCTION REFLUX TIME: 6 SEC
BH CV LEFT LOWER VAS SAPHENOFEMORAL JUNCTION TRANSVERSE DIAMETER: 0.6 CM
BH CV LEFT LOWER VAS SSV MID CALF TRANS DIAMETER: 0.2 CM
BH CV LEFT LOWER VAS SSV PROX CALF TRANS DIAMETER: 0.3 CM
BH CV LOWER VAS LEFT GSV DIST THIGH COMPRESSIBILTY: NORMAL
BH CV LOWER VAS LEFT GSV MID THIGH COMPRESSIBILTY: NORMAL
BH CV LOWER VAS RIGHT GSV DIST THIGH COMPRESSIBILTY: NORMAL
BH CV LOWER VAS RIGHT GSV MID THIGH COMPRESSIBILTY: NORMAL
BH CV LOWER VASCULAR LEFT AA GSV COMPETENT: NORMAL
BH CV LOWER VASCULAR LEFT AA GSV COMPRESS: NORMAL
BH CV LOWER VASCULAR LEFT COMMON FEMORAL AUGMENT: NORMAL
BH CV LOWER VASCULAR LEFT COMMON FEMORAL COMPETENT: NORMAL
BH CV LOWER VASCULAR LEFT COMMON FEMORAL COMPRESS: NORMAL
BH CV LOWER VASCULAR LEFT COMMON FEMORAL PHASIC: NORMAL
BH CV LOWER VASCULAR LEFT COMMON FEMORAL SPONT: NORMAL
BH CV LOWER VASCULAR LEFT DISTAL FEMORAL COMPRESS: NORMAL
BH CV LOWER VASCULAR LEFT GREATER SAPH AK COMPETENT: NORMAL
BH CV LOWER VASCULAR LEFT GREATER SAPH AK COMPRESS: NORMAL
BH CV LOWER VASCULAR LEFT GREATER SAPH BK COMPETENT: NORMAL
BH CV LOWER VASCULAR LEFT GREATER SAPH BK COMPRESS: NORMAL
BH CV LOWER VASCULAR LEFT GSV DIST THIGH COMPETENT: NORMAL
BH CV LOWER VASCULAR LEFT GSV MID THIGH COMPETENT: NORMAL
BH CV LOWER VASCULAR LEFT LESSER SAPH COMPETENT: NORMAL
BH CV LOWER VASCULAR LEFT LESSER SAPH COMPRESS: NORMAL
BH CV LOWER VASCULAR LEFT MID FEMORAL AUGMENT: NORMAL
BH CV LOWER VASCULAR LEFT MID FEMORAL COMPETENT: NORMAL
BH CV LOWER VASCULAR LEFT MID FEMORAL COMPRESS: NORMAL
BH CV LOWER VASCULAR LEFT MID FEMORAL PHASIC: NORMAL
BH CV LOWER VASCULAR LEFT MID FEMORAL SPONT: NORMAL
BH CV LOWER VASCULAR LEFT POPLITEAL AUGMENT: NORMAL
BH CV LOWER VASCULAR LEFT POPLITEAL COMPETENT: NORMAL
BH CV LOWER VASCULAR LEFT POPLITEAL COMPRESS: NORMAL
BH CV LOWER VASCULAR LEFT POPLITEAL PHASIC: NORMAL
BH CV LOWER VASCULAR LEFT POPLITEAL SPONT: NORMAL
BH CV LOWER VASCULAR LEFT POSTERIOR TIBIAL COMPRESS: NORMAL
BH CV LOWER VASCULAR LEFT PROXIMAL FEMORAL COMPRESS: NORMAL
BH CV LOWER VASCULAR LEFT SAPHENOFEMORAL JUNCTION AUGMENT: NORMAL
BH CV LOWER VASCULAR LEFT SAPHENOFEMORAL JUNCTION COMPETENT: NORMAL
BH CV LOWER VASCULAR LEFT SAPHENOFEMORAL JUNCTION COMPRESS: NORMAL
BH CV LOWER VASCULAR LEFT SAPHENOFEMORAL JUNCTION PHASIC: NORMAL
BH CV LOWER VASCULAR LEFT SAPHENOFEMORAL JUNCTION SPONT: NORMAL
BH CV LOWER VASCULAR LEFT SSV MID CALF COMPETENT: NORMAL
BH CV LOWER VASCULAR LEFT SSV MID CALF COMPRESS: NORMAL
BH CV LOWER VASCULAR RIGHT COMMON FEMORAL AUGMENT: NORMAL
BH CV LOWER VASCULAR RIGHT COMMON FEMORAL COMPETENT: NORMAL
BH CV LOWER VASCULAR RIGHT COMMON FEMORAL COMPRESS: NORMAL
BH CV LOWER VASCULAR RIGHT COMMON FEMORAL PHASIC: NORMAL
BH CV LOWER VASCULAR RIGHT COMMON FEMORAL SPONT: NORMAL
BH CV LOWER VASCULAR RIGHT DISTAL FEMORAL COMPRESS: NORMAL
BH CV LOWER VASCULAR RIGHT GREATER SAPH AK COMPETENT: NORMAL
BH CV LOWER VASCULAR RIGHT GREATER SAPH BK COMPETENT: NORMAL
BH CV LOWER VASCULAR RIGHT GREATER SAPH BK COMPRESS: NORMAL
BH CV LOWER VASCULAR RIGHT GSV DIST THIGH COMPETENT: NORMAL
BH CV LOWER VASCULAR RIGHT GSV MID THIGH COMPETENT: NORMAL
BH CV LOWER VASCULAR RIGHT LESSER SAPH COMPETENT: NORMAL
BH CV LOWER VASCULAR RIGHT LESSER SAPH COMPRESS: NORMAL
BH CV LOWER VASCULAR RIGHT MID FEMORAL AUGMENT: NORMAL
BH CV LOWER VASCULAR RIGHT MID FEMORAL COMPETENT: NORMAL
BH CV LOWER VASCULAR RIGHT MID FEMORAL COMPRESS: NORMAL
BH CV LOWER VASCULAR RIGHT MID FEMORAL PHASIC: NORMAL
BH CV LOWER VASCULAR RIGHT MID FEMORAL SPONT: NORMAL
BH CV LOWER VASCULAR RIGHT POPLITEAL AUGMENT: NORMAL
BH CV LOWER VASCULAR RIGHT POPLITEAL COMPETENT: NORMAL
BH CV LOWER VASCULAR RIGHT POPLITEAL COMPRESS: NORMAL
BH CV LOWER VASCULAR RIGHT POPLITEAL PHASIC: NORMAL
BH CV LOWER VASCULAR RIGHT POPLITEAL SPONT: NORMAL
BH CV LOWER VASCULAR RIGHT POSTERIOR TIBIAL COMPRESS: NORMAL
BH CV LOWER VASCULAR RIGHT PROXIMAL FEMORAL COMPRESS: NORMAL
BH CV LOWER VASCULAR RIGHT SAPHENOFEMORAL JUNCTION AUGMENT: NORMAL
BH CV LOWER VASCULAR RIGHT SAPHENOFEMORAL JUNCTION COMPETENT: NORMAL
BH CV LOWER VASCULAR RIGHT SAPHENOFEMORAL JUNCTION COMPRESS: NORMAL
BH CV LOWER VASCULAR RIGHT SAPHENOFEMORAL JUNCTION PHASIC: NORMAL
BH CV LOWER VASCULAR RIGHT SAPHENOFEMORAL JUNCTION SPONT: NORMAL
BH CV LOWER VASCULAR RIGHT SSV MID CALF COMPETENT: NORMAL
BH CV LOWER VASCULAR RIGHT SSV MID CALF COMPRESS: NORMAL
BH CV RIGHT LOWER VAS COMMON FEMORAL REFLUX COLOR FLOW TIME: 5.8 SEC
BH CV RIGHT LOWER VAS GSV KNEE REFLUX TIME: 6.2 SEC
BH CV RIGHT LOWER VAS GSV KNEE TRANS DIAMETER: 0.5 CM
BH CV RIGHT LOWER VAS GSV MID THIGH TRANS DIAMETER: 0.4 CM
BH CV RIGHT LOWER VAS GSV PROX CALF REFLUX TIME: 6.3 SEC
BH CV RIGHT LOWER VAS GSV PROX CALF TRANS DIAMETER: 0.4 CM
BH CV RIGHT LOWER VAS GSV PROX THIGH REFLUX TIME: 6.1 SEC
BH CV RIGHT LOWER VAS GSV PROX THIGH TRANS DIAMETER: 0.5 CM
BH CV RIGHT LOWER VAS SAPHENOFEM JUNCTION REFLUX TIME: 6.5 SEC
BH CV RIGHT LOWER VAS SAPHENOFEM JUNCTION TRANSVERSE DIAMETER: 0.6 CM
BH CV RIGHT LOWER VAS SSV MID CALF TRANS DIAMETER: 0.2 CM
BH CV RIGHT LOWER VAS SSV PROX CALF TRANS DIAMETER: 0.1 CM
BH CV VAS RIGHT GSV PROXIMAL HIDDEN LRR COMPRESSIBILTY: NORMAL

## 2025-07-31 PROCEDURE — 93970 EXTREMITY STUDY: CPT

## 2025-08-18 DIAGNOSIS — N32.81 OAB (OVERACTIVE BLADDER): ICD-10-CM

## 2025-08-18 DIAGNOSIS — N32.81 OAB (OVERACTIVE BLADDER): Primary | ICD-10-CM
